# Patient Record
Sex: FEMALE | Race: WHITE | NOT HISPANIC OR LATINO | Employment: FULL TIME | URBAN - METROPOLITAN AREA
[De-identification: names, ages, dates, MRNs, and addresses within clinical notes are randomized per-mention and may not be internally consistent; named-entity substitution may affect disease eponyms.]

---

## 2017-01-16 ENCOUNTER — OFFICE VISIT (OUTPATIENT)
Dept: FAMILY MEDICINE | Facility: CLINIC | Age: 37
End: 2017-01-16
Payer: COMMERCIAL

## 2017-01-16 VITALS
HEIGHT: 69 IN | SYSTOLIC BLOOD PRESSURE: 123 MMHG | HEART RATE: 77 BPM | OXYGEN SATURATION: 100 % | TEMPERATURE: 97.6 F | DIASTOLIC BLOOD PRESSURE: 81 MMHG | BODY MASS INDEX: 38.66 KG/M2 | WEIGHT: 261 LBS

## 2017-01-16 DIAGNOSIS — Z23 NEED FOR PROPHYLACTIC VACCINATION AND INOCULATION AGAINST INFLUENZA: ICD-10-CM

## 2017-01-16 DIAGNOSIS — Z13.1 SCREENING FOR DIABETES MELLITUS: ICD-10-CM

## 2017-01-16 DIAGNOSIS — Z13.6 CARDIOVASCULAR SCREENING; LDL GOAL LESS THAN 160: ICD-10-CM

## 2017-01-16 DIAGNOSIS — F32.0 MAJOR DEPRESSIVE DISORDER, SINGLE EPISODE, MILD (H): ICD-10-CM

## 2017-01-16 DIAGNOSIS — Z00.00 ENCOUNTER FOR ROUTINE ADULT HEALTH EXAMINATION WITHOUT ABNORMAL FINDINGS: Primary | ICD-10-CM

## 2017-01-16 PROCEDURE — 99385 PREV VISIT NEW AGE 18-39: CPT | Mod: 25 | Performed by: PHYSICIAN ASSISTANT

## 2017-01-16 PROCEDURE — 90686 IIV4 VACC NO PRSV 0.5 ML IM: CPT | Performed by: PHYSICIAN ASSISTANT

## 2017-01-16 PROCEDURE — 90471 IMMUNIZATION ADMIN: CPT | Performed by: PHYSICIAN ASSISTANT

## 2017-01-16 RX ORDER — ACETAMINOPHEN 325 MG/1
325-650 TABLET ORAL
COMMUNITY
Start: 2013-12-18

## 2017-01-16 RX ORDER — CITALOPRAM HYDROBROMIDE 40 MG/1
40 TABLET ORAL DAILY
Qty: 90 TABLET | Refills: 1 | Status: SHIPPED | OUTPATIENT
Start: 2017-01-16 | End: 2017-07-26

## 2017-01-16 RX ORDER — IBUPROFEN 200 MG
600 TABLET ORAL
COMMUNITY
Start: 2013-12-18 | End: 2018-07-30

## 2017-01-16 RX ORDER — CITALOPRAM HYDROBROMIDE 40 MG/1
40 TABLET ORAL
COMMUNITY
Start: 2016-05-17 | End: 2017-01-16

## 2017-01-16 NOTE — MR AVS SNAPSHOT
After Visit Summary   1/16/2017    Shala Poole    MRN: [id number]           Patient Information     Date Of Birth          1980        Visit Information        Provider Department      1/16/2017 4:40 PM Livia Fall PA-C [facility]        Today's Diagnoses     Encounter for routine adult health examination without abnormal findings    -  1     Major depressive disorder, single episode, mild (H)         Need for prophylactic vaccination and inoculation against influenza         CARDIOVASCULAR SCREENING; LDL GOAL LESS THAN 160         Screening for diabetes mellitus           Care Instructions      Preventive Health Recommendations  Female Ages 26 - 39  Yearly exam:   See your health care provider every year in order to    Review health changes.     Discuss preventive care.      Review your medicines if you your doctor has prescribed any.    Until age 30: Get a Pap test every three years (more often if you have had an abnormal result).    After age 30: Talk to your doctor about whether you should have a Pap test every 3 years or have a Pap test with HPV screening every 5 years.   You do not need a Pap test if your uterus was removed (hysterectomy) and you have not had cancer.  You should be tested each year for STDs (sexually transmitted diseases), if you're at risk.   Talk to your provider about how often to have your cholesterol checked.  If you are at risk for diabetes, you should have a diabetes test (fasting glucose).  Shots: Get a flu shot each year. Get a tetanus shot every 10 years.   Nutrition:     Eat at least 5 servings of fruits and vegetables each day.    Eat whole-grain bread, whole-wheat pasta and brown rice instead of white grains and rice.    Talk to your provider about Calcium and Vitamin D.     Lifestyle    Exercise at least 150 minutes a week (30 minutes a day, 5 days of the week). This will help you control your weight and prevent disease.    Limit  "alcohol to one drink per day.    No smoking.     Wear sunscreen to prevent skin cancer.    See your dentist every six months for an exam and cleaning.            Follow-ups after your visit        Future tests that were ordered for you today     Open Future Orders        Priority Expected Expires Ordered    Lipid panel reflex to direct LDL Routine  2017    Glucose whole blood Routine  2017            Who to contact     If you have questions or need follow up information about today's clinic visit or your schedule please contact [facility] directly at [phone].  Normal or non-critical lab and imaging results will be communicated to you by [employer], letter or phone within 4 business days after the clinic has received the results. If you do not hear from us within 7 days, please contact the clinic through [employer] or phone. If you have a critical or abnormal lab result, we will notify you by phone as soon as possible.  Submit refill requests through [employer] or call your pharmacy and they will forward the refill request to us. Please allow 3 business days for your refill to be completed.          Additional Information About Your Visit        MyChart Information     [employer] lets you send messages to your doctor, view your test results, renew your prescriptions, schedule appointments and more. To sign up, go to [email or web address] . Click on \"Log in\" on the left side of the screen, which will take you to the Welcome page. Then click on \"Sign up Now\" on the right side of the page.     You will be asked to enter the access code listed below, as well as some personal information. Please follow the directions to create your username and password.     Your access code is: [email or web address]  Expires: 2017  4:54 PM     Your access code will  in 90 days. If you need help or a new code, please call your [facility] or [phone].        Care EveryWhere ID     This is " "your Care EveryWhere ID. This could be used by other organizations to access your [place] medical records  [id number]        Your Vitals Were     Pulse Temperature Height BMI (Body Mass Index) Pulse Oximetry       77 97.6  F (36.4  C) (Oral) 5' 8.5\" (1.74 m) 39.10 kg/m2 100%        Blood Pressure from Last 3 Encounters:   01/16/17 123/81    Weight from Last 3 Encounters:   01/16/17 261 lb (118.389 kg)              We Performed the Following     FLU VAC, SPLIT VIRUS IM > 3 YO (QUADRIVALENT) [72176]     Vaccine Administration, Initial [63395]          Today's Medication Changes          These changes are accurate as of: 1/16/17  4:54 PM.  If you have any questions, ask your nurse or doctor.               These medicines have changed or have updated prescriptions.        Dose/Directions    citalopram 40 MG tablet   Commonly known as:  celeXA   This may have changed:  when to take this   Used for:  Major depressive disorder, single episode, mild (H)   Changed by:  Livia Fall PA-C        Dose:  40 mg   Take 1 tablet (40 mg) by mouth daily   Quantity:  90 tablet   Refills:  1            Where to get your medicines      [id number] [address] [address] [facility]  [address] [facility] [address]    Hours:  24-hours Phone:  [phone]    - citalopram 40 MG tablet             Primary Care Provider    None Specified       No primary provider on file.        Thank you!     Thank you for choosing [facility]  for your [facility]. Our goal is always to provide you with excellent care. Hearing back from our patients is one way we can continue to improve our services. Please take a few minutes to complete the written survey that you may receive in the mail after your visit with us. Thank you!             Your Updated Medication List - Protect others around you: Learn how to safely use, store and throw " away your medicines at www.disposemymeds.org.          This list is accurate as of: 1/16/17  4:54 PM.  Always use your most recent med list.                   Brand Name Dispense Instructions for use    acetaminophen 325 MG tablet    TYLENOL     Take 325-650 mg by mouth       citalopram 40 MG tablet    celeXA    90 tablet    Take 1 tablet (40 mg) by mouth daily       ibuprofen 200 MG tablet    ADVIL/MOTRIN     Take 600 mg by mouth       omeprazole 20 MG CR capsule    priLOSEC     Take 20 mg by mouth

## 2017-01-16 NOTE — PROGRESS NOTES
SUBJECTIVE:     CC: Shala Poole is an 36 year old woman who presents for preventive health visit.     Healthy Habits:    Do you get at least three servings of calcium containing foods daily (dairy, green leafy vegetables, etc.)? yes    Amount of exercise or daily activities, outside of work: 3 hours per week.     Problems taking medications regularly No    Medication side effects: No    Have you had an eye exam in the past two years? yes    Do you see a dentist twice per year? yes    Do you have sleep apnea, excessive snoring or daytime drowsiness?no      She was diagnosed with Depression in 2006. Trigger was having children. She has been on the celexa for years. Denies any side effects from the medication. No past hospitalization for depression. Denies any thoughts of self harm or harming others.     She has chronic acid reflux, which is controlled well with the prilosec. She has not had an EGD for evaluation.       Today's PHQ-2 Score:   PHQ-2 ( 1999 Pfizer) 1/16/2017   Q1: Little interest or pleasure in doing things 0   Q2: Feeling down, depressed or hopeless 0   PHQ-2 Score 0       Abuse: Current or Past(Physical, Sexual or Emotional)- No  Do you feel safe in your environment - Yes    Social History   Substance Use Topics     Smoking status: Never Smoker      Smokeless tobacco: Not on file     Alcohol Use: No     The patient does not drink >3 drinks per day nor >7 drinks per week.    No results for input(s): CHOL, HDL, LDL, TRIG, CHOLHDLRATIO, NHDL in the last 45909 hours.    Reviewed orders with patient.  Reviewed health maintenance and updated orders accordingly - Yes    Mammo Decision Support:  Mammogram not appropriate for this patient based on age.    Pertinent mammograms are reviewed under the imaging tab.  History of abnormal Pap smear: Status post benign hysterectomy. Health Maintenance and Surgical History updated.  All Histories reviewed and updated in Epic.      ROS:  C: NEGATIVE for fever,  "chills, change in weight  I: NEGATIVE for worrisome rashes, moles or lesions  E: NEGATIVE for vision changes or irritation  ENT: NEGATIVE for ear, mouth and throat problems  R: NEGATIVE for significant cough or SOB  B: NEGATIVE for masses, tenderness or discharge  CV: NEGATIVE for chest pain, palpitations or peripheral edema  GI: NEGATIVE for nausea, abdominal pain, heartburn, or change in bowel habits   female: no unusual urinary symptoms, no unusual vaginal symptoms and no longer has menses since hysterectomy   M: NEGATIVE for significant arthralgias or myalgia  N: NEGATIVE for weakness, dizziness or paresthesias  P: NEGATIVE for changes in mood or affect    Problem list, Medication list, Allergies, and Medical/Social/Surgical histories reviewed in [facility] and updated as appropriate.  BP Readings from Last 3 Encounters:   01/16/17 123/81    Wt Readings from Last 3 Encounters:   01/16/17 261 lb (118.389 kg)                  There is no problem list on file for this patient.    Past Surgical History   Procedure Laterality Date     Hysterectomy, pap no longer indicated         Social History   Substance Use Topics     Smoking status: Never Smoker      Smokeless tobacco: Not on file     Alcohol Use: No     Family History   Problem Relation Age of Onset     Other - See Comments Mother      Fibromyalga.      DIABETES Father      Chronic Obstructive Pulmonary Disease Father      Asthma Father      Heart Failure Father      DIABETES Paternal Grandmother          Current Outpatient Prescriptions   Medication Sig Dispense Refill     omeprazole (PRILOSEC) 20 MG CR capsule Take 20 mg by mouth       ibuprofen (ADVIL/MOTRIN) 200 MG tablet Take 600 mg by mouth       citalopram (CELEXA) 40 MG tablet Take 40 mg by mouth       acetaminophen (TYLENOL) 325 MG tablet Take 325-650 mg by mouth       No Known Allergies  OBJECTIVE:     /81 mmHg  Pulse 77  Temp(Src) 97.6  F (36.4  C) (Oral)  Ht 5' 8.5\" (1.74 m)  Wt 261 lb (118.389 " "kg)  BMI 39.10 kg/m2  SpO2 100%  EXAM:  GENERAL: healthy, alert and no distress  EYES: Eyes grossly normal to inspection, PERRL and conjunctivae and sclerae normal  HENT: ear canals and TM's normal, nose and mouth without ulcers or lesions  NECK: no adenopathy, no asymmetry, masses, or scars and thyroid normal to palpation  RESP: lungs clear to auscultation - no rales, rhonchi or wheezes  BREAST: deferred  CV: regular rate and rhythm, normal S1 S2, no S3 or S4, no murmur, click or rub, no peripheral edema and peripheral pulses strong  ABDOMEN: soft, nontender, no hepatosplenomegaly, no masses and bowel sounds normal   (female): deferred  MS: no gross musculoskeletal defects noted, no edema  SKIN: no suspicious lesions or rashes  NEURO: Normal strength and tone, mentation intact and speech normal  PSYCH: mentation appears normal, affect normal/bright    ASSESSMENT/PLAN:         ICD-10-CM    1. Encounter for routine adult health examination without abnormal findings Z00.00    2. Major depressive disorder, single episode, mild (H) F32.0 citalopram (CELEXA) 40 MG tablet   3. Need for prophylactic vaccination and inoculation against influenza Z23 FLU VAC, SPLIT VIRUS IM > 3 YO (QUADRIVALENT) [14999]     Vaccine Administration, Initial [70925]   4. CARDIOVASCULAR SCREENING; LDL GOAL LESS THAN 160 Z13.6 Lipid panel reflex to direct LDL   5. Screening for diabetes mellitus Z13.1 Glucose whole blood       COUNSELING:   Reviewed preventive health counseling, as reflected in patient instructions       Regular exercise       Healthy diet/nutrition       Vaccinated for: Influenza         reports that she has never smoked. She does not have any smokeless tobacco history on file.    Estimated body mass index is 39.1 kg/(m^2) as calculated from the following:    Height as of this encounter: 5' 8.5\" (1.74 m).    Weight as of this encounter: 261 lb (118.389 kg).   Weight management plan: Discussed healthy diet and exercise " guidelines and patient will follow up in 12 months in clinic to re-evaluate.    Counseling Resources:  ATP IV Guidelines  Pooled Cohorts Equation Calculator  Breast Cancer Risk Calculator  FRAX Risk Assessment  ICSI Preventive Guidelines  Dietary Guidelines for Americans, 2010  USDA's MyPlate  ASA Prophylaxis  Lung CA Screening    Livia Fall PA-C  [facility]  Injectable Influenza Immunization Documentation    1.  Is the person to be vaccinated sick today?  No    2. Does the person to be vaccinated have an allergy to eggs or to a component of the vaccine?  No    3. Has the person to be vaccinated today ever had a serious reaction to influenza vaccine in the past?  No    4. Has the person to be vaccinated ever had Guillain-Pandora syndrome?  No     Form completed by Ivy Marshall MA

## 2017-01-16 NOTE — Clinical Note
Please abstract the following data from this visit with this patient into the appropriate field in Epic:  Lipids done on this date: 11/28/14 (approximately), by this group: [facility], results were Pulled from Care Everywhere.

## 2017-01-16 NOTE — Clinical Note
My Depression Action Plan  Name: Shala Poole   Date of Birth 1980  Date: 1/16/2017    My doctor: No primary care provider on file.   [address] [facility] [address] [phone]          GREEN    ZONE   Good Control    What it looks like:     Things are going generally well. You have normal up s and down s. You may even feel depressed from time to time, but bad moods usually last less than a day.   What you need to do:  1. Continue to care for yourself (see self care plan)  2. Check your depression survival kit and update it as needed  3. Follow your physician s recommendations including any medication.  4. Do not stop taking medication unless you consult with your physician first.           YELLOW         ZONE Getting Worse    What it looks like:     Depression is starting to interfere with your life.     It may be hard to get out of bed; you may be starting to isolate yourself from others.    Symptoms of depression are starting to last most all day and this has happened for several days.     You may have suicidal thoughts but they are not constant.   What you need to do:     1. Call your care team, your response to treatment will improve if you keep your care team informed of your progress. Yellow periods are signs an adjustment may need to be made.     2. Continue your self-care, even if you have to fake it!    3. Talk to someone in your support network    4. Open up your depression survival kit           RED    ZONE Medical Alert - Get Help    What it looks like:     Depression is seriously interfering with your life.     You may experience these or other symptoms: You can t get out of bed most days, can t work or engage in other necessary activities, you have trouble taking care of basic hygiene, or basic responsibilities, thoughts of suicide or death that will not go away, self-injurious behavior.     What you need to do:  1. Call your  care team and request a same-day appointment. If they are not available (weekends or after hours) call your local crisis line, emergency room or 911.      Electronically signed by: Ivy Marshall, January 16, 2017    Depression Self Care Plan / Survival Kit    Self-Care for Depression  Here s the deal. Your body and mind are really not as separate as most people think.  What you do and think affects how you feel and how you feel influences what you do and think. This means if you do things that people who feel good do, it will help you feel better.  Sometimes this is all it takes.  There is also a place for medication and therapy depending on how severe your depression is, so be sure to consult with your medical provider and/ or Behavioral Health Consultant if your symptoms are worsening or not improving.     In order to better manage my stress, I will:    Exercise  Get some form of exercise, every day. This will help reduce pain and release endorphins, the  feel good  chemicals in your brain. This is almost as good as taking antidepressants!  This is not the same as joining a gym and then never going! (they count on that by the way ) It can be as simple as just going for a walk or doing some gardening, anything that will get you moving.      Hygiene   Maintain good hygiene (Get out of bed in the morning, Make your bed, Brush your teeth, Take a shower, and Get dressed like you were going to work, even if you are unemployed).  If your clothes don't fit try to get ones that do.    Diet  I will strive to eat foods that are good for me, drink plenty of water, and avoid excessive sugar, caffeine, alcohol, and other mood-altering substances.  Some foods that are helpful in depression are: complex carbohydrates, B vitamins, flaxseed, fish or fish oil, fresh fruits and vegetables.    Psychotherapy  I agree to participate in Individual Therapy (if recommended).    Medication  If prescribed medications, I agree to take  them.  Missing doses can result in serious side effects.  I understand that drinking alcohol, or other illicit drug use, may cause potential side effects.  I will not stop my medication abruptly without first discussing it with my provider.    Staying Connected With Others  I will stay in touch with my friends, family members, and my primary care provider/team.    Use your imagination  Be creative.  We all have a creative side; it doesn t matter if it s oil painting, sand castles, or mud pies! This will also kick up the endorphins.    Witness Beauty  (AKA stop and smell the roses) Take a look outside, even in mid-winter. Notice colors, textures. Watch the squirrels and birds.     Service to others  Be of service to others.  There is always someone else in need.  By helping others we can  get out of ourselves  and remember the really important things.  This also provides opportunities for practicing all the other parts of the program.    Humor  Laugh and be silly!  Adjust your TV habits for less news and crime-drama and more comedy.    Control your stress  Try breathing deep, massage therapy, biofeedback, and meditation. Find time to relax each day.     My support system    Clinic Contact:  Phone number:    Contact 1:  Phone number:    Contact 2:  Phone number:    Jain/:  Phone number:    Therapist:  Phone number:    Local crisis center:    Phone number:    Other community support:  Phone number:

## 2017-01-16 NOTE — NURSING NOTE
"Chief Complaint   Patient presents with     Physical       Mask not indicated for this appointment.     Initial /81 mmHg  Pulse 77  Temp(Src) 97.6  F (36.4  C) (Oral)  Ht 5' 8.5\" (1.74 m)  Wt 261 lb (118.389 kg)  BMI 39.10 kg/m2  SpO2 100% Estimated body mass index is 39.1 kg/(m^2) as calculated from the following:    Height as of this encounter: 5' 8.5\" (1.74 m).    Weight as of this encounter: 261 lb (118.389 kg)..  BP completed using cuff size: jonny Marshall MA    "

## 2017-01-17 ASSESSMENT — PATIENT HEALTH QUESTIONNAIRE - PHQ9: SUM OF ALL RESPONSES TO PHQ QUESTIONS 1-9: 3

## 2017-04-24 DIAGNOSIS — Z13.1 SCREENING FOR DIABETES MELLITUS: ICD-10-CM

## 2017-04-24 DIAGNOSIS — Z13.6 CARDIOVASCULAR SCREENING; LDL GOAL LESS THAN 160: ICD-10-CM

## 2017-04-24 LAB
CHOLEST SERPL-MCNC: 152 MG/DL
GLUCOSE BLD-MCNC: 103 MG/DL (ref 70–99)
HDLC SERPL-MCNC: 41 MG/DL
LDLC SERPL CALC-MCNC: 65 MG/DL
NONHDLC SERPL-MCNC: 111 MG/DL
TRIGL SERPL-MCNC: 230 MG/DL

## 2017-04-24 PROCEDURE — 80061 LIPID PANEL: CPT | Performed by: PHYSICIAN ASSISTANT

## 2017-04-24 PROCEDURE — 82947 ASSAY GLUCOSE BLOOD QUANT: CPT | Performed by: PHYSICIAN ASSISTANT

## 2017-04-24 PROCEDURE — 36415 COLL VENOUS BLD VENIPUNCTURE: CPT | Performed by: PHYSICIAN ASSISTANT

## 2017-07-25 NOTE — PROGRESS NOTES
SUBJECTIVE:                                                    Shala Poole is a 37 year old female who presents to clinic today for the following health issues:        Depression Followup    Status since last visit: Stable     See PHQ-9 for current symptoms.  Other associated symptoms: None    Complicating factors:   Significant life event: Father passed away   Current substance abuse:  None  Anxiety or Panic symptoms:  No    PHQ-9  English  PHQ-9   Any Language      Amount of exercise or physical activity: None    Problems taking medications regularly: No    Medication side effects: none  Diet: regular (no restrictions)      She denies any side effects from her medication. States her depression is well controlled. She denies any thoughts of self harm or harming others. She denies any drug or alcohol use.     She is asking for a referral to audiologist to have her hearing aides checked. Hearing aides keep shutting off. She has tried to replace batteries with no improvement.     Problem list and histories reviewed & adjusted, as indicated.  Additional history: as documented    Patient Active Problem List   Diagnosis     Major depressive disorder, single episode, mild (H)     CARDIOVASCULAR SCREENING; LDL GOAL LESS THAN 160     Past Surgical History:   Procedure Laterality Date     HYSTERECTOMY, PAP NO LONGER INDICATED  12/2014    due to excessive bleeding - ovaries remain     ORTHOPEDIC SURGERY      cyst removal from left wrist        Social History   Substance Use Topics     Smoking status: Never Smoker     Smokeless tobacco: Not on file     Alcohol use No     Family History   Problem Relation Age of Onset     Other - See Comments Mother      Fibromyalga.      DIABETES Father      Chronic Obstructive Pulmonary Disease Father      Asthma Father      Heart Failure Father      DIABETES Paternal Grandmother          Current Outpatient Prescriptions   Medication Sig Dispense Refill     omeprazole (PRILOSEC) 20 MG  "CR capsule Take 20 mg by mouth       ibuprofen (ADVIL/MOTRIN) 200 MG tablet Take 600 mg by mouth       acetaminophen (TYLENOL) 325 MG tablet Take 325-650 mg by mouth       citalopram (CELEXA) 40 MG tablet Take 1 tablet (40 mg) by mouth daily 90 tablet 1     No Known Allergies  BP Readings from Last 3 Encounters:   07/26/17 112/70   01/16/17 123/81    Wt Readings from Last 3 Encounters:   07/26/17 269 lb (122 kg)   01/16/17 261 lb (118.4 kg)                        Reviewed and updated as needed this visit by clinical staff     Reviewed and updated as needed this visit by Provider         ROS:  Constitutional, and psychiatry systems are negative, except as otherwise noted.      OBJECTIVE:   /70  Temp 98  F (36.7  C) (Oral)  Ht 5' 8.5\" (1.74 m)  Wt 269 lb (122 kg)  SpO2 99%  BMI 40.31 kg/m2  Body mass index is 40.31 kg/(m^2).  GENERAL: healthy, alert and no distress  PSYCH: mentation appears normal, affect normal/bright        ASSESSMENT/PLAN:       ICD-10-CM    1. Major depressive disorder, single episode, mild (H) F32.0 citalopram (CELEXA) 40 MG tablet   2. Hearing aid consultation Z71.89 AUDIOLOGY ADULT REFERRAL         Patient Instructions   Recheck in 6 months at annual physical.    Return sooner if any concerns.       Livia Fall PA-C  [facility]  "

## 2017-07-26 ENCOUNTER — OFFICE VISIT (OUTPATIENT)
Dept: FAMILY MEDICINE | Facility: CLINIC | Age: 37
End: 2017-07-26
Payer: COMMERCIAL

## 2017-07-26 VITALS
SYSTOLIC BLOOD PRESSURE: 112 MMHG | HEIGHT: 69 IN | WEIGHT: 269 LBS | DIASTOLIC BLOOD PRESSURE: 70 MMHG | OXYGEN SATURATION: 99 % | TEMPERATURE: 98 F | BODY MASS INDEX: 39.84 KG/M2

## 2017-07-26 DIAGNOSIS — Z71.89 HEARING AID CONSULTATION: ICD-10-CM

## 2017-07-26 DIAGNOSIS — F32.0 MAJOR DEPRESSIVE DISORDER, SINGLE EPISODE, MILD (H): Primary | ICD-10-CM

## 2017-07-26 PROCEDURE — 99213 OFFICE O/P EST LOW 20 MIN: CPT | Performed by: PHYSICIAN ASSISTANT

## 2017-07-26 RX ORDER — CITALOPRAM HYDROBROMIDE 40 MG/1
40 TABLET ORAL DAILY
Qty: 90 TABLET | Refills: 1 | Status: SHIPPED | OUTPATIENT
Start: 2017-07-26 | End: 2017-09-21

## 2017-07-26 NOTE — MR AVS SNAPSHOT
After Visit Summary   7/26/2017    Shala Poole    MRN: [id number]           Patient Information     Date Of Birth          1980        Visit Information        Provider Department      7/26/2017 6:00 PM Livia Fall PA-C [facility]        Today's Diagnoses     Hearing aid consultation    -  1    Major depressive disorder, single episode, mild (H)          Care Instructions    Recheck in 6 months at annual physical.    Return sooner if any concerns.           Follow-ups after your visit        Additional Services     AUDIOLOGY ADULT REFERRAL       Your provider has referred you to: FMG: [facility] [phone]   [email or web address]  FMG: [place] [facility]i Howard [facility] [phone]   [email or web address]  FMG: [place] Maria Elena [facility] [phone]   [email or web address]  UM: [facility] [phone]   [email or web address]    Treatment:  Malfunctioning hearing aides   Specialty Testing:  None    Please be aware that coverage of these services is subject to the terms and limitations of your health insurance plan.  Call member services at your health plan with any benefit or coverage questions.      Please bring the following to your appointment:  >>   Any x-rays, CTs or MRIs which have been performed.  Contact the facility where they were done to arrange for  prior to your scheduled appointment.   >>   List of current medications  >>   This referral request   >>   Any documents/labs given to you for this referral                  Who to contact     If you have questions or need follow up information about today's clinic visit or your schedule please contact [facility] directly at [phone].  Normal or non-critical lab and imaging results will be  "communicated to you by Moviecom.tvhart, letter or phone within 4 business days after the clinic has received the results. If you do not hear from us within 7 days, please contact the clinic through PipelineDB or phone. If you have a critical or abnormal lab result, we will notify you by phone as soon as possible.  Submit refill requests through PipelineDB or call your pharmacy and they will forward the refill request to us. Please allow 3 business days for your refill to be completed.          Additional Information About Your Visit        PipelineDB Information     PipelineDB gives you secure access to your electronic health record. If you see a primary care provider, you can also send messages to your care team and make appointments. If you have questions, please call your primary care clinic.  If you do not have a primary care provider, please call [phone] and they will assist you.        Care EveryWhere ID     This is your Care EveryWhere ID. This could be used by other organizations to access your [place] medical records  [id number]        Your Vitals Were     Temperature Height Pulse Oximetry BMI (Body Mass Index)          98  F (36.7  C) (Oral) 5' 8.5\" (1.74 m) 99% 40.31 kg/m2         Blood Pressure from Last 3 Encounters:   07/26/17 112/70   01/16/17 123/81    Weight from Last 3 Encounters:   07/26/17 269 lb (122 kg)   01/16/17 261 lb (118.4 kg)              We Performed the Following     AUDIOLOGY ADULT REFERRAL          Where to get your medicines      These medications were sent to [facility] [address] [facility]  [address] [facility] [address]    Hours:  24-hours Phone:  [phone]     citalopram 40 MG tablet          Primary Care Provider Office Phone # Fax #    Livia Fall PA-C [phone]       [facility] [place] [address] [facility] [address]        Equal Access to Services     [facility] " GAAR : Hadii aad rj rachel Mir, wafrancoda luqadaha, qaybta kakourtney caceres, waxneha maribell haytequila coloradofredtony alegre. So [facility] [phone].    ATENCIÓN: Si habla español, tiene a chadwick disposición servicios gratuitos de asistencia lingüística. Llame al [phone].    We comply with applicable federal civil rights laws and [place] laws. We do not discriminate on the basis of race, color, national origin, age, disability sex, sexual orientation or gender identity.            Thank you!     Thank you for choosing [facility]  [facility]. Our goal is always to provide you with excellent care. Hearing back from our patients is one way we can continue to improve our services. Please take a few minutes to complete the written survey that you may receive in the mail after your visit with us. Thank you!             Your Updated Medication List - Protect others around you: Learn how to safely use, store and throw away your medicines at www.disposemymeds.org.          This list is accurate as of: 7/26/17  6:11 PM.  Always use your most recent med list.                   Brand Name Dispense Instructions for use Diagnosis    acetaminophen 325 MG tablet    TYLENOL     Take 325-650 mg by mouth        citalopram 40 MG tablet    celeXA    90 tablet    Take 1 tablet (40 mg) by mouth daily    Major depressive disorder, single episode, mild (H)       ibuprofen 200 MG tablet    ADVIL/MOTRIN     Take 600 mg by mouth        omeprazole 20 MG CR capsule    priLOSEC     Take 20 mg by mouth

## 2017-07-26 NOTE — NURSING NOTE
"Chief Complaint   Patient presents with     Depression     do phq        Initial /70  Temp 98  F (36.7  C) (Oral)  Ht 5' 8.5\" (1.74 m)  Wt 269 lb (122 kg)  SpO2 99%  BMI 40.31 kg/m2 Estimated body mass index is 40.31 kg/(m^2) as calculated from the following:    Height as of this encounter: 5' 8.5\" (1.74 m).    Weight as of this encounter: 269 lb (122 kg).  Medication Reconciliation: complete  Brandie Reyes M.A.    "

## 2017-07-27 ASSESSMENT — PATIENT HEALTH QUESTIONNAIRE - PHQ9: SUM OF ALL RESPONSES TO PHQ QUESTIONS 1-9: 1

## 2017-09-21 DIAGNOSIS — F32.0 MAJOR DEPRESSIVE DISORDER, SINGLE EPISODE, MILD (H): ICD-10-CM

## 2017-09-21 RX ORDER — CITALOPRAM HYDROBROMIDE 40 MG/1
40 TABLET ORAL DAILY
Qty: 90 TABLET | Refills: 0 | Status: SHIPPED | OUTPATIENT
Start: 2017-09-21 | End: 2017-12-27

## 2017-09-28 ENCOUNTER — OFFICE VISIT (OUTPATIENT)
Dept: AUDIOLOGY | Facility: CLINIC | Age: 37
End: 2017-09-28
Payer: COMMERCIAL

## 2017-09-28 DIAGNOSIS — H90.3 SENSORINEURAL HEARING LOSS, BILATERAL: Primary | ICD-10-CM

## 2017-09-28 PROCEDURE — V5299 HEARING SERVICE: HCPCS | Performed by: AUDIOLOGIST

## 2017-09-28 PROCEDURE — 99207 ZZC NO CHARGE LOS: CPT | Performed by: AUDIOLOGIST

## 2017-09-28 NOTE — PROGRESS NOTES
HEARING AID CHECK    Patient Name:  Shala Poole    Patient Age:   37 year old    :  1980    Background:   Sahla is a transfer from [facility], as she has switched insurances and [place] is now in network. Patient was fit with [facility] [employer] 5 RITEs at [facility] in 2016. She reports that her right ear hearing aid is intermittent, the  is too short, and the  is falling out of the earmold. 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     Procedures:   Today we send the right ear hearing aid to [employer] for a repair under warranty, a lengthening of the , and for better  retention in the earmold.     Plan:   Once the hearing aid is back from repair patient will be contacted for  at the 2nd floor . At that time she will leave her left ear hearing aid to be sent to the  to lengthen the  and to have it checked over as well. I have recommended she make an appointment for a hearing test as we do not have one on file for her.      NO CHARGE VISIT    [place] CCC-A  Licensed Audiologist  2017

## 2017-09-28 NOTE — MR AVS SNAPSHOT
After Visit Summary   9/28/2017    Shala Poole    MRN: [id number]           Patient Information     Date Of Birth          1980        Visit Information        Provider Department      9/28/2017 9:00 AM Rogers [place] AuD [facility]        Today's Diagnoses     Sensorineural hearing loss, bilateral    -  1       Follow-ups after your visit        Your next 10 appointments already scheduled     Nov 27, 2017  8:00 AM CST   Hearing Aid Consult with Henry Grimes   [facility] ([facility]    [address] [place] [address] [phone]              Who to contact     If you have questions or need follow up information about today's clinic visit or your schedule please contact [facility] directly at [phone].  Normal or non-critical lab and imaging results will be communicated to you by MyChart, letter or phone within 4 business days after the clinic has received the results. If you do not hear from us within 7 days, please contact the clinic through MyChart or phone. If you have a critical or abnormal lab result, we will notify you by phone as soon as possible.  Submit refill requests through Marketcetera or call your pharmacy and they will forward the refill request to us. Please allow 3 business days for your refill to be completed.          Additional Information About Your Visit        MyChart Information     Marketcetera gives you secure access to your electronic health record. If you see a primary care provider, you can also send messages to your care team and make appointments. If you have questions, please call your primary care clinic.  If you do not have a primary care provider, please call [phone] and they will assist you.        Care EveryWhere ID     This is your Care EveryWhere ID. This could be used by other organizations to access your [place] medical records  [id number]         Blood  Pressure from Last 3 Encounters:   07/26/17 112/70   01/16/17 123/81    Weight from Last 3 Encounters:   07/26/17 269 lb (122 kg)   01/16/17 261 lb (118.4 kg)              We Performed the Following     HEARING AID CHECK/NO CHARGE        Primary Care Provider Office Phone # Fax #    Livia Fall PA-C [phone] [address] [facility] [address]        Equal Access to Services     ADAM GOLDMAN : Hadii aad ku hadasho Soomaali, waaxda luqadaha, qaybta kaalmada adeegyada, waxay idiin hayaan adeeg kharash la'tequila . So [facility] [phone].    ATENCIÓN: Si habla español, tiene a chadwick disposición servicios gratuitos de asistencia lingüística. [facility] [phone].    We comply with applicable federal civil rights laws and [place] laws. We do not discriminate on the basis of race, color, national origin, age, disability sex, sexual orientation or gender identity.            Thank you!     Thank you for choosing [facility]  for your care. Our goal is always to provide you with excellent care. Hearing back from our patients is one way we can continue to improve our services. Please take a few minutes to complete the written survey that you may receive in the mail after your visit with us. Thank you!             Your Updated Medication List - Protect others around you: Learn how to safely use, store and throw away your medicines at www.disposemymeds.org.          This list is accurate as of: 9/28/17 10:12 AM.  Always use your most recent med list.                   Brand Name Dispense Instructions for use Diagnosis    acetaminophen 325 MG tablet    TYLENOL     Take 325-650 mg by mouth        citalopram 40 MG tablet    celeXA    90 tablet    Take 1 tablet (40 mg) by mouth daily    Major depressive disorder, single episode, mild (H)       ibuprofen 200 MG tablet    ADVIL/MOTRIN     Take 600 mg by mouth        omeprazole 20 MG CR capsule    priLOSEC     Take 20 mg by mouth

## 2017-10-17 ENCOUNTER — TELEPHONE (OUTPATIENT)
Dept: AUDIOLOGY | Facility: CLINIC | Age: 37
End: 2017-10-17

## 2017-10-17 NOTE — TELEPHONE ENCOUNTER
I informed the patient that her hearing aid was back from repair and ready to be picked up at the 2nd floor .     -[place] CCC-A

## 2017-12-27 DIAGNOSIS — F32.0 MAJOR DEPRESSIVE DISORDER, SINGLE EPISODE, MILD (H): ICD-10-CM

## 2017-12-29 RX ORDER — CITALOPRAM HYDROBROMIDE 40 MG/1
TABLET ORAL
Qty: 90 TABLET | Refills: 0 | Status: SHIPPED | OUTPATIENT
Start: 2017-12-29 | End: 2018-02-05

## 2017-12-29 NOTE — TELEPHONE ENCOUNTER
Rx refilled per Piedad RN refill protocol.    Pending appointment with PCP in Jan 2018.    Jessica Zaidi RN

## 2018-02-05 ENCOUNTER — OFFICE VISIT (OUTPATIENT)
Dept: FAMILY MEDICINE | Facility: CLINIC | Age: 38
End: 2018-02-05
Payer: COMMERCIAL

## 2018-02-05 VITALS
HEART RATE: 90 BPM | SYSTOLIC BLOOD PRESSURE: 122 MMHG | OXYGEN SATURATION: 98 % | TEMPERATURE: 99.1 F | BODY MASS INDEX: 38.8 KG/M2 | HEIGHT: 69 IN | DIASTOLIC BLOOD PRESSURE: 83 MMHG | WEIGHT: 262 LBS

## 2018-02-05 DIAGNOSIS — F32.0 MAJOR DEPRESSIVE DISORDER, SINGLE EPISODE, MILD (H): Primary | ICD-10-CM

## 2018-02-05 DIAGNOSIS — F41.1 GAD (GENERALIZED ANXIETY DISORDER): ICD-10-CM

## 2018-02-05 PROCEDURE — 99213 OFFICE O/P EST LOW 20 MIN: CPT | Performed by: PHYSICIAN ASSISTANT

## 2018-02-05 RX ORDER — VENLAFAXINE HYDROCHLORIDE 37.5 MG/1
CAPSULE, EXTENDED RELEASE ORAL
Qty: 60 CAPSULE | Refills: 1 | Status: SHIPPED | OUTPATIENT
Start: 2018-02-05 | End: 2018-03-19 | Stop reason: DRUGHIGH

## 2018-02-05 ASSESSMENT — ANXIETY QUESTIONNAIRES
GAD7 TOTAL SCORE: 7
IF YOU CHECKED OFF ANY PROBLEMS ON THIS QUESTIONNAIRE, HOW DIFFICULT HAVE THESE PROBLEMS MADE IT FOR YOU TO DO YOUR WORK, TAKE CARE OF THINGS AT HOME, OR GET ALONG WITH OTHER PEOPLE: SOMEWHAT DIFFICULT
3. WORRYING TOO MUCH ABOUT DIFFERENT THINGS: SEVERAL DAYS
7. FEELING AFRAID AS IF SOMETHING AWFUL MIGHT HAPPEN: NOT AT ALL
2. NOT BEING ABLE TO STOP OR CONTROL WORRYING: MORE THAN HALF THE DAYS
5. BEING SO RESTLESS THAT IT IS HARD TO SIT STILL: NOT AT ALL
1. FEELING NERVOUS, ANXIOUS, OR ON EDGE: SEVERAL DAYS
6. BECOMING EASILY ANNOYED OR IRRITABLE: SEVERAL DAYS

## 2018-02-05 ASSESSMENT — PATIENT HEALTH QUESTIONNAIRE - PHQ9: 5. POOR APPETITE OR OVEREATING: MORE THAN HALF THE DAYS

## 2018-02-05 NOTE — PROGRESS NOTES
SUBJECTIVE:   Shala Poole is a 38 year old female who presents to clinic today for the following health issues:    She has been a patient of Livia Little and has been taking citalopram 40 mg daily for her anxiety / depression. She does not feel that it has been very helpful for the anxiety. She has been on other SSRI medications in the past.       History of Present Illness     Depression & Anxiety Follow-up:     Depression/Anxiety:  Depression & Anxiety    Status since last visit::  Stable    Other associated symptoms of depression and anxiety::  None    Significant life event::  No    Current substance use::  None       Today's PHQ-9         PHQ-9 Total Score:         PHQ-9 Q9 Suicidal ideation:       Thoughts of suicide or self harm:      Self-harm Plan:        Self-harm Action:          Safety concerns for self or others:              Problem list and histories reviewed & adjusted, as indicated.  Additional history: as documented      Patient Active Problem List   Diagnosis     Major depressive disorder, single episode, mild (H)     CARDIOVASCULAR SCREENING; LDL GOAL LESS THAN 160     Past Surgical History:   Procedure Laterality Date     HYSTERECTOMY, PAP NO LONGER INDICATED  12/2014    due to excessive bleeding - ovaries remain     ORTHOPEDIC SURGERY      cyst removal from left wrist        Social History   Substance Use Topics     Smoking status: Never Smoker     Smokeless tobacco: Never Used     Alcohol use Yes      Comment: rare     Family History   Problem Relation Age of Onset     Other - See Comments Mother      Fibromyalga.      Thyroid Disease Mother      DIABETES Father      Chronic Obstructive Pulmonary Disease Father      Asthma Father      Heart Failure Father      DIABETES Paternal Grandmother      Colon Cancer Other          No Known Allergies    ROS:  Constitutional, HEENT, cardiovascular, pulmonary, gi and gu systems are negative, except as otherwise noted.    OBJECTIVE:     /83  " Pulse 90  Temp 99.1  F (37.3  C) (Oral)  Ht 5' 8.5\" (1.74 m)  Wt 262 lb (118.8 kg)  SpO2 98%  BMI 39.26 kg/m2  Body mass index is 39.26 kg/(m^2).  GENERAL: healthy, alert and no distress  PSYCH: mentation appears normal, affect normal/bright, judgement and insight intact and appearance well groomed    Diagnostic Test Results:  none     ASSESSMENT/PLAN:       1. Major depressive disorder, single episode, mild (H)  2. DAVID (generalized anxiety disorder)  Wean off of the citalopram over the next few weeks   Start the effexor and titrate dose.   Side effects and how to take the medication discussed.  She will follow up in 2 -3 months  - venlafaxine (EFFEXOR-XR) 37.5 MG 24 hr capsule; Take 1 capsule daily for 7 days, then take 2 capsules daily.  Dispense: 60 capsule; Refill: 1     20 minutes spent with Shala today. Over 50% of time taken for discussion of above, counseling and coordination of care.   Kristen M. Kehr, PA-C  [facility]  "

## 2018-02-05 NOTE — NURSING NOTE
"Chief Complaint   Patient presents with     Depression     Anxiety       Initial /83  Pulse 90  Temp 99.1  F (37.3  C) (Oral)  Ht 5' 8.5\" (1.74 m)  Wt 262 lb (118.8 kg)  SpO2 98%  BMI 39.26 kg/m2 Estimated body mass index is 39.26 kg/(m^2) as calculated from the following:    Height as of this encounter: 5' 8.5\" (1.74 m).    Weight as of this encounter: 262 lb (118.8 kg).  Medication Reconciliation: complete    KENNETH Chapman MA    "

## 2018-02-05 NOTE — MR AVS SNAPSHOT
After Visit Summary   2/5/2018    Shala Poole    MRN: [id number]           Patient Information     Date Of Birth          1980        Visit Information        Provider Department      2/5/2018 5:20 PM Kehr, Kristen M, PA-C [facility]        Today's Diagnoses     Major depressive disorder, single episode, mild (H)    -  1    DAVID (generalized anxiety disorder)          Care Instructions    Appointment 6 weeks           Follow-ups after your visit        Your next 10 appointments already scheduled     Apr 27, 2018  8:00 AM CDT   [facility] Hearing Test with Henry Grimes   [facility]    [address] [place] [address] [phone]              Who to contact     If you have questions or need follow up information about today's clinic visit or your schedule please contact [facility] directly at [phone].  Normal or non-critical lab and imaging results will be communicated to you by Lineahart, letter or phone within 4 business days after the clinic has received the results. If you do not hear from us within 7 days, please contact the clinic through Getaroundt or phone. If you have a critical or abnormal lab result, we will notify you by phone as soon as possible.  Submit refill requests through Wilocity or call your pharmacy and they will forward the refill request to us. Please allow 3 business days for your refill to be completed.          Additional Information About Your Visit        Lineahart Information     [employer] gives you secure access to your electronic health record. If you see a primary care provider, you can also send messages to your care team and make appointments. If you have questions, please call your primary care clinic.  If you do not have a primary care provider, please call [phone] and they will assist you.        Care EveryWhere ID     This is your Care EveryWhere ID.  "This could be used by other organizations to access your [place] medical records  [id number]        Your Vitals Were     Pulse Temperature Height Pulse Oximetry BMI (Body Mass Index)       90 99.1  F (37.3  C) (Oral) 5' 8.5\" (1.74 m) 98% 39.26 kg/m2        Blood Pressure from Last 3 Encounters:   02/05/18 122/83   07/26/17 112/70   01/16/17 123/81    Weight from Last 3 Encounters:   02/05/18 262 lb (118.8 kg)   07/26/17 269 lb (122 kg)   01/16/17 261 lb (118.4 kg)              Today, you had the following     No orders found for display         Today's Medication Changes          These changes are accurate as of 2/5/18  5:53 PM.  If you have any questions, ask your nurse or doctor.               Start taking these medicines.        Dose/Directions    venlafaxine 37.5 MG 24 hr capsule   Commonly known as:  EFFEXOR-XR   Used for:  Major depressive disorder, single episode, mild (H), DAVID (generalized anxiety disorder)   Started by:  Kehr, Kristen M, PA-C        Take 1 capsule daily for 7 days, then take 2 capsules daily.   Quantity:  60 capsule   Refills:  1         Stop taking these medicines if you haven't already. Please contact your care team if you have questions.     citalopram 40 MG tablet   Commonly known as:  celeXA   Stopped by:  Kehr, Kristen M, PA-C                Where to get your medicines      [facility] [address] [address] [facility]  [address] [facility] [address]    Hours:  24-hours Phone:  [phone]     venlafaxine 37.5 MG 24 hr capsule                Primary Care Provider Office Phone # Fax #    Livia Fall PA-C [phone] [address] [facility] [address]        Equal Access to Services     ADAM GOLDMAN AH: Josesito Mir, maria eugenia benton, jb caceres, waxay maribell alegre. So [facility] " [phone].    ATENCIÓN: Si ritika mishra, tiene a chadwick disposición servicios gratuitos de asistencia lingüística. Nnamdi javed [phone].    We comply with applicable federal civil rights laws and [place] laws. We do not discriminate on the basis of race, color, national origin, age, disability, sex, sexual orientation, or gender identity.            Thank you!     [facility]  [facility] your [facility]. Our goal is always to provide you with excellent care. Hearing back from our patients is one way we can continue to improve our services. Please take a few minutes to complete the written survey that you may receive in the mail after your visit with us. Thank you!             Your Updated Medication List - Protect others around you: Learn how to safely use, store and throw away your medicines at www.disposemymeds.org.          This list is accurate as of 2/5/18  5:53 PM.  Always use your most recent med list.                   Brand Name Dispense Instructions for use Diagnosis    acetaminophen 325 MG tablet    TYLENOL     Take 325-650 mg by mouth        ibuprofen 200 MG tablet    ADVIL/MOTRIN     Take 600 mg by mouth        venlafaxine 37.5 MG 24 hr capsule    EFFEXOR-XR    60 capsule    Take 1 capsule daily for 7 days, then take 2 capsules daily.    Major depressive disorder, single episode, mild (H), DAVID (generalized anxiety disorder)

## 2018-02-06 ASSESSMENT — ANXIETY QUESTIONNAIRES: GAD7 TOTAL SCORE: 7

## 2018-02-06 ASSESSMENT — PATIENT HEALTH QUESTIONNAIRE - PHQ9: SUM OF ALL RESPONSES TO PHQ QUESTIONS 1-9: 3

## 2018-03-19 ENCOUNTER — OFFICE VISIT (OUTPATIENT)
Dept: FAMILY MEDICINE | Facility: CLINIC | Age: 38
End: 2018-03-19
Payer: COMMERCIAL

## 2018-03-19 VITALS
OXYGEN SATURATION: 99 % | HEART RATE: 107 BPM | TEMPERATURE: 99.3 F | SYSTOLIC BLOOD PRESSURE: 128 MMHG | BODY MASS INDEX: 39.41 KG/M2 | WEIGHT: 263 LBS | RESPIRATION RATE: 16 BRPM | DIASTOLIC BLOOD PRESSURE: 85 MMHG

## 2018-03-19 DIAGNOSIS — F32.0 MAJOR DEPRESSIVE DISORDER, SINGLE EPISODE, MILD (H): Primary | ICD-10-CM

## 2018-03-19 PROCEDURE — 99213 OFFICE O/P EST LOW 20 MIN: CPT | Performed by: PHYSICIAN ASSISTANT

## 2018-03-19 RX ORDER — VENLAFAXINE HYDROCHLORIDE 75 MG/1
75 CAPSULE, EXTENDED RELEASE ORAL DAILY
Qty: 90 CAPSULE | Refills: 1 | Status: SHIPPED | OUTPATIENT
Start: 2018-03-19 | End: 2018-04-04

## 2018-03-19 ASSESSMENT — ANXIETY QUESTIONNAIRES
5. BEING SO RESTLESS THAT IT IS HARD TO SIT STILL: NOT AT ALL
1. FEELING NERVOUS, ANXIOUS, OR ON EDGE: NOT AT ALL
3. WORRYING TOO MUCH ABOUT DIFFERENT THINGS: NOT AT ALL
GAD7 TOTAL SCORE: 0
7. FEELING AFRAID AS IF SOMETHING AWFUL MIGHT HAPPEN: NOT AT ALL
6. BECOMING EASILY ANNOYED OR IRRITABLE: NOT AT ALL
2. NOT BEING ABLE TO STOP OR CONTROL WORRYING: NOT AT ALL

## 2018-03-19 ASSESSMENT — PATIENT HEALTH QUESTIONNAIRE - PHQ9: 5. POOR APPETITE OR OVEREATING: NOT AT ALL

## 2018-03-19 ASSESSMENT — PAIN SCALES - GENERAL: PAINLEVEL: NO PAIN (0)

## 2018-03-19 NOTE — MR AVS SNAPSHOT
After Visit Summary   3/19/2018    Shala Poole    MRN: [id number]           Patient Information     Date Of Birth          1980        Visit Information        Provider Department      3/19/2018 6:00 PM Kehr, Kristen M, PA-C [facility]        Today's Diagnoses     Major depressive disorder, single episode, mild (H)    -  1       Follow-ups after your visit        Your next 10 appointments already scheduled     Apr 27, 2018  8:00 AM CDT   [facility] Hearing Test with Henry Grimes   [facility]    [address] [place] [address] [phone]              Who to contact     If you have questions or need follow up information about today's clinic visit or your schedule please contact [facility] directly at [phone].  Normal or non-critical lab and imaging results will be communicated to you by [employer], letter or phone within 4 business days after the clinic has received the results. If you do not hear from us within 7 days, please contact the clinic through [employer] or phone. If you have a critical or abnormal lab result, we will notify you by phone as soon as possible.  Submit refill requests through [employer] or call your pharmacy and they will forward the refill request to us. Please allow 3 business days for your refill to be completed.          Additional Information About Your Visit        MyChart Information     [employer] gives you secure access to your electronic health record. If you see a primary care provider, you can also send messages to your care team and make appointments. If you have questions, please call your primary care clinic.  If you do not have a primary care provider, please call [phone] and they will assist you.        Care EveryWhere ID     This is your Care EveryWhere ID. This could be used by other organizations to access your [facility]  records  [id number]        Your Vitals Were     Pulse Temperature Respirations Pulse Oximetry BMI (Body Mass Index)       107 99.3  F (37.4  C) (Oral) 16 99% 39.41 kg/m2        Blood Pressure from Last 3 Encounters:   03/19/18 128/85   02/05/18 122/83   07/26/17 112/70    Weight from Last 3 Encounters:   03/19/18 263 lb (119.3 kg)   02/05/18 262 lb (118.8 kg)   07/26/17 269 lb (122 kg)              Today, you had the following     No orders found for display         Today's Medication Changes          These changes are accurate as of 3/19/18 11:59 PM.  If you have any questions, ask your nurse or doctor.               These medicines have changed or have updated prescriptions.        Dose/Directions    venlafaxine 75 MG 24 hr capsule   Commonly known as:  EFFEXOR-XR   This may have changed:    - medication strength  - how much to take  - how to take this  - when to take this  - additional instructions   Used for:  Major depressive disorder, single episode, mild (H)   Changed by:  Kehr, Kristen M, PA-C        Dose:  75 mg   Take 1 capsule (75 mg) by mouth daily   Quantity:  90 capsule   Refills:  1            Where to get your medicines      [facility] - [address] [facility]  [address] [facility] [address]    Hours:  24-hours Phone:  [phone]     venlafaxine 75 MG 24 hr capsule                Primary Care Provider Office Phone # Fax #    Livia Fall PA-C [phone] [address] [facility] [address]        Equal Access to Services     OLEG GOLDMAN AH: Hadii juliocesar Mir, waaxda luqadaha, qaybta kaalmada adeegyada, bakari alegre. So [facility] [phone].    ATENCIÓN: Si habla español, tiene a chadwick disposición servicios gratuitos de asistencia lingüística. Llame al [phone].    We comply with applicable federal civil rights  laws and [place] laws. We do not discriminate on the basis of race, color, national origin, age, disability, sex, sexual orientation, or gender identity.            Thank you!     [facility] you [facility]  [facility]. Our goal is always to provide you with excellent care. Hearing back from our patients is one way we can continue to improve our services. Please take a few minutes to complete the written survey that you may receive in the mail after your visit with us. Thank you!             Your Updated Medication List - Protect others around you: Learn how to safely use, store and throw away your medicines at www.disposemymeds.org.          This list is accurate as of 3/19/18 11:59 PM.  Always use your most recent med list.                   Brand Name Dispense Instructions for use Diagnosis    acetaminophen 325 MG tablet    TYLENOL     Take 325-650 mg by mouth        ibuprofen 200 MG tablet    ADVIL/MOTRIN     Take 600 mg by mouth        venlafaxine 75 MG 24 hr capsule    EFFEXOR-XR    90 capsule    Take 1 capsule (75 mg) by mouth daily    Major depressive disorder, single episode, mild (H)

## 2018-03-19 NOTE — PROGRESS NOTES
SUBJECTIVE:   Shala Poole is a 38 year old female who presents to clinic today for the following health issues:      History of Present Illness     Depression & Anxiety Follow-up:     Depression/Anxiety:  Depression & Anxiety    Status since last visit::  Improved    Other associated symptoms of depression and anxiety::  None    Significant life event::  No    Current substance use::  None       Today's PHQ-9         PHQ-9 Total Score:         PHQ-9 Q9 Suicidal ideation:       Thoughts of suicide or self harm:      Self-harm Plan:        Self-harm Action:          Safety concerns for self or others:            Problem list and histories reviewed & adjusted, as indicated.  Additional history: as documented      Patient Active Problem List   Diagnosis     Major depressive disorder, single episode, mild (H)     CARDIOVASCULAR SCREENING; LDL GOAL LESS THAN 160     Past Surgical History:   Procedure Laterality Date     HYSTERECTOMY, PAP NO LONGER INDICATED  12/2014    due to excessive bleeding - ovaries remain     ORTHOPEDIC SURGERY      cyst removal from left wrist        Social History   Substance Use Topics     Smoking status: Never Smoker     Smokeless tobacco: Never Used     Alcohol use Yes      Comment: Some weekends a couple drinks socially     Family History   Problem Relation Age of Onset     Other - See Comments Mother      Fibromyalga.      Thyroid Disease Mother      Depression Mother      DIABETES Father      Chronic Obstructive Pulmonary Disease Father      Asthma Father      Heart Failure Father      Obesity Father      DIABETES Paternal Grandmother      Colon Cancer Other          Current Outpatient Prescriptions   Medication Sig Dispense Refill     venlafaxine (EFFEXOR-XR) 75 MG 24 hr capsule Take 1 capsule (75 mg) by mouth daily 90 capsule 1     ibuprofen (ADVIL/MOTRIN) 200 MG tablet Take 600 mg by mouth       acetaminophen (TYLENOL) 325 MG tablet Take 325-650 mg by mouth       No Known  Allergies    ROS:  Constitutional, HEENT, cardiovascular, pulmonary, gi and gu systems are negative, except as otherwise noted.    OBJECTIVE:     /85  Pulse 107  Temp 99.3  F (37.4  C) (Oral)  Resp 16  Wt 263 lb (119.3 kg)  SpO2 99%  BMI 39.41 kg/m2  Body mass index is 39.41 kg/(m^2).  GENERAL: healthy, alert and no distress  PSYCH: mentation appears normal, affect normal/bright, judgement and insight intact and appearance well groomed    Diagnostic Test Results:  none     ASSESSMENT/PLAN:     1. Major depressive disorder, single episode, mild (H)  Stable, refills given of the 75 mg dose.   Plan follow up in 6 months, sooner if concerns.   She may contact via [employer] in the meantime also.   - venlafaxine (EFFEXOR-XR) 75 MG 24 hr capsule; Take 1 capsule (75 mg) by mouth daily  Dispense: 90 capsule; Refill: 1        Kristen M. Kehr, PA-C  [facility]

## 2018-03-19 NOTE — NURSING NOTE
"Chief Complaint   Patient presents with     Depression     Anxiety       Initial /85  Pulse 107  Temp 99.3  F (37.4  C) (Oral)  Resp 16  Wt 263 lb (119.3 kg)  SpO2 99%  BMI 39.41 kg/m2 Estimated body mass index is 39.41 kg/(m^2) as calculated from the following:    Height as of 2/5/18: 5' 8.5\" (1.74 m).    Weight as of this encounter: 263 lb (119.3 kg).  Medication Reconciliation: complete    KENNETH Chapman MA    "

## 2018-03-19 NOTE — LETTER
My Depression Action Plan  Name: Shala Poole   Date of Birth 1980  Date: 3/19/2018    My doctor: Livia Fall   My clinic: [facility]  [id number] [place] [address] [phone]          GREEN    ZONE   Good Control    What it looks like:     Things are going generally well. You have normal up s and down s. You may even feel depressed from time to time, but bad moods usually last less than a day.   What you need to do:  1. Continue to care for yourself (see self care plan)  2. Check your depression survival kit and update it as needed  3. Follow your physician s recommendations including any medication.  4. Do not stop taking medication unless you consult with your physician first.           YELLOW         ZONE Getting Worse    What it looks like:     Depression is starting to interfere with your life.     It may be hard to get out of bed; you may be starting to isolate yourself from others.    Symptoms of depression are starting to last most all day and this has happened for several days.     You may have suicidal thoughts but they are not constant.   What you need to do:     1. Call your care team, your response to treatment will improve if you keep your care team informed of your progress. Yellow periods are signs an adjustment may need to be made.     2. Continue your self-care, even if you have to fake it!    3. Talk to someone in your support network    4. Open up your depression survival kit           RED    ZONE Medical Alert - Get Help    What it looks like:     Depression is seriously interfering with your life.     You may experience these or other symptoms: You can t get out of bed most days, can t work or engage in other necessary activities, you have trouble taking care of basic hygiene, or basic responsibilities, thoughts of suicide or death that will not go away, self-injurious behavior.     What you need to do:  1. Call your care team and  request a same-day appointment. If they are not available (weekends or after hours) call your local crisis line, emergency room or 911.            Depression Self Care Plan / Survival Kit    Self-Care for Depression  Here s the deal. Your body and mind are really not as separate as most people think.  What you do and think affects how you feel and how you feel influences what you do and think. This means if you do things that people who feel good do, it will help you feel better.  Sometimes this is all it takes.  There is also a place for medication and therapy depending on how severe your depression is, so be sure to consult with your medical provider and/ or Behavioral Health Consultant if your symptoms are worsening or not improving.     In order to better manage my stress, I will:    Exercise  Get some form of exercise, every day. This will help reduce pain and release endorphins, the  feel good  chemicals in your brain. This is almost as good as taking antidepressants!  This is not the same as joining a gym and then never going! (they count on that by the way ) It can be as simple as just going for a walk or doing some gardening, anything that will get you moving.      Hygiene   Maintain good hygiene (Get out of bed in the morning, Make your bed, Brush your teeth, Take a shower, and Get dressed like you were going to work, even if you are unemployed).  If your clothes don't fit try to get ones that do.    Diet  I will strive to eat foods that are good for me, drink plenty of water, and avoid excessive sugar, caffeine, alcohol, and other mood-altering substances.  Some foods that are helpful in depression are: complex carbohydrates, B vitamins, flaxseed, fish or fish oil, fresh fruits and vegetables.    Psychotherapy  I agree to participate in Individual Therapy (if recommended).    Medication  If prescribed medications, I agree to take them.  Missing doses can result in serious side effects.  I understand that  drinking alcohol, or other illicit drug use, may cause potential side effects.  I will not stop my medication abruptly without first discussing it with my provider.    Staying Connected With Others  I will stay in touch with my friends, family members, and my primary care provider/team.    Use your imagination  Be creative.  We all have a creative side; it doesn t matter if it s oil painting, sand castles, or mud pies! This will also kick up the endorphins.    Witness Beauty  (AKA stop and smell the roses) Take a look outside, even in mid-winter. Notice colors, textures. Watch the squirrels and birds.     Service to others  Be of service to others.  There is always someone else in need.  By helping others we can  get out of ourselves  and remember the really important things.  This also provides opportunities for practicing all the other parts of the program.    Humor  Laugh and be silly!  Adjust your TV habits for less news and crime-drama and more comedy.    Control your stress  Try breathing deep, massage therapy, biofeedback, and meditation. Find time to relax each day.     My support system    Clinic Contact:  Phone number:    Contact 1:  Phone number:    Contact 2:  Phone number:    Jewish/:  Phone number:    Therapist:  Phone number:    Local crisis center:    Phone number:    Other community support:  Phone number:

## 2018-03-20 ASSESSMENT — PATIENT HEALTH QUESTIONNAIRE - PHQ9: SUM OF ALL RESPONSES TO PHQ QUESTIONS 1-9: 0

## 2018-03-20 ASSESSMENT — ANXIETY QUESTIONNAIRES: GAD7 TOTAL SCORE: 0

## 2018-04-27 ENCOUNTER — OFFICE VISIT (OUTPATIENT)
Dept: AUDIOLOGY | Facility: CLINIC | Age: 38
End: 2018-04-27
Payer: COMMERCIAL

## 2018-04-27 DIAGNOSIS — H90.3 SENSORINEURAL HEARING LOSS, BILATERAL: Primary | ICD-10-CM

## 2018-04-27 DIAGNOSIS — H93.11 RIGHT-SIDED TINNITUS: ICD-10-CM

## 2018-04-27 PROCEDURE — 92557 COMPREHENSIVE HEARING TEST: CPT | Performed by: AUDIOLOGIST

## 2018-04-27 PROCEDURE — 92593 HC HEARING AID CHECK, BINAURAL: CPT | Performed by: AUDIOLOGIST

## 2018-04-27 PROCEDURE — 99207 ZZC NO CHARGE LOS: CPT | Performed by: AUDIOLOGIST

## 2018-04-27 PROCEDURE — 92567 TYMPANOMETRY: CPT | Performed by: AUDIOLOGIST

## 2018-04-27 NOTE — PROGRESS NOTES
AUDIOLOGY REPORT    SUBJECTIVE:  Shala Poole is a 38 year old female who was seen in the Audiology Clinic [facility] on 4/27/18 for audiologic evaluation, referred by Livia Fall PA-C.  The patient reports a longstanding bilateral hearing loss for which she has worn hearing aids bilaterally. Patient is currently wearing ReSound Linx 2 5 RITEs (). Patient reports tinnitus in her right ear. Patient reports a family history of hearing loss. The patient denies  bilateral otalgia, bilateral drainage, bilateral aural fullness, history of noise exposure, and dizziness. The patient notes difficulty with communication in a variety of listening situations.     OBJECTIVE:    Otoscopic exam indicates ears are clear of cerumen bilaterally     Pure Tone Thresholds assessed using standard techniques  audiometry with good  reliability from 250-8000 Hz bilaterally using insert earphones     RIGHT:  moderate-severe sensorineural hearing loss    LEFT:    moderate-severe sensorineural hearing loss    Tympanogram:    RIGHT: normal eardrum mobility    LEFT:   normal eardrum mobility    Speech Reception Threshold:    RIGHT: 60 dB HL    LEFT:   55 dB HL    Word Recognition Score:     RIGHT: 64% at 90 dB HL using NU-6 recorded word list.    LEFT:   88% at 90 dB HL using NU-6 recorded word list.      ASSESSMENT:   Bilateral sensorineural hearing loss with right ear tinnitus.     Today s results were discussed with the patient in detail. Today we reprogrammed her current hearing aids with this most recent audiogram. The last programming of the hearing aids was in January of 2016. Biologic listening check finds the hearing aids sounding crisp and clear. Patient reports good sound quality once the hearing aids were returned to the patient.     PLAN:  Patient was counseled regarding hearing loss and impact on communication. It is recommended that the patient follow up as needed for hearing aid concerns.  Please call this  clinic with questions regarding these results or recommendations.      [place] CCC-A  Licensed Audiologist #[id number]  4/27/2018    CC: Livia Fall PA-C

## 2018-04-27 NOTE — MR AVS SNAPSHOT
After Visit Summary   4/27/2018    Shala Poole    MRN: [id number]           Patient Information     Date Of Birth          1980        Visit Information        Provider Department      4/27/2018 8:00 AM [place] Au[facility]        Today's Diagnoses     Sensorineural hearing loss, bilateral    -  1    Right-sided tinnitus           Follow-ups after your visit        Who to contact     If you have questions or need follow up information about today's clinic visit or your schedule please contact [facility] directly at [phone].  Normal or non-critical lab and imaging results will be communicated to you by [employer], letter or phone within 4 business days after the clinic has received the results. If you do not hear from us within 7 days, please contact the clinic through [employer] or phone. If you have a critical or abnormal lab result, we will notify you by phone as soon as possible.  Submit refill requests through [employer] or call your pharmacy and they will forward the refill request to us. Please allow 3 business days for your refill to be completed.          Additional Information About Your Visit        MyChart Information     [employer] gives you secure access to your electronic health record. If you see a primary care provider, you can also send messages to your care team and make appointments. If you have questions, please call your primary care clinic.  If you do not have a primary care provider, please call [phone] and they will assist you.        Care EveryWhere ID     This is your Care EveryWhere ID. This could be used by other organizations to access your [place] medical records  [id number]         Blood Pressure from Last 3 Encounters:   03/19/18 128/85   02/05/18 122/83   07/26/17 112/70    Weight from Last 3 Encounters:   03/19/18 263 lb (119.3 kg)   02/05/18 262 lb (118.8 kg)   07/26/17 269 lb (122 kg)              We Performed the  Following     AUDIOGRAM/TYMPANOGRAM - INTERFACE     COMPREHENSIVE HEARING TEST     HEARING AID CHECK, BINAURAL     TYMPANOMETRY        Primary Care Provider Office Phone # Fax #    Kristen M Kehr, PA-C [phone] [address] [address] [facility] [address]        Equal Access to Services     [facility] SUSI : Hadii aad ku hadasho Soomaali, waaxda luqadaha, qaybta kaalmada adeegyada, waxay idiin hayaan adeeg khelizabeth lacindyn . So [facility] [phone].    ATENCIÓN: Si habla español, tiene a chadwick disposición servicios gratuitos de asistencia lingüística. [facility] [phone].    We comply with applicable federal civil rights laws and [place] laws. We do not discriminate on the basis of race, color, national origin, age, disability, sex, sexual orientation, or gender identity.            Thank you!     Thank you for choosing [facility]  for your care. Our goal is always to provide you with excellent care. Hearing back from our patients is one way we can continue to improve our services. Please take a few minutes to complete the written survey that you may receive in the mail after your visit with us. Thank you!             Your Updated Medication List - Protect others around you: Learn how to safely use, store and throw away your medicines at www.disposemymeds.org.          This list is accurate as of 4/27/18  8:32 AM.  Always use your most recent med list.                   Brand Name Dispense Instructions for use Diagnosis    acetaminophen 325 MG tablet    TYLENOL     Take 325-650 mg by mouth        ibuprofen 200 MG tablet    ADVIL/MOTRIN     Take 600 mg by mouth        venlafaxine 75 MG 24 hr capsule    EFFEXOR-XR    90 capsule    Take 1 capsule (75 mg) by mouth daily    Major depressive disorder, single episode, mild (H)

## 2018-06-12 ENCOUNTER — E-VISIT (OUTPATIENT)
Dept: FAMILY MEDICINE | Facility: CLINIC | Age: 38
End: 2018-06-12
Payer: COMMERCIAL

## 2018-06-12 ENCOUNTER — MYC MEDICAL ADVICE (OUTPATIENT)
Dept: FAMILY MEDICINE | Facility: CLINIC | Age: 38
End: 2018-06-12

## 2018-06-12 DIAGNOSIS — F32.0 MAJOR DEPRESSIVE DISORDER, SINGLE EPISODE, MILD (H): Primary | ICD-10-CM

## 2018-06-12 PROCEDURE — 99444 ZZC PHYSICIAN ONLINE EVALUATION & MANAGEMENT SERVICE: CPT | Performed by: PHYSICIAN ASSISTANT

## 2018-06-12 ASSESSMENT — PATIENT HEALTH QUESTIONNAIRE - PHQ9
SUM OF ALL RESPONSES TO PHQ QUESTIONS 1-9: 8
10. IF YOU CHECKED OFF ANY PROBLEMS, HOW DIFFICULT HAVE THESE PROBLEMS MADE IT FOR YOU TO DO YOUR WORK, TAKE CARE OF THINGS AT HOME, OR GET ALONG WITH OTHER PEOPLE: SOMEWHAT DIFFICULT
SUM OF ALL RESPONSES TO PHQ QUESTIONS 1-9: 8

## 2018-06-12 ASSESSMENT — ANXIETY QUESTIONNAIRES
6. BECOMING EASILY ANNOYED OR IRRITABLE: SEVERAL DAYS
7. FEELING AFRAID AS IF SOMETHING AWFUL MIGHT HAPPEN: NOT AT ALL
2. NOT BEING ABLE TO STOP OR CONTROL WORRYING: MORE THAN HALF THE DAYS
GAD7 TOTAL SCORE: 7
5. BEING SO RESTLESS THAT IT IS HARD TO SIT STILL: SEVERAL DAYS
3. WORRYING TOO MUCH ABOUT DIFFERENT THINGS: SEVERAL DAYS
GAD7 TOTAL SCORE: 7
GAD7 TOTAL SCORE: 7
1. FEELING NERVOUS, ANXIOUS, OR ON EDGE: SEVERAL DAYS
4. TROUBLE RELAXING: SEVERAL DAYS
7. FEELING AFRAID AS IF SOMETHING AWFUL MIGHT HAPPEN: NOT AT ALL

## 2018-06-12 NOTE — TELEPHONE ENCOUNTER
Patient advise Evisit.  Questionnaires and instructions sent to patient to complete.    See for details.  Monie Nielsen RN

## 2018-06-13 RX ORDER — VENLAFAXINE HYDROCHLORIDE 37.5 MG/1
CAPSULE, EXTENDED RELEASE ORAL
Qty: 10 CAPSULE | Refills: 0 | Status: SHIPPED | OUTPATIENT
Start: 2018-06-13 | End: 2018-06-27 | Stop reason: ALTCHOICE

## 2018-06-13 ASSESSMENT — ANXIETY QUESTIONNAIRES: GAD7 TOTAL SCORE: 7

## 2018-06-13 ASSESSMENT — PATIENT HEALTH QUESTIONNAIRE - PHQ9: SUM OF ALL RESPONSES TO PHQ QUESTIONS 1-9: 8

## 2018-06-27 ENCOUNTER — MYC MEDICAL ADVICE (OUTPATIENT)
Dept: FAMILY MEDICINE | Facility: CLINIC | Age: 38
End: 2018-06-27

## 2018-06-27 ASSESSMENT — ANXIETY QUESTIONNAIRES
3. WORRYING TOO MUCH ABOUT DIFFERENT THINGS: MORE THAN HALF THE DAYS
6. BECOMING EASILY ANNOYED OR IRRITABLE: SEVERAL DAYS
5. BEING SO RESTLESS THAT IT IS HARD TO SIT STILL: SEVERAL DAYS
7. FEELING AFRAID AS IF SOMETHING AWFUL MIGHT HAPPEN: NOT AT ALL
2. NOT BEING ABLE TO STOP OR CONTROL WORRYING: SEVERAL DAYS
GAD7 TOTAL SCORE: 7
4. TROUBLE RELAXING: SEVERAL DAYS
7. FEELING AFRAID AS IF SOMETHING AWFUL MIGHT HAPPEN: NOT AT ALL
1. FEELING NERVOUS, ANXIOUS, OR ON EDGE: SEVERAL DAYS
GAD7 TOTAL SCORE: 7
GAD7 TOTAL SCORE: 7

## 2018-06-27 ASSESSMENT — PATIENT HEALTH QUESTIONNAIRE - PHQ9
10. IF YOU CHECKED OFF ANY PROBLEMS, HOW DIFFICULT HAVE THESE PROBLEMS MADE IT FOR YOU TO DO YOUR WORK, TAKE CARE OF THINGS AT HOME, OR GET ALONG WITH OTHER PEOPLE: SOMEWHAT DIFFICULT
SUM OF ALL RESPONSES TO PHQ QUESTIONS 1-9: 4
SUM OF ALL RESPONSES TO PHQ QUESTIONS 1-9: 4

## 2018-06-27 NOTE — TELEPHONE ENCOUNTER
Per protocol, will route encounter to be cosigned by provider for Verbal Orders.  Monie Nielsen RN

## 2018-06-28 ASSESSMENT — PATIENT HEALTH QUESTIONNAIRE - PHQ9: SUM OF ALL RESPONSES TO PHQ QUESTIONS 1-9: 4

## 2018-06-28 ASSESSMENT — ANXIETY QUESTIONNAIRES: GAD7 TOTAL SCORE: 7

## 2018-07-30 ENCOUNTER — OFFICE VISIT (OUTPATIENT)
Dept: INTERNAL MEDICINE | Facility: CLINIC | Age: 38
End: 2018-07-30
Payer: COMMERCIAL

## 2018-07-30 VITALS
TEMPERATURE: 98.4 F | BODY MASS INDEX: 40.14 KG/M2 | OXYGEN SATURATION: 100 % | HEIGHT: 69 IN | SYSTOLIC BLOOD PRESSURE: 117 MMHG | DIASTOLIC BLOOD PRESSURE: 81 MMHG | WEIGHT: 271 LBS | HEART RATE: 93 BPM

## 2018-07-30 DIAGNOSIS — M79.662 PAIN OF LEFT CALF: Primary | ICD-10-CM

## 2018-07-30 DIAGNOSIS — E66.01 MORBID OBESITY (H): ICD-10-CM

## 2018-07-30 PROCEDURE — 99214 OFFICE O/P EST MOD 30 MIN: CPT | Performed by: INTERNAL MEDICINE

## 2018-07-30 RX ORDER — KETOROLAC TROMETHAMINE 10 MG/1
10 TABLET, FILM COATED ORAL EVERY 6 HOURS PRN
Qty: 20 TABLET | Refills: 0 | Status: SHIPPED | OUTPATIENT
Start: 2018-07-30 | End: 2019-03-25

## 2018-07-30 ASSESSMENT — PAIN SCALES - GENERAL: PAINLEVEL: MILD PAIN (2)

## 2018-07-30 NOTE — PATIENT INSTRUCTIONS
For your left leg pain:  Please take the Toradol per package instructions. Do not take Aleve (naproxen), aspirin, ibuprofen (Motrin, Advil) or Excedrin on the same day that you take the Toradol.  Take it with food or milk and please drink enough fluids, 6-8 cups of non-caffeinated beverages a day.  Use cooling or heating packs-whatever makes your pain better.  Avoid your walking exercises for 2 weeks.  Please contact our clinic if your not better after 2 weeks of the above treatment (we will likely order an ultrasound of your arteries).     If weakness of the affected area develops, please let us know.  If numbness of affected area develops or gets worse, please let us know.

## 2018-07-30 NOTE — PROGRESS NOTES
SUBJECTIVE:   Shala Poole is a 38 year old female who presents to clinic today for the following health issues:      Left calf pain     Duration: 2 months, getting worse over the last 2 weeks    Description (location/character/radiation): left calf, dull pain    Intensity:  Moderate 9/10 at the worst, 2/10 currently     Accompanying signs and symptoms: none    History (similar episodes/previous evaluation): None    Precipitating or alleviating factors: worse with walking     Therapies tried and outcome: tried to stretch out the left lower extremity without success; Ibuprofen: 400mg q6h, without a noted benefit.      The calf pain usually does not occur at rest, only after walking and not right away with walking-only after about 2.5 miles of walking.      No color changes of the left foot have been noted. No numbness.     No recent CP or shortness of breath or coughing.     Patient's father may have had blood clots; he passed away; patient is not sure if he was on blood thinners so the patient is not sure if that was d/t VTE or heart disease.       Reviewed and updated as needed this visit by clinical staff  Tobacco  Allergies  Meds  Problems  Med Hx  Surg Hx  Fam Hx  Soc Hx        Reviewed and updated as needed this visit by Provider  Meds  Problems           Patient Active Problem List   Diagnosis     Major depressive disorder, single episode, mild (H)     CARDIOVASCULAR SCREENING; LDL GOAL LESS THAN 160     Morbid obesity (H)       Past Medical History:   Diagnosis Date     Depressive disorder 2003       Past Surgical History:   Procedure Laterality Date     HYSTERECTOMY, PAP NO LONGER INDICATED  12/2014    due to excessive bleeding - ovaries remain     ORTHOPEDIC SURGERY      cyst removal from left wrist        Family History   Problem Relation Age of Onset     Other - See Comments Mother      Fibromyalga.      Thyroid Disease Mother      Depression Mother      Diabetes Father      Chronic  "Obstructive Pulmonary Disease Father      Asthma Father      Heart Failure Father      Obesity Father      Diabetes Paternal Grandmother      Colon Cancer Other        Social History   Substance Use Topics     Smoking status: Never Smoker     Smokeless tobacco: Never Used     Alcohol use Yes      Comment: Some weekends a couple drinks socially       Current Outpatient Prescriptions   Medication     ketorolac (TORADOL) 10 MG tablet     acetaminophen (TYLENOL) 325 MG tablet     No current facility-administered medications for this visit.          ROS:  Constitutional, HEENT, cardiovascular, pulmonary, GI, , musculoskeletal, neuro, skin, endocrine and psych systems are negative, except as otherwise noted.     OBJECTIVE:                                                    /81  Pulse 93  Temp 98.4  F (36.9  C) (Oral)  Ht 5' 8.5\" (1.74 m)  Wt 271 lb (122.9 kg)  SpO2 100%  Breastfeeding? No  BMI 40.61 kg/m2     GENERAL APPEARANCE: healthy, alert and in no distress  EYES: Eyes grossly normal to inspection, and conjunctivae and sclerae normal  HENT: head normocephalic and atraumatic and mouth without ulcers or lesions, oropharynx clear and oral mucous membranes moist  NECK: no noticeable adenopathy, no asymmetry, masses, or scars   RESP: lungs clear to auscultation - no rales, rhonchi or wheezes  CV: regular rate and rhythm, normal S1 S2, no S3 or S4, no murmur, click or rub, no peripheral edema and peripheral pulses strong  ABDOMEN: soft, nontender, no hepatosplenomegaly, no masses and bowel sounds normal  MS:   the calf diameter of the *left* (affected) left lower extremity is 47cm and of the right lower extremity was actually bigger, at 48cm. The circulation (capillary refill, and DP and TA of the left lower extremity were within normal limits).   ow, no musculoskeletal defects are noted and gait is age appropriate without ataxia  SKIN: no suspicious lesions or rashes  NEURO: mentation intact and speech " normal  PSYCH: mentation appears normal and affect normal/bright.    Results for orders placed or performed in visit on 04/24/17   Lipid panel reflex to direct LDL   Result Value Ref Range    Cholesterol 152 <200 mg/dL    Triglycerides 230 (H) <150 mg/dL    HDL Cholesterol 41 (L) >49 mg/dL    LDL Cholesterol Calculated 65 <100 mg/dL    Non HDL Cholesterol 111 <130 mg/dL   Glucose whole blood   Result Value Ref Range    Glucose Whole Blood 103 (H) 70 - 99 mg/dL       No results found for this or any previous visit (from the past 744 hour(s)).      ASSESSMENT/PLAN:                                                        ICD-10-CM    1. Pain of left calf M79.662 ketorolac (TORADOL) 10 MG tablet   2. Morbid obesity (H) E66.01      1: ASSESSMENT: muscle strain vs claudication (the latter less likely as no rfs); DVT very unlikely as very few rfs. PLAN: below.  2: Body mass index is 40.61 kg/(m^2). contributing to #1.    Patient Instructions   For your left leg pain:  Please take the Toradol per package instructions. Do not take Aleve (naproxen), aspirin, ibuprofen (Motrin, Advil) or Excedrin on the same day that you take the Toradol.  Take it with food or milk and please drink enough fluids, 6-8 cups of non-caffeinated beverages a day.  Use cooling or heating packs-whatever makes your pain better.  Avoid your walking exercises for 2 weeks.  Please contact our clinic if your not better after 2 weeks of the above treatment (we will likely order an ultrasound of your arteries).     If weakness of the affected area develops, please let us know.  If numbness of affected area develops or gets worse, please let us know.        Анна Shannon MD    [facility]  [id number] [place] [id number] [phone]

## 2018-07-30 NOTE — MR AVS SNAPSHOT
After Visit Summary   7/30/2018    Shala Poole    MRN: [id number]           Patient Information     Date Of Birth          1980        Visit Information        Provider Department      7/30/2018 5:30 PM Анна Shannon MD [facility]        Today's Diagnoses     Pain of left calf    -  1      Care Instructions    For your left leg pain:  Please take the Toradol per package instructions. Do not take Aleve (naproxen), aspirin, ibuprofen (Motrin, Advil) or Excedrin on the same day that you take the Toradol.  Take it with food or milk and please drink enough fluids, 6-8 cups of non-caffeinated beverages a day.  Use cooling or heating packs-whatever makes your pain better.  Avoid your walking exercises for 2 weeks.  Please contact our clinic if your not better after 2 weeks of the above treatment (we will likely order an ultrasound of your arteries).     If weakness of the affected area develops, please let us know.  If numbness of affected area develops or gets worse, please let us know.            Follow-ups after your visit        Who to contact     If you have questions or need follow up information about today's clinic visit or your schedule please contact [facility] directly at [phone].  Normal or non-critical lab and imaging results will be communicated to you by MyChart, letter or phone within 4 business days after the clinic has received the results. If you do not hear from us within 7 days, please contact the clinic through [employer] or phone. If you have a critical or abnormal lab result, we will notify you by phone as soon as possible.  Submit refill requests through [employer] or call your pharmacy and they will forward the refill request to us. Please allow 3 business days for your refill to be completed.          Additional Information About Your Visit        [employer] Information     [employer] gives you secure access to your electronic health  "record. If you see a primary care provider, you can also send messages to your care team and make appointments. If you have questions, please call your primary care clinic.  If you do not have a primary care provider, please call [phone] and they will assist you.        Care EveryWhere ID     This is your Care EveryWhere ID. This could be used by other organizations to access your [facility] medical records  [id number]        Your Vitals Were     Pulse Temperature Height Pulse Oximetry Breastfeeding? BMI (Body Mass Index)    93 98.4  F (36.9  C) (Oral) 5' 8.5\" (1.74 m) 100% No 40.61 kg/m2       Blood Pressure from Last 3 Encounters:   07/30/18 117/81   03/19/18 128/85   02/05/18 122/83    Weight from Last 3 Encounters:   07/30/18 271 lb (122.9 kg)   03/19/18 263 lb (119.3 kg)   02/05/18 262 lb (118.8 kg)              Today, you had the following     No orders found for display         Today's Medication Changes          These changes are accurate as of 7/30/18  6:13 PM.  If you have any questions, ask your nurse or doctor.               Start taking these medicines.        Dose/Directions    ketorolac 10 MG tablet   Commonly known as:  TORADOL   Used for:  Pain of left calf   Started by:  Анна Shannon MD        Dose:  10 mg   Take 1 tablet (10 mg) by mouth every 6 hours as needed   Quantity:  20 tablet   Refills:  0            Where to get your medicines      [facility] [address] [facility]  [address] [facility] [address]    Hours:  24-hours Phone:  [phone]     ketorolac 10 MG tablet                Primary Care Provider Office Phone # Fax #    Kristen M Kehr, PA-C [phone] [address] [facility] [address]        Equal Access to Services     ADAM GOLDMAN AH: Hadii juliocesar ramos Soisai, waaxda luqadaha, qaybta kaalmada marielleda, bakari reyna " aravind coloradofredtony maMabletequila ah. So [facility] [phone].    ATENCIÓN: Si habla tad, tiene a chadwick disposición servicios gratuitos de asistencia lingüística. Nnamdi al [phone].    We comply with applicable federal civil rights laws and [place] laws. We do not discriminate on the basis of race, color, national origin, age, disability, sex, sexual orientation, or gender identity.            Thank you!     Thank you for choosing [facility]  for your care. Our goal is always to provide you with excellent care. Hearing back from our patients is one way we can continue to improve our services. Please take a few minutes to complete the written survey that you may receive in the mail after your visit with us. Thank you!             Your Updated Medication List - Protect others around you: Learn how to safely use, store and throw away your medicines at www.disposemymeds.org.          This list is accurate as of 7/30/18  6:13 PM.  Always use your most recent med list.                   Brand Name Dispense Instructions for use Diagnosis    acetaminophen 325 MG tablet    TYLENOL     Take 325-650 mg by mouth        ketorolac 10 MG tablet    TORADOL    20 tablet    Take 1 tablet (10 mg) by mouth every 6 hours as needed    Pain of left calf

## 2018-07-31 PROBLEM — E66.01 MORBID OBESITY (H): Status: ACTIVE | Noted: 2018-07-31

## 2019-02-13 ENCOUNTER — TELEPHONE (OUTPATIENT)
Dept: AUDIOLOGY | Facility: CLINIC | Age: 39
End: 2019-02-13

## 2019-02-13 ENCOUNTER — ALLIED HEALTH/NURSE VISIT (OUTPATIENT)
Dept: AUDIOLOGY | Facility: CLINIC | Age: 39
End: 2019-02-13

## 2019-02-13 DIAGNOSIS — H90.3 SENSORINEURAL HEARING LOSS, BILATERAL: Primary | ICD-10-CM

## 2019-02-13 PROCEDURE — 99207 ZZC NO CHARGE LOS: CPT | Performed by: AUDIOLOGIST

## 2019-02-13 PROCEDURE — V5299 HEARING SERVICE: HCPCS | Performed by: AUDIOLOGIST

## 2019-02-13 NOTE — PROGRESS NOTES
HEARING AID RECHECK    Patient Name:  Shala Poole    Patient Age:   39 year old    :  1980    Background:   Patient dropped off her left hearing aid, Resound  DRW, SN [id number] for repair.      Procedures:    wire was broken.  I replaced it with a new Left #2 HP  and did a listening check to verify sound quality and gain were good.  Patient agreed to $125 charge over telephone.    Plan:   I called patient and told her the hearing aid is ready for pickup at the 2nd floor reception desk in [place].    Charges:    .004 Out of warranty  repair:  Bill Patient Directly    Gunnar Guillory MA, CCC-A  [place] Licensed Audiologist #[id number]  2019

## 2019-03-22 ASSESSMENT — ENCOUNTER SYMPTOMS
BREAST MASS: 0
SORE THROAT: 0
PALPITATIONS: 0
FREQUENCY: 0
DIARRHEA: 0
COUGH: 1
NAUSEA: 0
DYSURIA: 0
FEVER: 0
PARESTHESIAS: 0
DIZZINESS: 1
SHORTNESS OF BREATH: 0
EYE PAIN: 0
ARTHRALGIAS: 1
CONSTIPATION: 0
WEAKNESS: 0
HEMATURIA: 0
ABDOMINAL PAIN: 0
HEMATOCHEZIA: 0
HEADACHES: 1
CHILLS: 0
HEARTBURN: 1
MYALGIAS: 0
JOINT SWELLING: 0
NERVOUS/ANXIOUS: 1

## 2019-03-25 ENCOUNTER — OFFICE VISIT (OUTPATIENT)
Dept: FAMILY MEDICINE | Facility: CLINIC | Age: 39
End: 2019-03-25
Payer: COMMERCIAL

## 2019-03-25 VITALS
HEIGHT: 68 IN | SYSTOLIC BLOOD PRESSURE: 134 MMHG | BODY MASS INDEX: 41.22 KG/M2 | TEMPERATURE: 98.2 F | HEART RATE: 100 BPM | OXYGEN SATURATION: 98 % | WEIGHT: 272 LBS | DIASTOLIC BLOOD PRESSURE: 85 MMHG

## 2019-03-25 DIAGNOSIS — R73.01 ELEVATED FASTING GLUCOSE: ICD-10-CM

## 2019-03-25 DIAGNOSIS — E78.2 ELEVATED CHOLESTEROL WITH HIGH TRIGLYCERIDES: ICD-10-CM

## 2019-03-25 DIAGNOSIS — E66.01 MORBID OBESITY (H): ICD-10-CM

## 2019-03-25 DIAGNOSIS — Z00.00 ROUTINE GENERAL MEDICAL EXAMINATION AT A HEALTH CARE FACILITY: Primary | ICD-10-CM

## 2019-03-25 DIAGNOSIS — F32.0 MAJOR DEPRESSIVE DISORDER, SINGLE EPISODE, MILD (H): ICD-10-CM

## 2019-03-25 DIAGNOSIS — Z23 NEED FOR TDAP VACCINATION: ICD-10-CM

## 2019-03-25 PROCEDURE — 90715 TDAP VACCINE 7 YRS/> IM: CPT | Performed by: PHYSICIAN ASSISTANT

## 2019-03-25 PROCEDURE — 99395 PREV VISIT EST AGE 18-39: CPT | Mod: 25 | Performed by: PHYSICIAN ASSISTANT

## 2019-03-25 PROCEDURE — 90471 IMMUNIZATION ADMIN: CPT | Performed by: PHYSICIAN ASSISTANT

## 2019-03-25 ASSESSMENT — ANXIETY QUESTIONNAIRES
6. BECOMING EASILY ANNOYED OR IRRITABLE: SEVERAL DAYS
2. NOT BEING ABLE TO STOP OR CONTROL WORRYING: SEVERAL DAYS
GAD7 TOTAL SCORE: 5
7. FEELING AFRAID AS IF SOMETHING AWFUL MIGHT HAPPEN: NOT AT ALL
1. FEELING NERVOUS, ANXIOUS, OR ON EDGE: SEVERAL DAYS
3. WORRYING TOO MUCH ABOUT DIFFERENT THINGS: NOT AT ALL
5. BEING SO RESTLESS THAT IT IS HARD TO SIT STILL: SEVERAL DAYS
IF YOU CHECKED OFF ANY PROBLEMS ON THIS QUESTIONNAIRE, HOW DIFFICULT HAVE THESE PROBLEMS MADE IT FOR YOU TO DO YOUR WORK, TAKE CARE OF THINGS AT HOME, OR GET ALONG WITH OTHER PEOPLE: NOT DIFFICULT AT ALL

## 2019-03-25 ASSESSMENT — PATIENT HEALTH QUESTIONNAIRE - PHQ9
SUM OF ALL RESPONSES TO PHQ QUESTIONS 1-9: 0
5. POOR APPETITE OR OVEREATING: SEVERAL DAYS

## 2019-03-25 ASSESSMENT — ENCOUNTER SYMPTOMS
SORE THROAT: 0
PALPITATIONS: 0
WEAKNESS: 0
HEMATURIA: 0
SHORTNESS OF BREATH: 0
DIARRHEA: 0
FREQUENCY: 0
BREAST MASS: 0
CONSTIPATION: 0
ARTHRALGIAS: 1
HEMATOCHEZIA: 0
FEVER: 0
JOINT SWELLING: 0
HEADACHES: 1
CHILLS: 0
DYSURIA: 0
EYE PAIN: 0
PARESTHESIAS: 0
ABDOMINAL PAIN: 0
MYALGIAS: 0
NAUSEA: 0

## 2019-03-25 ASSESSMENT — MIFFLIN-ST. JEOR: SCORE: 1962.28

## 2019-03-25 ASSESSMENT — PAIN SCALES - GENERAL: PAINLEVEL: NO PAIN (0)

## 2019-03-25 NOTE — PROGRESS NOTES
SUBJECTIVE:   CC: Shala Poole is an 39 year old woman who presents for preventive health visit.     Physical   Annual:     Getting at least 3 servings of Calcium per day:  Yes    Bi-annual eye exam:  Yes    Dental care twice a year:  Yes    Sleep apnea or symptoms of sleep apnea:  None    Diet:  Regular (no restrictions)    Frequency of exercise:  None    Taking medications regularly:  Yes    Medication side effects:  None    Additional concerns today:  No    PHQ-2 Total Score: 0        Today's PHQ-2 Score:   PHQ-2 ( 1999 Pfizer) 3/22/2019   Q1: Little interest or pleasure in doing things 0   Q2: Feeling down, depressed or hopeless 0   PHQ-2 Score 0   Q1: Little interest or pleasure in doing things Not at all   Q2: Feeling down, depressed or hopeless Not at all   PHQ-2 Score 0       Abuse: Current or Past(Physical, Sexual or Emotional)- No  Do you feel safe in your environment? Yes    Social History     Tobacco Use     Smoking status: Never Smoker     Smokeless tobacco: Never Used   Substance Use Topics     Alcohol use: Yes     Comment: Some weekends a couple drinks socially     Alcohol Use 3/22/2019   If you drink alcohol do you typically have greater than 3 drinks per day OR greater than 7 drinks per week? No   No flowsheet data found.    Reviewed orders with patient.  Reviewed health maintenance and updated orders accordingly - Yes  BP Readings from Last 3 Encounters:   03/25/19 134/85   07/30/18 117/81   03/19/18 128/85    Wt Readings from Last 3 Encounters:   03/25/19 123.4 kg (272 lb)   07/30/18 122.9 kg (271 lb)   03/19/18 119.3 kg (263 lb)                  Patient Active Problem List   Diagnosis     Major depressive disorder, single episode, mild (H)     CARDIOVASCULAR SCREENING; LDL GOAL LESS THAN 160     Morbid obesity (H)     Past Surgical History:   Procedure Laterality Date     HYSTERECTOMY, PAP NO LONGER INDICATED  12/2014    due to excessive bleeding - ovaries remain     ORTHOPEDIC SURGERY       cyst removal from left wrist        Social History     Tobacco Use     Smoking status: Never Smoker     Smokeless tobacco: Never Used   Substance Use Topics     Alcohol use: Yes     Comment: Some weekends a couple drinks socially     Family History   Problem Relation Age of Onset     Other - See Comments Mother         Fibromyalga.      Thyroid Disease Mother      Depression Mother      Diabetes Father      Chronic Obstructive Pulmonary Disease Father      Asthma Father      Heart Failure Father      Obesity Father      Diabetes Paternal Grandmother      Colon Cancer Other          Current Outpatient Medications   Medication Sig Dispense Refill     acetaminophen (TYLENOL) 325 MG tablet Take 325-650 mg by mouth       FLUoxetine (PROZAC) 20 MG capsule TK 1 C PO D  1     No Known Allergies    Mammogram not appropriate for this patient based on age.    Pertinent mammograms are reviewed under the imaging tab.  History of abnormal Pap smear: NO - age 30-65 PAP every 5 years with negative HPV co-testing recommended  Last 3 Pap and HPV Results:       Reviewed and updated as needed this visit by clinical staff  Tobacco  Allergies  Meds  Med Hx  Surg Hx  Fam Hx  Soc Hx        Reviewed and updated as needed this visit by Provider        Past Medical History:   Diagnosis Date     Depressive disorder 2003      Past Surgical History:   Procedure Laterality Date     HYSTERECTOMY, PAP NO LONGER INDICATED  12/2014    due to excessive bleeding - ovaries remain     ORTHOPEDIC SURGERY      cyst removal from left wrist        Review of Systems   Constitutional: Negative for chills and fever.   HENT: Positive for congestion (current viral uri) and hearing loss (wears hearing aides). Negative for ear pain and sore throat.    Eyes: Negative for pain and visual disturbance.   Respiratory: Positive for cough (current viral uri). Negative for shortness of breath.    Cardiovascular: Negative for chest pain, palpitations and peripheral  "edema.   Gastrointestinal: Positive for heartburn (uses omeprazole). Negative for abdominal pain, constipation, diarrhea, hematochezia and nausea.   Breasts:  Negative for tenderness, breast mass and discharge.   Genitourinary: Negative for dysuria, frequency, genital sores, hematuria, pelvic pain, urgency, vaginal bleeding and vaginal discharge.   Musculoskeletal: Positive for arthralgias. Negative for joint swelling and myalgias.   Skin: Positive for rash (on left breast, ringworm, resolving with OTC lotramin).   Neurological: Positive for dizziness (viral uri) and headaches. Negative for weakness and paresthesias.   Psychiatric/Behavioral: Negative for mood changes. The patient is nervous/anxious (treated by Psychiatry).         OBJECTIVE:   /85   Pulse 100   Temp 98.2  F (36.8  C) (Oral)   Ht 1.727 m (5' 8\")   Wt 123.4 kg (272 lb)   SpO2 98%   BMI 41.36 kg/m    Physical Exam  GENERAL: healthy, alert and no distress  EYES: Eyes grossly normal to inspection, PERRL and conjunctivae and sclerae normal  HENT: ear canals and TM's normal, nose and mouth without ulcers or lesions  NECK: no adenopathy, no asymmetry, masses, or scars and thyroid normal to palpation  RESP: lungs clear to auscultation - no rales, rhonchi or wheezes  BREAST: normal without masses, tenderness or nipple discharge and no palpable axillary masses or adenopathy  CV: regular rate and rhythm, normal S1 S2, no S3 or S4, no murmur, click or rub, no peripheral edema and peripheral pulses strong  ABDOMEN: soft, nontender, no hepatosplenomegaly, no masses and bowel sounds normal   (female): normal female external genitalia, normal urethral meatus, vaginal mucosa pink, moist, well rugated, and normal cervix/adnexa/uterus without masses or discharge  MS: no gross musculoskeletal defects noted, no edema  SKIN: fiant red circular lesion / flat on the left breast.   NEURO: Normal strength and tone, mentation intact and speech normal  PSYCH: " "mentation appears normal, affect normal/bright    Diagnostic Test Results:  none     ASSESSMENT/PLAN:   1. Routine general medical examination at a health care facility  Health maintenance reviewed and updated.    2. Need for Tdap vaccination  - TDAP, IM (10 - 64 YRS) - Adacel    3. Elevated fasting glucose  Future appointment scheduled  - **Glucose FUTURE anytime; Future    4. Elevated cholesterol with high triglycerides  Future appointment scheduled.   - Lipid panel reflex to direct LDL Fasting; Future    5. Morbid obesity (H)  Encouraged to make lifestyle changes with diet and exercise.     6. Major depressive disorder, single episode, mild (H)  Working with Psychiatry.       COUNSELING:  Reviewed preventive health counseling, as reflected in patient instructions       Regular exercise       Healthy diet/nutrition    BP Readings from Last 1 Encounters:   03/25/19 134/85     Estimated body mass index is 41.36 kg/m  as calculated from the following:    Height as of this encounter: 1.727 m (5' 8\").    Weight as of this encounter: 123.4 kg (272 lb).    BP Screening:   Last 3 BP Readings:    BP Readings from Last 3 Encounters:   03/25/19 134/85   07/30/18 117/81   03/19/18 128/85       The following was recommended to the patient:  Re-screen BP within a year and recommended lifestyle modifications  Weight management plan: Discussed healthy diet and exercise guidelines     reports that  has never smoked. she has never used smokeless tobacco.      Counseling Resources:  ATP IV Guidelines  Pooled Cohorts Equation Calculator  Breast Cancer Risk Calculator  FRAX Risk Assessment  ICSI Preventive Guidelines  Dietary Guidelines for Americans, 2010  USDA's MyPlate  ASA Prophylaxis  Lung CA Screening    Kristen M. Kehr, PA-C  [facility]    Screening Questionnaire for Adult Immunization    Are you sick today?   No   Do you have allergies to medications, food, a vaccine component or latex?   No   Have you ever had a " serious reaction after receiving a vaccination?   No   Do you have a long-term health problem with heart disease, lung disease, asthma, kidney disease, metabolic disease (e.g. diabetes), anemia, or other blood disorder?   No   Do you have cancer, leukemia, HIV/AIDS, or any other immune system problem?   No   In the past 3 months, have you taken medications that affect  your immune system, such as prednisone, other steroids, or anticancer drugs; drugs for the treatment of rheumatoid arthritis, Crohn s disease, or psoriasis; or have you had radiation treatments?   No   Have you had a seizure, or a brain or other nervous system problem?   No   During the past year, have you received a transfusion of blood or blood     products, or been given immune (gamma) globulin or antiviral drug?   No   For women: Are you pregnant or is there a chance you could become        pregnant during the next month?   No   Have you received any vaccinations in the past 4 weeks?   No     Immunization questionnaire answers were all negative.        Per orders of Kehr,Kristen PA-C injection of adacel given by Thomas Chapman. Patient instructed to remain in clinic for 15 minutes afterwards, and to report any adverse reaction to me immediately.       Screening performed by Thomas Chapman on 3/25/2019 at 6:16 PM.

## 2019-03-25 NOTE — NURSING NOTE
"Chief Complaint   Patient presents with     Physical       Initial /85   Pulse 100   Temp 98.2  F (36.8  C) (Oral)   Ht 1.727 m (5' 8\")   Wt 123.4 kg (272 lb)   SpO2 98%   BMI 41.36 kg/m   Estimated body mass index is 41.36 kg/m  as calculated from the following:    Height as of this encounter: 1.727 m (5' 8\").    Weight as of this encounter: 123.4 kg (272 lb).  Medication Reconciliation: complete    KENNETH Chapman MA    "

## 2019-03-26 ASSESSMENT — ENCOUNTER SYMPTOMS
HEARTBURN: 1
COUGH: 1
DIZZINESS: 1
NERVOUS/ANXIOUS: 1

## 2019-03-26 ASSESSMENT — ANXIETY QUESTIONNAIRES: GAD7 TOTAL SCORE: 5

## 2019-04-13 DIAGNOSIS — E78.2 ELEVATED CHOLESTEROL WITH HIGH TRIGLYCERIDES: ICD-10-CM

## 2019-04-13 DIAGNOSIS — R73.01 ELEVATED FASTING GLUCOSE: ICD-10-CM

## 2019-04-13 PROCEDURE — 36415 COLL VENOUS BLD VENIPUNCTURE: CPT | Performed by: PHYSICIAN ASSISTANT

## 2019-04-13 PROCEDURE — 82947 ASSAY GLUCOSE BLOOD QUANT: CPT | Performed by: PHYSICIAN ASSISTANT

## 2019-04-13 PROCEDURE — 80061 LIPID PANEL: CPT | Performed by: PHYSICIAN ASSISTANT

## 2019-04-15 LAB
CHOLEST SERPL-MCNC: 180 MG/DL
GLUCOSE SERPL-MCNC: 94 MG/DL (ref 70–99)
HDLC SERPL-MCNC: 34 MG/DL
LDLC SERPL CALC-MCNC: 95 MG/DL
NONHDLC SERPL-MCNC: 146 MG/DL
TRIGL SERPL-MCNC: 253 MG/DL

## 2019-04-16 ENCOUNTER — MYC MEDICAL ADVICE (OUTPATIENT)
Dept: FAMILY MEDICINE | Facility: CLINIC | Age: 39
End: 2019-04-16

## 2019-04-16 DIAGNOSIS — E78.2 MIXED HYPERLIPIDEMIA: Primary | ICD-10-CM

## 2019-04-16 NOTE — TELEPHONE ENCOUNTER
Please update problem list.  Per protocol, will route encounter to be cosigned by provider for Verbal Orders.  Monie Nielsen RN

## 2019-04-16 NOTE — RESULT ENCOUNTER NOTE
The ASCVD Risk score (Kylemauricio ALDRICH Jr., et al., 2013) failed to calculate for the following reasons:    The 2013 ASCVD risk score is only valid for ages 40 to 79

## 2020-01-13 ENCOUNTER — OFFICE VISIT (OUTPATIENT)
Dept: AUDIOLOGY | Facility: CLINIC | Age: 40
End: 2020-01-13
Payer: COMMERCIAL

## 2020-01-13 DIAGNOSIS — H90.3 SENSORINEURAL HEARING LOSS, BILATERAL: Primary | ICD-10-CM

## 2020-01-13 PROCEDURE — V5275 EAR IMPRESSION: HCPCS | Mod: RT | Performed by: AUDIOLOGIST

## 2020-01-13 PROCEDURE — 99207 ZZC NO CHARGE LOS: CPT | Performed by: AUDIOLOGIST

## 2020-01-13 NOTE — PROGRESS NOTES
AUDIOLOGY REPORT: HEARING AID RECHECK    SUBJECTIVE: Shala Poole is a 39 year old female, :  1980, was seen in the Audiology Clinic at [facility] on 20 for a return check of their hearing aids. Patient was unaccompanied to today's visit.     Background:   Patient is here today with the complaint of the right earmold has gone missing and she would like to obtain a replacement. Judging by the left earmold the serial number would no longer be on file at [facility] so a new impression was needed to send to [facility] to have the earmold made.      Procedures:       SIDE: Right    : ReSound    TYPE: Linx 2     S/N: ???     WARRANTY: None    Today we took a new impression of the right ear without incident. Ears were clear of cerumen bilaterally.     Plan:   Patient will be scheduled for an earmold fitting in 2-3 weeks. Patient will return as needed for hearing aid concerns.     CHARGES:    Earmold impression    [place] CCC-A  Licensed Audiologist #[id number]  2020

## 2020-01-29 ENCOUNTER — OFFICE VISIT (OUTPATIENT)
Dept: AUDIOLOGY | Facility: CLINIC | Age: 40
End: 2020-01-29
Payer: COMMERCIAL

## 2020-01-29 DIAGNOSIS — H90.3 SENSORINEURAL HEARING LOSS, BILATERAL: Primary | ICD-10-CM

## 2020-01-29 PROCEDURE — 99207 ZZC NO CHARGE LOS: CPT | Performed by: AUDIOLOGIST

## 2020-01-29 PROCEDURE — V5264 EAR MOLD/INSERT: HCPCS | Mod: RT | Performed by: AUDIOLOGIST

## 2020-01-29 NOTE — PROGRESS NOTES
AUDIOLOGY REPORT    SUBJECTIVE: Shala Poole is a 40 year old female was seen in the Audiology Clinic at  [facility] on 1/29/20 to be fit with new earmold.     OBJECTIVE:    Otoscopy revealed ears are clear of cerumen bilaterally.     New earmold was fit for the right ear. The earmold fit comfortably and securely. Patient reports good physical fit and comfort with the new earmold. Patient demonstrated ease of insertion and removal of the new earmold. The serial number of the new earmold is [id number].    ASSESSMENT:   Sensorineural hearing loss bilaterally     Reviewed warranty information with patient. The 90 day remake period was explained to patient.    PLAN: Shala will evaluate the new earmold and return if further adjustments are needed. Please contact this clinic with any questions or concerns.      [place] CCC-A  Licensed Audiologist #[id number]  1/29/2020

## 2020-05-22 ENCOUNTER — ALLIED HEALTH/NURSE VISIT (OUTPATIENT)
Dept: AUDIOLOGY | Facility: CLINIC | Age: 40
End: 2020-05-22

## 2020-05-22 DIAGNOSIS — H90.3 SENSORINEURAL HEARING LOSS, BILATERAL: Primary | ICD-10-CM

## 2020-05-22 PROCEDURE — 99207 ZZC NO CHARGE LOS: CPT | Performed by: AUDIOLOGIST

## 2020-05-22 PROCEDURE — V5014 HEARING AID REPAIR/MODIFYING: HCPCS | Performed by: AUDIOLOGIST

## 2020-05-22 NOTE — PROGRESS NOTES
HEARING AID DROP-OFF    Patient Name:  Shala Poole    Patient Age:   40 year old    :  1980    Background:   The patient dropped off her right hearing aid because it had a broken  wire.     SIDE: Right   MAKE: ReSound   MODEL: Linx2   S/N: [id number]   WARRANTY:  2018    Procedures:   Visual inspection revealed a broken  wire. The size 3 HP  was replaced with one from stock and a biological listening check revealed good sound.    Plan:   The patient was called and informed that her hearing aid is ready to .    CHARGES  .005 Out of warranty  replacement ($100)    Neal Weston, CCC-A  Licensed Audiologist  2020

## 2020-08-10 ENCOUNTER — VIRTUAL VISIT (OUTPATIENT)
Dept: FAMILY MEDICINE | Facility: OTHER | Age: 40
End: 2020-08-10

## 2020-08-10 NOTE — PROGRESS NOTES
"Date: 08/10/2020 16:11:38  Clinician: Nicola Hawkins  Clinician NPI: [id number]  Patient: Shala Poole  Patient : 1980  Patient Address: [address] [place] [address] [place] [address]  Patient Phone: [phone]  Visit Protocol: Yeast infection  Patient Summary:  Shala is a 40 year old ( : 1980 ) female who initiated a Visit for a presumed vaginal yeast infection. When asked the question \"Please sign me up to receive news, health information and promotions. \", Shala responded \"No\".    Shala began noticing vaginal pruritus, vaginal irritation, and vaginal burning 1-3 days ago.   She denies having blisters, open sores, vaginal discharge, and abdominal pain. She also denies feeling feverish.   She has not tried to treat her current symptoms with any medication.   Precipitating events  Shala denies having a sexually transmitted disease.   Pertinent medical history  Shala has a history of vaginal yeast infections. She has had zero (0) occurrences in the past year and the current symptoms are similar to previous yeast infections. She is not sure if she has used fluconazole (Diflucan) to treat previous yeast infections.   She prefers a pill. She denies taking antibiotics in the past 2 weeks.   She does NOT smoke or use smokeless tobacco.   She denies pregnancy and denies breastfeeding. She does not menstruate.      MEDICATIONS: fluoxetine oral, ALLERGIES: NKDA  Clinician Response:  Dear Shala,  Based on the information you have provided, you likely have a vaginal yeast infection which is a common infection of the vagina caused by a fungus. Yeast are a type of fungus.  The most common symptom of a yeast infection is itching in and around the vagina. Other signs and symptoms include burning, redness, or a thick, white vaginal discharge that looks like cottage cheese and does not have a bad smell.  Medication information  I am prescribing:     Fluconazole (Diflucan) 150 mg oral tablet. Take 1 tablet " by mouth in a single dose. There are no refills with this prescription.   Self care  Steps you can take to prevent symptoms of future vaginal yeast infection:     Avoid irritants such as scented bath products, tampons, pads, or vaginal sprays and powders    Avoid douching    Wear cotton underwear and if you are comfortable doing so, do not wear underwear to bed    Avoid hot tubs and whirlpool spas     When to seek care  Most women notice improvement in their symptoms within 1-2 days after starting treatment with complete clearing in 5-7 days.  Please make an appointment to be seen in a clinic or urgent care if any of the following occur:     Your symptoms have not improved in 3 days or not resolved in 10 days    You develop new symptoms or your symptoms become worse    If you think you may be at risk for an STD      Diagnosis: Candida vulvovaginitis  Diagnosis ICD: B37.3  Prescription: fluconazole (Diflucan) 150 mg oral tablet 1 tablet, 1 days supply. Take 1 tablet by mouth in a single dose. Refills: 0, Refill as needed: no, Allow substitutions: yes  Pharmacy: [facility] #[id number] - [phone] - [address] [place] [address]

## 2020-12-27 ENCOUNTER — HEALTH MAINTENANCE LETTER (OUTPATIENT)
Age: 40
End: 2020-12-27

## 2021-01-21 NOTE — PATIENT INSTRUCTIONS
Schedule mammogram  Anytime in the next year        Lifestyle recommendations:  Being overweight or obese puts you are risk of major health problems including but not limited to: heart disease/heart attack, stroke, high cholesterol, high blood pressure, and diabetes.  This is why it is important to be at a healthy weight for your height.     Exercise 30 minutes 3-5 times a week, if you can only do 10 minutes 3 times a week that is still shown to have great benefit!  Brisk walking even counts for this.  Consider free youtube videos for exercise that fits your needs and lifestyle.     Monitor your caffeine and soda intake, try to minimize these beverages    Drink plenty of water (about 70-80 ounces a day)    Try to eat a vegetable and fruit  with lunch and dinner.  Have a breakfast that contains protein such as eggs or oatmeal.  Decrease your white bread, pasta, and sweets intake.  Increase lean proteins like chicken or pork. Try to eat out 1-2 times a week or less.  Monitor your portion sizes, try using smaller plates if needed.  Eat slowly, this gives you time to be aware that your body is full.   Let me know at any time if you would like a referral to a nutritionist!            Preventive Health Recommendations  Female Ages 26 - 39  Yearly exam:   See your health care provider every year in order to    Review health changes.     Discuss preventive care.      Review your medicines if you your doctor has prescribed any.    Until age 30: Get a Pap test every three years (more often if you have had an abnormal result).    After age 30: Talk to your doctor about whether you should have a Pap test every 3 years or have a Pap test with HPV screening every 5 years.   You do not need a Pap test if your uterus was removed (hysterectomy) and you have not had cancer.  You should be tested each year for STDs (sexually transmitted diseases), if you're at risk.   Talk to your provider about how often to have your cholesterol  checked.  If you are at risk for diabetes, you should have a diabetes test (fasting glucose).  Shots: Get a flu shot each year. Get a tetanus shot every 10 years.   Nutrition:     Eat at least 5 servings of fruits and vegetables each day.    Eat whole-grain bread, whole-wheat pasta and brown rice instead of white grains and rice.    Get adequate Calcium and Vitamin D.     Lifestyle    Exercise at least 150 minutes a week (30 minutes a day, 5 days of the week). This will help you control your weight and prevent disease.    Limit alcohol to one drink per day.    No smoking.     Wear sunscreen to prevent skin cancer.    See your dentist every six months for an exam and cleaning.

## 2021-01-21 NOTE — PROGRESS NOTES
SUBJECTIVE:   CC: Shala Poole is an 40 year old woman who presents for preventive health visit.     Patient has been advised of split billing requirements and indicates understanding: Yes     New patient to me:    No longer needs paps. S/p hysterectomy.    Mammogram--will schedule.       Healthy Habits:    Pt is not fasting and did not have labs completed    Pulse- elevated, typically around  per patient.     bmi 39. Has lost a bit of weight from 2019. Wants to try keto diet. We did discuss other options today. She will let me know if she needs any referrals.     Not much caffeine.   No chest pain or shortness of breath or dizziness.         Answers for HPI/ROS submitted by the patient on 1/29/2021   Annual Exam:  Frequency of exercise:: None  Getting at least 3 servings of Calcium per day:: Yes  Diet:: Regular (no restrictions), Other  Medication side effects:: None  Bi-annual eye exam:: NO  Dental care twice a year:: NO  Sleep apnea or symptoms of sleep apnea:: Daytime drowsiness  abdominal pain: No  Blood in stool: No  Blood in urine: No  chest pain: No  chills: No  congestion: No  constipation: No  cough: No  diarrhea: No  dizziness: No  ear pain: No  eye pain: No  nervous/anxious: No  fever: No  frequency: No  genital sores: No  headaches: No  hearing loss: Yes  heartburn: No  arthralgias: No  joint swelling: No  peripheral edema: No  mood changes: No  myalgias: No  nausea: No  dysuria: No  palpitations: No  Skin sensation changes: No  sore throat: No  urgency: No  rash: No  shortness of breath: No  visual disturbance: No  weakness: No  pelvic pain: No  vaginal bleeding: No  vaginal discharge: No  tenderness: No  breast mass: No  breast discharge: No  Additional concerns today:: Yes, check thyroid labs per pt.  Mom has thyroid disease.   Patient has some hair loss, cold, tired.  Does snore.  No known apnea.       Depression-has been under control per patient and doing well. Denies suicidal or  homicidal thoughts.  Patient instructed to go to the emergency room or call 911 if these occur.  Goes to psychiatrist for this.             Today's PHQ-2 Score:   PHQ-2 ( 1999 Pfizer) 3/22/2019 1/16/2017   Q1: Little interest or pleasure in doing things 0 0   Q2: Feeling down, depressed or hopeless 0 0   PHQ-2 Score 0 0   Q1: Little interest or pleasure in doing things Not at all -   Q2: Feeling down, depressed or hopeless Not at all -   PHQ-2 Score 0 -       Abuse: Current or Past(Physical, Sexual or Emotional)- No  Do you feel safe in your environment? Yes        Social History     Tobacco Use     Smoking status: Never Smoker     Smokeless tobacco: Never Used   Substance Use Topics     Alcohol use: Yes     Comment: Some weekends a couple drinks socially     If you drink alcohol do you typically have >3 drinks per day or >7 drinks per week? No                     Reviewed orders with patient.  Reviewed health maintenance and updated orders accordingly - Yes      Mammogram Screening - Offered annual screening and updated Health Maintenance for mutual plan based on risk factor consideration      Pertinent mammograms are reviewed under the imaging tab.  History of abnormal Pap smear: NO - age 30- 65 PAP every 3 years recommended     Reviewed and updated as needed this visit by clinical staff                 Reviewed and updated as needed this visit by Provider                Past Medical History:   Diagnosis Date     Depressive disorder 2003      Past Surgical History:   Procedure Laterality Date     HYSTERECTOMY TOTAL ABDOMINAL       HYSTERECTOMY, PAP NO LONGER INDICATED  12/2014    due to excessive bleeding - ovaries remain     ORTHOPEDIC SURGERY      cyst removal from left wrist      OB History   No obstetric history on file.       ROS:  CONSTITUTIONAL: NEGATIVE for fever, chills, change in weight  INTEGUMENTARY/SKIN: NEGATIVE for worrisome rashes, moles or lesions  EYES: NEGATIVE for vision changes or  "irritation  ENT: NEGATIVE for ear, mouth and throat problems  RESP: NEGATIVE for significant cough or SOB  BREAST: NEGATIVE for masses, tenderness or discharge  CV: NEGATIVE for chest pain, palpitations or peripheral edema  GI: NEGATIVE for nausea, abdominal pain, heartburn, or change in bowel habits  : NEGATIVE for unusual urinary or vaginal symptoms. No vaginal bleeding.  MUSCULOSKELETAL: NEGATIVE for significant arthralgias or myalgia  NEURO: NEGATIVE for weakness, dizziness or paresthesias  PSYCHIATRIC: NEGATIVE for changes in mood or affect     OBJECTIVE:   /86   Pulse 99   Temp 98.8  F (37.1  C) (Tympanic)   Resp 16   Ht 1.753 m (5' 9\")   Wt 121.1 kg (267 lb)   SpO2 98%   Breastfeeding No   BMI 39.43 kg/m    EXAM:  GENERAL: alert, no distress and obese  EYES: Eyes grossly normal to inspection, PERRL and conjunctivae and sclerae normal  HENT: ear canals and TM's normal, nose and mouth without ulcers or lesions  NECK: no adenopathy, no asymmetry, masses, or scars and thyroid normal to palpation  RESP: lungs clear to auscultation - no rales, rhonchi or wheezes  BREAST: normal without masses, tenderness or nipple discharge and no palpable axillary masses or adenopathy  CV: regular rate and rhythm, normal S1 S2, no S3 or S4, no murmur, click or rub, no peripheral edema and peripheral pulses strong  ABDOMEN: soft, nontender, no hepatosplenomegaly, no masses and bowel sounds normal  MS: no gross musculoskeletal defects noted, no edema  SKIN: no suspicious lesions or rashes  NEURO: Normal strength and tone, mentation intact and speech normal  PSYCH: mentation appears normal, affect normal/bright    Diagnostic Test Results:  Labs reviewed in Epic    ASSESSMENT/PLAN:   1. Routine general medical examination at a health care facility    - TSH with free T4 reflex    2. Encounter for screening mammogram for malignant neoplasm of breast    - *MA Screening Digital Bilateral; Future    3. Screening for " "diabetes mellitus    - Hemoglobin A1c  - Basic metabolic panel    4. Screening, lipid    - Lipid panel reflex to direct LDL Non-fasting    5. Fatigue, unspecified type  If no hypothyroidism would get sleep study ordered and set up  I will f/uw with labs      6. Snoring        Patient has been advised of split billing requirements and indicates understanding: Yes  COUNSELING:   Reviewed preventive health counseling, as reflected in patient instructions       Regular exercise       Healthy diet/nutrition       Vision screening       Hearing screening    Estimated body mass index is 41.36 kg/m  as calculated from the following:    Height as of 3/25/19: 1.727 m (5' 8\").    Weight as of 3/25/19: 123.4 kg (272 lb).    Weight management plan: Discussed healthy diet and exercise guidelines    She reports that she has never smoked. She has never used smokeless tobacco.  Patient Instructions   Schedule mammogram  Anytime in the next year        Lifestyle recommendations:  Being overweight or obese puts you are risk of major health problems including but not limited to: heart disease/heart attack, stroke, high cholesterol, high blood pressure, and diabetes.  This is why it is important to be at a healthy weight for your height.     Exercise 30 minutes 3-5 times a week, if you can only do 10 minutes 3 times a week that is still shown to have great benefit!  Brisk walking even counts for this.  Consider free youtube videos for exercise that fits your needs and lifestyle.     Monitor your caffeine and soda intake, try to minimize these beverages    Drink plenty of water (about 70-80 ounces a day)    Try to eat a vegetable and fruit  with lunch and dinner.  Have a breakfast that contains protein such as eggs or oatmeal.  Decrease your white bread, pasta, and sweets intake.  Increase lean proteins like chicken or pork. Try to eat out 1-2 times a week or less.  Monitor your portion sizes, try using smaller plates if needed.  Eat " slowly, this gives you time to be aware that your body is full.   Let me know at any time if you would like a referral to a nutritionist!            Preventive Health Recommendations  Female Ages 26 - 39  Yearly exam:   See your health care provider every year in order to    Review health changes.     Discuss preventive care.      Review your medicines if you your doctor has prescribed any.    Until age 30: Get a Pap test every three years (more often if you have had an abnormal result).    After age 30: Talk to your doctor about whether you should have a Pap test every 3 years or have a Pap test with HPV screening every 5 years.   You do not need a Pap test if your uterus was removed (hysterectomy) and you have not had cancer.  You should be tested each year for STDs (sexually transmitted diseases), if you're at risk.   Talk to your provider about how often to have your cholesterol checked.  If you are at risk for diabetes, you should have a diabetes test (fasting glucose).  Shots: Get a flu shot each year. Get a tetanus shot every 10 years.   Nutrition:     Eat at least 5 servings of fruits and vegetables each day.    Eat whole-grain bread, whole-wheat pasta and brown rice instead of white grains and rice.    Get adequate Calcium and Vitamin D.     Lifestyle    Exercise at least 150 minutes a week (30 minutes a day, 5 days of the week). This will help you control your weight and prevent disease.    Limit alcohol to one drink per day.    No smoking.     Wear sunscreen to prevent skin cancer.    See your dentist every six months for an exam and cleaning.          Counseling Resources:  ATP IV Guidelines  Pooled Cohorts Equation Calculator  Breast Cancer Risk Calculator  BRCA-Related Cancer Risk Assessment: FHS-7 Tool  FRAX Risk Assessment  ICSI Preventive Guidelines  Dietary Guidelines for Americans, 2010  USDA's MyPlate  ASA Prophylaxis  Lung CA Screening    HERB Alvarado [facility]  [place]

## 2021-01-29 ASSESSMENT — ENCOUNTER SYMPTOMS
SORE THROAT: 0
ABDOMINAL PAIN: 0
MYALGIAS: 0
WEAKNESS: 0
CHILLS: 0
HEMATURIA: 0
NERVOUS/ANXIOUS: 0
EYE PAIN: 0
PARESTHESIAS: 0
FREQUENCY: 0
BREAST MASS: 0
NAUSEA: 0
ARTHRALGIAS: 0
FEVER: 0
SHORTNESS OF BREATH: 0
DIARRHEA: 0
PALPITATIONS: 0
COUGH: 0
HEMATOCHEZIA: 0
CONSTIPATION: 0
DYSURIA: 0
HEADACHES: 0
JOINT SWELLING: 0
DIZZINESS: 0
HEARTBURN: 0

## 2021-02-02 ENCOUNTER — OFFICE VISIT (OUTPATIENT)
Dept: FAMILY MEDICINE | Facility: CLINIC | Age: 41
End: 2021-02-02
Payer: COMMERCIAL

## 2021-02-02 VITALS
TEMPERATURE: 98.8 F | WEIGHT: 267 LBS | RESPIRATION RATE: 16 BRPM | OXYGEN SATURATION: 98 % | HEART RATE: 99 BPM | HEIGHT: 69 IN | SYSTOLIC BLOOD PRESSURE: 132 MMHG | DIASTOLIC BLOOD PRESSURE: 86 MMHG | BODY MASS INDEX: 39.55 KG/M2

## 2021-02-02 DIAGNOSIS — Z12.31 ENCOUNTER FOR SCREENING MAMMOGRAM FOR MALIGNANT NEOPLASM OF BREAST: ICD-10-CM

## 2021-02-02 DIAGNOSIS — R06.83 SNORING: ICD-10-CM

## 2021-02-02 DIAGNOSIS — Z97.4 HEARING AID WORN: ICD-10-CM

## 2021-02-02 DIAGNOSIS — R53.83 FATIGUE, UNSPECIFIED TYPE: ICD-10-CM

## 2021-02-02 DIAGNOSIS — Z13.1 SCREENING FOR DIABETES MELLITUS: ICD-10-CM

## 2021-02-02 DIAGNOSIS — Z00.00 ROUTINE GENERAL MEDICAL EXAMINATION AT A HEALTH CARE FACILITY: Primary | ICD-10-CM

## 2021-02-02 DIAGNOSIS — Z13.220 SCREENING, LIPID: ICD-10-CM

## 2021-02-02 PROBLEM — E66.01 MORBID OBESITY (H): Status: RESOLVED | Noted: 2018-07-31 | Resolved: 2021-02-02

## 2021-02-02 LAB — HBA1C MFR BLD: 4.8 % (ref 0–5.6)

## 2021-02-02 PROCEDURE — 99213 OFFICE O/P EST LOW 20 MIN: CPT | Mod: 25 | Performed by: PHYSICIAN ASSISTANT

## 2021-02-02 PROCEDURE — 80061 LIPID PANEL: CPT | Performed by: PHYSICIAN ASSISTANT

## 2021-02-02 PROCEDURE — 84443 ASSAY THYROID STIM HORMONE: CPT | Performed by: PHYSICIAN ASSISTANT

## 2021-02-02 PROCEDURE — 83036 HEMOGLOBIN GLYCOSYLATED A1C: CPT | Performed by: PHYSICIAN ASSISTANT

## 2021-02-02 PROCEDURE — 80048 BASIC METABOLIC PNL TOTAL CA: CPT | Performed by: PHYSICIAN ASSISTANT

## 2021-02-02 PROCEDURE — 36415 COLL VENOUS BLD VENIPUNCTURE: CPT | Performed by: PHYSICIAN ASSISTANT

## 2021-02-02 PROCEDURE — 99396 PREV VISIT EST AGE 40-64: CPT | Performed by: PHYSICIAN ASSISTANT

## 2021-02-02 ASSESSMENT — ANXIETY QUESTIONNAIRES
6. BECOMING EASILY ANNOYED OR IRRITABLE: SEVERAL DAYS
GAD7 TOTAL SCORE: 5
7. FEELING AFRAID AS IF SOMETHING AWFUL MIGHT HAPPEN: NOT AT ALL
1. FEELING NERVOUS, ANXIOUS, OR ON EDGE: SEVERAL DAYS
5. BEING SO RESTLESS THAT IT IS HARD TO SIT STILL: NOT AT ALL
IF YOU CHECKED OFF ANY PROBLEMS ON THIS QUESTIONNAIRE, HOW DIFFICULT HAVE THESE PROBLEMS MADE IT FOR YOU TO DO YOUR WORK, TAKE CARE OF THINGS AT HOME, OR GET ALONG WITH OTHER PEOPLE: NOT DIFFICULT AT ALL
3. WORRYING TOO MUCH ABOUT DIFFERENT THINGS: SEVERAL DAYS
2. NOT BEING ABLE TO STOP OR CONTROL WORRYING: SEVERAL DAYS

## 2021-02-02 ASSESSMENT — PATIENT HEALTH QUESTIONNAIRE - PHQ9
SUM OF ALL RESPONSES TO PHQ QUESTIONS 1-9: 2
5. POOR APPETITE OR OVEREATING: SEVERAL DAYS

## 2021-02-02 ASSESSMENT — MIFFLIN-ST. JEOR: SCORE: 1945.48

## 2021-02-03 LAB
ANION GAP SERPL CALCULATED.3IONS-SCNC: 4 MMOL/L (ref 3–14)
BUN SERPL-MCNC: 22 MG/DL (ref 7–30)
CALCIUM SERPL-MCNC: 10.1 MG/DL (ref 8.5–10.1)
CHLORIDE SERPL-SCNC: 104 MMOL/L (ref 94–109)
CHOLEST SERPL-MCNC: 205 MG/DL
CO2 SERPL-SCNC: 30 MMOL/L (ref 20–32)
CREAT SERPL-MCNC: 0.96 MG/DL (ref 0.52–1.04)
GFR SERPL CREATININE-BSD FRML MDRD: 74 ML/MIN/{1.73_M2}
GLUCOSE SERPL-MCNC: 85 MG/DL (ref 70–99)
HDLC SERPL-MCNC: 37 MG/DL
LDLC SERPL CALC-MCNC: 109 MG/DL
NONHDLC SERPL-MCNC: 168 MG/DL
POTASSIUM SERPL-SCNC: 3.7 MMOL/L (ref 3.4–5.3)
SODIUM SERPL-SCNC: 138 MMOL/L (ref 133–144)
TRIGL SERPL-MCNC: 296 MG/DL
TSH SERPL DL<=0.005 MIU/L-ACNC: 2.6 MU/L (ref 0.4–4)

## 2021-02-03 ASSESSMENT — ANXIETY QUESTIONNAIRES: GAD7 TOTAL SCORE: 5

## 2021-02-04 DIAGNOSIS — R06.83 SNORING: Primary | ICD-10-CM

## 2021-02-04 NOTE — RESULT ENCOUNTER NOTE
Oumar Last,       Your recent test results are attached, if you have any questions or concerns please feel free to contact me via e-mail or call [phone].a1c shows no diabetes. Sodium and potassium normal. Blood sugar (glucose) normal.  Creatinine and GFR normal, which means kidney function is normal.   Cholesterol Is high-working on weight loss will help this.   Thyroid to goal. Lets have you schedule sleep study.       Here is that info:   Order: Sleep Evaluation & Management Referral - [facility] [phone] (Age 15 And Up)           It was a pleasure to see you at your recent office visit.      Sincerely,  Sasha Peoples PA-C

## 2021-02-08 ENCOUNTER — ALLIED HEALTH/NURSE VISIT (OUTPATIENT)
Dept: AUDIOLOGY | Facility: CLINIC | Age: 41
End: 2021-02-08

## 2021-02-08 DIAGNOSIS — H90.3 SENSORINEURAL HEARING LOSS, BILATERAL: Primary | ICD-10-CM

## 2021-02-08 PROCEDURE — 99207 PR NO CHARGE LOS: CPT | Performed by: AUDIOLOGIST

## 2021-02-08 PROCEDURE — V5014 HEARING AID REPAIR/MODIFYING: HCPCS | Performed by: AUDIOLOGIST

## 2021-02-08 NOTE — PROGRESS NOTES
HEARING AID RECHECK    Patient Name:  Shala Poole    Patient Age:   41 year old    :  1980    Background:   Patient dropped off her Resound hearing aid for  replacement as the wire was broken    SIDE: Left     : Resound     TYPE:  DRALBER     S/N: [id number]    WARRANTY: N/A    Procedures:   Listening and visual check revealed broken  wire.  I replaced with a #2Left HP  and a repeat listening check was completed to verify good gain and sound quality.  Patient authorized the $100 charge via [employer].    Plan:   Return for service as needed    NO CHARGE VISIT:  [id number] Out  of warranty  charge, $100.00    Gunnar Guillory MA, CCC-A  [place] Licensed Audiologist #[id number]  2021

## 2021-03-02 PROBLEM — E66.812 CLASS 2 OBESITY DUE TO EXCESS CALORIES WITHOUT SERIOUS COMORBIDITY WITH BODY MASS INDEX (BMI) OF 39.0 TO 39.9 IN ADULT: Chronic | Status: ACTIVE | Noted: 2018-07-31

## 2021-03-02 PROBLEM — F32.0 MAJOR DEPRESSIVE DISORDER, SINGLE EPISODE, MILD (H): Chronic | Status: ACTIVE | Noted: 2017-01-16

## 2021-03-02 PROBLEM — E66.09 CLASS 2 OBESITY DUE TO EXCESS CALORIES WITHOUT SERIOUS COMORBIDITY WITH BODY MASS INDEX (BMI) OF 39.0 TO 39.9 IN ADULT: Status: ACTIVE | Noted: 2018-07-31

## 2021-03-02 PROBLEM — E66.09 CLASS 2 OBESITY DUE TO EXCESS CALORIES WITHOUT SERIOUS COMORBIDITY WITH BODY MASS INDEX (BMI) OF 39.0 TO 39.9 IN ADULT: Chronic | Status: ACTIVE | Noted: 2018-07-31

## 2021-03-02 PROBLEM — Z13.6 CARDIOVASCULAR SCREENING; LDL GOAL LESS THAN 160: Chronic | Status: ACTIVE | Noted: 2017-01-16

## 2021-03-02 PROBLEM — E66.812 CLASS 2 OBESITY DUE TO EXCESS CALORIES WITHOUT SERIOUS COMORBIDITY WITH BODY MASS INDEX (BMI) OF 39.0 TO 39.9 IN ADULT: Status: ACTIVE | Noted: 2018-07-31

## 2021-03-03 ENCOUNTER — VIRTUAL VISIT (OUTPATIENT)
Dept: SLEEP MEDICINE | Facility: CLINIC | Age: 41
End: 2021-03-03
Attending: PHYSICIAN ASSISTANT
Payer: COMMERCIAL

## 2021-03-03 VITALS — BODY MASS INDEX: 39.55 KG/M2 | HEIGHT: 69 IN | WEIGHT: 267 LBS

## 2021-03-03 DIAGNOSIS — R53.81 MALAISE AND FATIGUE: ICD-10-CM

## 2021-03-03 DIAGNOSIS — F51.04 CHRONIC INSOMNIA: ICD-10-CM

## 2021-03-03 DIAGNOSIS — E66.09 CLASS 2 OBESITY DUE TO EXCESS CALORIES WITHOUT SERIOUS COMORBIDITY WITH BODY MASS INDEX (BMI) OF 39.0 TO 39.9 IN ADULT: Chronic | ICD-10-CM

## 2021-03-03 DIAGNOSIS — R06.83 SNORING: Primary | ICD-10-CM

## 2021-03-03 DIAGNOSIS — R53.83 MALAISE AND FATIGUE: ICD-10-CM

## 2021-03-03 DIAGNOSIS — E66.812 CLASS 2 OBESITY DUE TO EXCESS CALORIES WITHOUT SERIOUS COMORBIDITY WITH BODY MASS INDEX (BMI) OF 39.0 TO 39.9 IN ADULT: Chronic | ICD-10-CM

## 2021-03-03 PROCEDURE — 99203 OFFICE O/P NEW LOW 30 MIN: CPT | Mod: 95 | Performed by: INTERNAL MEDICINE

## 2021-03-03 SDOH — ECONOMIC STABILITY: FOOD INSECURITY: WITHIN THE PAST 12 MONTHS, YOU WORRIED THAT YOUR FOOD WOULD RUN OUT BEFORE YOU GOT MONEY TO BUY MORE.: NOT ASKED

## 2021-03-03 SDOH — ECONOMIC STABILITY: TRANSPORTATION INSECURITY
IN THE PAST 12 MONTHS, HAS THE LACK OF TRANSPORTATION KEPT YOU FROM MEDICAL APPOINTMENTS OR FROM GETTING MEDICATIONS?: NOT ASKED

## 2021-03-03 SDOH — ECONOMIC STABILITY: INCOME INSECURITY: HOW HARD IS IT FOR YOU TO PAY FOR THE VERY BASICS LIKE FOOD, HOUSING, MEDICAL CARE, AND HEATING?: NOT ASKED

## 2021-03-03 SDOH — ECONOMIC STABILITY: FOOD INSECURITY: WITHIN THE PAST 12 MONTHS, THE FOOD YOU BOUGHT JUST DIDN'T LAST AND YOU DIDN'T HAVE MONEY TO GET MORE.: NOT ASKED

## 2021-03-03 SDOH — ECONOMIC STABILITY: TRANSPORTATION INSECURITY
IN THE PAST 12 MONTHS, HAS LACK OF TRANSPORTATION KEPT YOU FROM MEETINGS, WORK, OR FROM GETTING THINGS NEEDED FOR DAILY LIVING?: NOT ASKED

## 2021-03-03 ASSESSMENT — MIFFLIN-ST. JEOR: SCORE: 1945.48

## 2021-03-03 NOTE — PATIENT INSTRUCTIONS
Your BMI is Body mass index is 39.43 kg/m .  Weight management is a personal decision.  If you are interested in exploring weight loss strategies, the following discussion covers the approaches that may be successful. Body mass index (BMI) is one way to tell whether you are at a healthy weight, overweight, or obese. It measures your weight in relation to your height.  A BMI of 18.5 to 24.9 is in the healthy range. A person with a BMI of 25 to 29.9 is considered overweight, and someone with a BMI of 30 or greater is considered obese. More than two-thirds of American adults are considered overweight or obese.  Being overweight or obese increases the risk for further weight gain. Excess weight may lead to heart disease and diabetes.  Creating and following plans for healthy eating and physical activity may help you improve your health.  Weight control is part of healthy lifestyle and includes exercise, emotional health, and healthy eating habits. Careful eating habits lifelong are the mainstay of weight control. Though there are significant health benefits from weight loss, long-term weight loss with diet alone may be very difficult to achieve- studies show long-term success with dietary management in less than 10% of people. Attaining a healthy weight may be especially difficult to achieve in those with severe obesity. In some cases, medications, devices and surgical management might be considered.  What can you do?  If you are overweight or obese and are interested in methods for weight loss, you should discuss this with your provider.     Consider reducing daily calorie intake by 500 calories.     Keep a food journal.     Avoiding skipping meals, consider cutting portions instead.    Diet combined with exercise helps maintain muscle while optimizing fat loss. Strength training is particularly important for building and maintaining muscle mass. Exercise helps reduce stress, increase energy, and improves fitness.  Increasing exercise without diet control, however, may not burn enough calories to loose weight.       Start walking three days a week 10-20 minutes at a time    Work towards walking thirty minutes five days a week     Eventually, increase the speed of your walking for 1-2 minutes at time    In addition, we recommend that you review healthy lifestyles and methods for weight loss available through the National Institutes of Health patient information sites:  http://win.niddk.nih.gov/publications/index.htm    And look into health and wellness programs that may be available through your health insurance provider, employer, local community center, or esther club.    Weight management plan: Patient was referred to their PCP to discuss a diet and exercise plan.  Read the book Say Good Night To Insomnia

## 2021-03-03 NOTE — PROGRESS NOTES
Shala is a 41 year old who is being evaluated via a billable video visit.      How would you like to obtain your AVS? MyChart  If the video visit is dropped, the invitation should be resent by: Text to cell phone: [phone]  Will anyone else be joining your video visit? No   Radha Perea CMA          Video Start Time: 1:53 PM     Video-Visit Details    Type of service:  Video Visit    Video End Time:2:23 PM    Originating Location (pt. Location): car    Distant Location (provider location):  [facility]     Platform used for Video Visit: Property Partner       Sleep Consultation:    Date on this visit: 3/3/2021    Shala Poole is sent by Sasha Peoples for a sleep consultation regarding snoring.    Primary Physician: Kehr, Kristen M     Chief Complaint   Patient presents with     Sleep Problem     Referred for fatigue, unrefreshed in morning        Shala goes to bed at 10:00 PM during the week. She gets up at 6:30 AM with an alarm.   Shala has difficulty falling asleep because of 'worry' 2 nights a week taking up to 60 minutes. This has been a problem for as long as she can remember. She wakes up >2 times a night without difficulty falling back to sleep.      On weekends, Shala goes to sleep at 10:00 PM.  She wakes up at 8:00 AM without an alarm.     Patient does not use electronics in bed and watch TV in bed.     Shala does not do shift work.  She works day shifts.      Shala does snore snoring is not that loud. Patient does have a regular bed partner. There is not report of kicking, punching, gasping, choking and snorting.  She does not have witnessed apneas. They never sleep separately.  Patient sleeps on her side < stomach. She has occasional morning headaches (2/week), denies no restless legs.     Shala denies any sleep walking, sleep talking and dream enactment.    Shala denies reflux at night.      Patient's Oklee Sleepiness score 7/24 inconsistent  with severe daytime sleepiness.  She has fatigue    Shala naps rarely. She takes no inadvertant naps.  She denies dozing while driving. She uses 1 sodas/day.       Recent Labs   Lab Test 02/02/21  1724 04/13/19  0917     --    POTASSIUM 3.7  --    CHLORIDE 104  --    CO2 30  --    ANIONGAP 4  --    GLC 85 94   BUN 22  --    CR 0.96  --    DEBI 10.1  --      Lab Results   Component Value Date    TSH 2.60 02/02/2021     CBC RESULTS: No results for input(s): WBC, RBC, HGB, HCT, MCV, MCH, MCHC, RDW, PLT in the last 61553 hours.     Allergies:    No Known Allergies    Medications:    Current Outpatient Medications   Medication Sig Dispense Refill     acetaminophen (TYLENOL) 325 MG tablet Take 325-650 mg by mouth       FLUoxetine (PROZAC) 20 MG capsule TK 1 C PO D  1       Problem List:  Patient Active Problem List    Diagnosis Date Noted     Major depressive disorder, single episode, mild (H) 01/16/2017     Priority: Medium     Fatigue, unspecified type 02/02/2021     Priority: Low     Hearing aid worn 02/02/2021     Priority: Low     Class 2 obesity due to excess calories without serious comorbidity with body mass index (BMI) of 39.0 to 39.9 in adult 07/31/2018     Priority: Low     CARDIOVASCULAR SCREENING; LDL GOAL LESS THAN 160 01/16/2017     Priority: Low     Sensorineural hearing loss (SNHL) of both ears 10/16/2012     Priority: Low     since a child. Mother has it too.          Past Medical/Surgical History:  Past Medical History:   Diagnosis Date     Depressive disorder      Past Surgical History:   Procedure Laterality Date     DENTAL SURGERY  1999    wisdom teeth     HYSTERECTOMY, PAP NO LONGER INDICATED  12/18/2013    due to excessive bleeding - ovaries remain     ORTHOPEDIC SURGERY  2012    cyst removal from left wrist      SALPINGECTOMY  12/18/2013    ROBOTIC ASSISTED BILATERAL SALPINGECTOMY     TUBAL LIGATION  2007       Social History:  Social History     Socioeconomic History     Marital status:       Spouse name: Not on file     Number of children: Not on file     Years of education: Not on file     Highest education level: Not on file   Occupational History     Occupation:  for [employer]   Social Needs     Financial resource strain: Not on file     Food insecurity     Worry: Not on file     Inability: Not on file     Transportation needs     Medical: Not on file     Non-medical: Not on file   Tobacco Use     Smoking status: Never Smoker     Smokeless tobacco: Never Used   Substance and Sexual Activity     Alcohol use: Yes     Comment: Some weekends a couple drinks socially     Drug use: No     Sexual activity: Yes     Partners: Male     Birth control/protection: Female Surgical   Lifestyle     Physical activity     Days per week: Not on file     Minutes per session: Not on file     Stress: Not on file   Relationships     Social connections     Talks on phone: Not on file     Gets together: Not on file     Attends Caodaism service: Not on file     Active member of club or organization: Not on file     Attends meetings of clubs or organizations: Not on file     Relationship status: Not on file     Intimate partner violence     Fear of current or ex partner: Not on file     Emotionally abused: Not on file     Physically abused: Not on file     Forced sexual activity: Not on file   Other Topics Concern     Parent/sibling w/ CABG, MI or angioplasty before 65F 55M? No   Social History Narrative     Not on file       Family History:  Family History   Problem Relation Age of Onset     Other - See Comments Mother         Fibromyalga.      Thyroid Disease Mother      Depression Mother      Diabetes Father      Chronic Obstructive Pulmonary Disease Father      Asthma Father      Heart Failure Father      Obesity Father      Diabetes Paternal Grandmother      Colon Cancer Other        Review of Systems:  A complete review of systems reviewed by me is negative with the exeption of what has  "been mentioned in the history of present illness.  CONSTITUTIONAL:  POSITIVE for recent night sweats  EYES:  NEGATIVE for double vision  ENT:  NEGATIVE for  sore throat  CARDIAC:  NEGATIVE for  fast heart beats and fluttering in chest  NEUROLOGIC:  POSITIVE for  headaches  DERMATOLOGIC:  NEGATIVE for  rashes  PULMONARY:  NEGATIVE for  SOB with activity  GASTROINTESTINAL:  NEGATIVE for  loose or watery stools and constipation  GENITOURINARY:  NEGATIVE for  urinating more frequently than usual  MUSCULOSKELETAL:  NEGATIVE for  bone or joint pain  ENDOCRINE:  NEGATIVE for  increased thirst  LYMPHATIC:  NEGATIVE for  swollen lymph nodes    Physical Examination:  Vitals: Ht 1.753 m (5' 9\")   Wt 121.1 kg (267 lb)   BMI 39.43 kg/m    BMI= Body mass index is 39.43 kg/m .    BENJAMIN 8    Catlin Total Score 3/1/2021   Total score - Catlin 7          SpO2 Readings from Last 4 Encounters:   02/02/21 98%   03/25/19 98%   07/30/18 100%   03/19/18 99%       GENERAL APPEARANCE: alert and no distress  EYES: Eyes grossly normal to inspection  HENT: mouth without ulcers or lesions  NECK: generous size  LUNGS: no shortness of breath , cough  NEURO: mentation intact, speech normal and cranial nerves 2-12 appear intact  PSYCH: affect normal/bright  Mallampati Class: 3      Impression/Plan:    Fatigue (ESS 7), snoring, occasional morning headaches (2/week), recent night sweats, obesity, crowded oropharynx. STOPBANG 1-3.   - Recommend Polysomnogram (using 4% desaturation/Medicare/ AASM 1B scoring rules) for possible  obstructive sleep apnea.  Patient is a poor candidate for Home Sleep Testing due to symptoms of ARIANNE but low pre-test probability of ARIANNE.     Sleep onset difficulties   Suspect psychophysiologic insomnia. We discussed sleep restriction and stimulus control.   - Read the book Say Good Night To Insomnia      She will follow up with me in approximately two weeks after her sleep study has been competed to review the results and " discuss plan of care.       Polysomnography reviewed.  Obstructive sleep apnea reviewed.  Complications of untreated sleep apnea were reviewed.    I spent 30 minutes with patient including counseling, and 10 minutes with chart review, and documentation     CC: Sasha Peoples

## 2021-03-29 ENCOUNTER — ANCILLARY PROCEDURE (OUTPATIENT)
Dept: GENERAL RADIOLOGY | Facility: CLINIC | Age: 41
End: 2021-03-29
Attending: PHYSICIAN ASSISTANT
Payer: COMMERCIAL

## 2021-03-29 ENCOUNTER — OFFICE VISIT (OUTPATIENT)
Dept: FAMILY MEDICINE | Facility: CLINIC | Age: 41
End: 2021-03-29
Payer: COMMERCIAL

## 2021-03-29 VITALS
TEMPERATURE: 98.1 F | BODY MASS INDEX: 40.47 KG/M2 | DIASTOLIC BLOOD PRESSURE: 77 MMHG | HEIGHT: 68 IN | HEART RATE: 94 BPM | SYSTOLIC BLOOD PRESSURE: 115 MMHG | RESPIRATION RATE: 16 BRPM | OXYGEN SATURATION: 98 % | WEIGHT: 267 LBS

## 2021-03-29 DIAGNOSIS — M79.671 RIGHT FOOT PAIN: Primary | ICD-10-CM

## 2021-03-29 DIAGNOSIS — M25.571 PAIN IN JOINT INVOLVING ANKLE AND FOOT, RIGHT: ICD-10-CM

## 2021-03-29 DIAGNOSIS — E66.01 MORBID OBESITY (H): ICD-10-CM

## 2021-03-29 PROCEDURE — 99214 OFFICE O/P EST MOD 30 MIN: CPT | Performed by: PHYSICIAN ASSISTANT

## 2021-03-29 PROCEDURE — 73630 X-RAY EXAM OF FOOT: CPT | Mod: RT | Performed by: RADIOLOGY

## 2021-03-29 PROCEDURE — 73610 X-RAY EXAM OF ANKLE: CPT | Mod: RT | Performed by: RADIOLOGY

## 2021-03-29 ASSESSMENT — MIFFLIN-ST. JEOR: SCORE: 1929.6
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
## 2021-04-10 DIAGNOSIS — Z12.31 ENCOUNTER FOR SCREENING MAMMOGRAM FOR MALIGNANT NEOPLASM OF BREAST: ICD-10-CM

## 2021-04-10 PROCEDURE — 77067 SCR MAMMO BI INCL CAD: CPT | Mod: TC | Performed by: RADIOLOGY

## 2021-04-10 PROCEDURE — 77063 BREAST TOMOSYNTHESIS BI: CPT | Mod: TC | Performed by: RADIOLOGY

## 2021-04-13 ENCOUNTER — TELEPHONE (OUTPATIENT)
Dept: SLEEP MEDICINE | Facility: CLINIC | Age: 41
End: 2021-04-13

## 2021-04-13 NOTE — TELEPHONE ENCOUNTER
Message left for patient to call and schedule sleep study and follow up.     Brandie Paul CMA on 4/13/2021 at 3:42 PM

## 2021-09-02 ENCOUNTER — TELEPHONE (OUTPATIENT)
Dept: CARE COORDINATION | Facility: CLINIC | Age: 41
End: 2021-09-02

## 2021-09-02 ENCOUNTER — LAB (OUTPATIENT)
Dept: URGENT CARE | Facility: URGENT CARE | Age: 41
End: 2021-09-02
Attending: EMERGENCY MEDICINE
Payer: COMMERCIAL

## 2021-09-02 ENCOUNTER — E-VISIT (OUTPATIENT)
Dept: URGENT CARE | Facility: CLINIC | Age: 41
End: 2021-09-02
Payer: COMMERCIAL

## 2021-09-02 DIAGNOSIS — Z20.822 SUSPECTED COVID-19 VIRUS INFECTION: Primary | ICD-10-CM

## 2021-09-02 DIAGNOSIS — Z20.822 SUSPECTED COVID-19 VIRUS INFECTION: ICD-10-CM

## 2021-09-02 PROCEDURE — 99421 OL DIG E/M SVC 5-10 MIN: CPT | Performed by: EMERGENCY MEDICINE

## 2021-09-02 PROCEDURE — U0003 INFECTIOUS AGENT DETECTION BY NUCLEIC ACID (DNA OR RNA); SEVERE ACUTE RESPIRATORY SYNDROME CORONAVIRUS 2 (SARS-COV-2) (CORONAVIRUS DISEASE [COVID-19]), AMPLIFIED PROBE TECHNIQUE, MAKING USE OF HIGH THROUGHPUT TECHNOLOGIES AS DESCRIBED BY CMS-2020-01-R: HCPCS

## 2021-09-02 PROCEDURE — U0005 INFEC AGEN DETEC AMPLI PROBE: HCPCS

## 2021-09-02 NOTE — PATIENT INSTRUCTIONS
Dear Shala Poole,    Your symptoms show that you may have coronavirus (COVID-19). This illness can cause fever, cough and trouble breathing. Many people get a mild case and get better on their own. Some people can get very sick.    Will I be tested for COVID-19?  We would like to test you for Covid-19 virus. I have placed orders for this test.     To schedule: go to your Insights home page and scroll down to the section that says  You have an appointment that needs to be scheduled  and click the large green button that says  Schedule Now  and follow the steps to find the next available openings.    If you are unable to complete these Insights scheduling steps, please call [phone] to schedule your testing.     Return to work/school/ guidance:  Please let your workplace manager and staffing office know when your quarantine ends     We can t give you an exact date as it depends on the above. You can calculate this on your own or work with your manager/staffing office to calculate this. (For example if you were exposed on 10/4, you would have to quarantine for 14 full days. That would be through 10/18. You could return on 10/19.)      If you receive a positive COVID-19 test result, follow the guidance of the those who are giving you the results. Usually the return to work is 10 (or in some cases 20 days from symptom onset.) If you work at [facility], you must also be cleared by Employee Occupational Health and Safety to return to work.        If you receive a negative COVID-19 test result and did not have a high risk exposure to someone with a known positive COVID-19 test, you can return to work once you're free of fever for 24 hours without fever-reducing medication and your symptoms are improving or resolved.      If you receive a negative COVID-19 test and If you had a high risk exposure to someone who has tested positive for COVID-19 then you can return to work 14 days after your last  contact with the positive individual    Note: If you have ongoing exposure to the covid positive person, this quarantine period may be more than 14 days. (For example, if you are continued to be exposed to your child who tested positive and cannot isolate from them, then the quarantine of 7-14 days can't start until your child is no longer contagious. This is typically 10 days from onset of the child's symptoms. So the total duration may be 17-24 days in this case.)    Sign up for [employer].   We know it's scary to hear that you might have COVID-19. We want to track your symptoms to make sure you're okay over the next 2 weeks. Please look for an email from [employer]--this is a free, online program that we'll use to keep in touch. To sign up, follow the link in the email you will receive. Learn more at [email or web address]    How can I take care of myself?    Get lots of rest. Drink extra fluids (unless a doctor has told you not to)    Take Tylenol (acetaminophen) or ibuprofen for fever or pain. If you have liver or kidney problems, ask your family doctor if it's okay to take Tylenol o ibuprofen    If you have other health problems (like cancer, heart failure, an organ transplant or severe kidney disease): Call your specialty clinic if you don't feel better in the next 2 days.    Know when to call 911. Emergency warning signs include:  o Trouble breathing or shortness of breath  o Pain or pressure in the chest that doesn't go away  o Feeling confused like you haven't felt before, or not being able to wake up  o Bluish-colored lips or face    Where can I get more information?  [facility] - About COVID-19:   [email or web address]    CDC - What to Do If You're Sick:   www.cdc.gov/coronavirus/2019-ncov/about/steps-when-sick.html    September 2, 2021  RE:  Shala Poole                                                                                                                   [id number] [facility]  [place] [address]      To whom it may concern:    I evaluated Shala Poole on September 2, 2021. Shala Poole should be excused from work/school.     They should let their workplace manager and staffing office know when their quarantine ends.    We can not give an exact date as it depends on the information below. They can calculate this on their own or work with their manager/staffing office to calculate this. (For example if they were exposed on 10/04, they would have to quarantine for 14 full days. That would be through 10/18. They could return on 10/19.)    Quarantine Guidelines:      If patient receives a positive COVID-19 test result, they should follow the guidance of those who are giving the results. Usually the return to work is 10 (or in some cases 20 days from symptom onset.) If they work at [employer], they must be cleared by Employee Occupational Health and Safety to return to work.        If patient receives a negative COVID-19 test result and did not have a high risk exposure to someone with a known positive COVID-19 test, they can return to work once they're free of fever for 24 hours without fever-reducing medication and their symptoms are improving or resolved.      If patient receives a negative COVID-19 test and if they had a high risk exposure to someone who has tested positive for COVID-19 then they can return to work 14 days after their last contact with the positive individual    Note: If there is ongoing exposure to the covid positive person, this quarantine period may be longer than 14 days. (For example, if they are continually exposed to their child, who tested positive and cannot isolate from them, then the quarantine of 7-14 days can't start until their child is no longer contagious. This is typically 10 days from onset to the child's symptoms. So the total duration may be 17-24 days in this case.)     Sincerely,  Fransico Davenport MD

## 2021-09-02 NOTE — TELEPHONE ENCOUNTER
"Pt on COVID-19 GetWell Loop reporting red flag symptoms of worsening dyspnea; reported sats 99%.  Today is the first daily check-in pt has completed.  Call made to assess.    Pt states she's \"ok, tired,\" but otherwise alright.  States her breathing \"feels ok\" but more noticeable when going up/down stairs,\" however, she's able to complete a set of stairs without having to stop to catch her breath.  Confirms she has an oximeter at home and understands how to use it, aware the highest reading she can get for her sats is 100%; last reading was 99%.  She denies any SOB at rest or other activity aside from the stairs at this time.  She reports she just completed her COVID-19 test and on her way back home.        RN reviewed isolation/quarantine precautions with pt.  Encouraged rest and pushing fluids.  Reviewed deep breathing exercises with pt, instructed to do 5-10 deep breaths every 1-2 hours while awake to help prevent pneumonia.  Continue monitoring O2 sats, RN confirmed pt understands how to use and read the oximeter.  RN will continue monitoring GWL and follow up with pt as needed.      Shiela Rivera RN Clinic Care Coordination & GetWell Loop     "

## 2021-09-03 LAB — SARS-COV-2 RNA RESP QL NAA+PROBE: POSITIVE

## 2021-10-09 ENCOUNTER — HEALTH MAINTENANCE LETTER (OUTPATIENT)
Age: 41
End: 2021-10-09

## 2021-10-13 ENCOUNTER — MYC MEDICAL ADVICE (OUTPATIENT)
Dept: AUDIOLOGY | Facility: CLINIC | Age: 41
End: 2021-10-13
Payer: COMMERCIAL

## 2021-10-13 DIAGNOSIS — H90.3 SENSORINEURAL HEARING LOSS, BILATERAL: Primary | ICD-10-CM

## 2021-10-13 PROCEDURE — V5266 BATTERY FOR HEARING DEVICE: HCPCS | Performed by: AUDIOLOGIST

## 2021-10-13 PROCEDURE — 99207 PR NO CHARGE LOS: CPT | Performed by: AUDIOLOGIST

## 2021-10-18 NOTE — TELEPHONE ENCOUNTER
Per patient request, I left batteries at the 2nd floor reception desk in [place]. I sent a [employer] message alerting her that she may  pick them up.    Charges:    Batteries:  Size 312, $30 bill to  insurance    Gunnar Guillory MA, CCC-A  [place] Licensed Audiologist #[id number]  [facility]        10/18/2021

## 2021-10-19 ENCOUNTER — TELEPHONE (OUTPATIENT)
Dept: AUDIOLOGY | Facility: CLINIC | Age: 41
End: 2021-10-19

## 2021-10-19 ENCOUNTER — ALLIED HEALTH/NURSE VISIT (OUTPATIENT)
Dept: AUDIOLOGY | Facility: CLINIC | Age: 41
End: 2021-10-19
Payer: COMMERCIAL

## 2021-10-19 DIAGNOSIS — H90.3 SENSORINEURAL HEARING LOSS, BILATERAL: Primary | ICD-10-CM

## 2021-10-19 PROCEDURE — V5299 HEARING SERVICE: HCPCS | Performed by: AUDIOLOGIST

## 2021-10-19 PROCEDURE — 99207 PR NO CHARGE LOS: CPT | Performed by: AUDIOLOGIST

## 2021-10-19 NOTE — PROGRESS NOTES
HEARING AID DROP-OFF    Patient Name:  Shala Poole    Patient Age:   41 year old    :  1980    Background:   The patient dropped off her left hearing aid because the earmold is not staying on the .     SIDE: Left   MAKE: ReSound   MODEL:  DR   S/N: [id number]   WARRANTY:     Procedures:   A biological listening check revealed good sound from the hearing aid, which was briefly cleaned and checked. I attempted to fit the earmold on the  but it did not stay on. There did not appear to be anything inside the earmold to hold onto the , and there likely was a piece that broke down or fell out. The  no longer has the scan on file (earmold SN: [id number]), so a new impression will need to be taken so a new earmold can be ordered.    Plan:   The patient was sent a [employer] message informing her that she will need an appointment to have an impression taken for a new left earmold.      Neal Weston, CCC-A  Licensed Audiologist  10/19/2021

## 2021-11-08 ENCOUNTER — OFFICE VISIT (OUTPATIENT)
Dept: AUDIOLOGY | Facility: CLINIC | Age: 41
End: 2021-11-08
Payer: COMMERCIAL

## 2021-11-08 DIAGNOSIS — H90.3 SENSORINEURAL HEARING LOSS, BILATERAL: Primary | ICD-10-CM

## 2021-11-08 PROCEDURE — 99207 PR NO CHARGE LOS: CPT | Performed by: AUDIOLOGIST

## 2021-11-08 PROCEDURE — V5275 EAR IMPRESSION: HCPCS | Mod: LT | Performed by: AUDIOLOGIST

## 2021-11-08 NOTE — PROGRESS NOTES
AUDIOLOGY REPORT: HEARING AID RECHECK    SUBJECTIVE: Shala Poole is a 41 year old female, :  1980, was seen in the Audiology Clinic at [facility] on 21 for a return check of their hearing aids. Patient was unaccompanied to today's visit.       Background:   Patient is here today to have a new impression taken of the left ear to have her earmold remade as it was no longer on file at [facility].     Procedures:       SIDE: Left    : ReSound    TYPE: Linx 2 5 RITE    S/N: [id number]    WARRANTY:      Today we took a new left earmold impression without complication. The left ear hearing aid was returned to patient with a power dome in place of the earmold to help Shala hear while she waits for the new earmold fitting.     Plan:   Patient will return in 2-3 weeks for an earmold fitting.     CHARGES:    Impression    [place] CCC-GHADA  Licensed Audiologist #[id number]  2021

## 2021-12-06 ENCOUNTER — OFFICE VISIT (OUTPATIENT)
Dept: AUDIOLOGY | Facility: CLINIC | Age: 41
End: 2021-12-06
Payer: COMMERCIAL

## 2021-12-06 DIAGNOSIS — H90.3 SENSORINEURAL HEARING LOSS, BILATERAL: Primary | ICD-10-CM

## 2021-12-06 PROCEDURE — 99207 PR NO CHARGE LOS: CPT | Performed by: AUDIOLOGIST

## 2021-12-06 PROCEDURE — V5264 EAR MOLD/INSERT: HCPCS | Mod: LT | Performed by: AUDIOLOGIST

## 2021-12-06 NOTE — PROGRESS NOTES
AUDIOLOGY REPORT    SUBJECTIVE: Shala Poole is a 41 year old female was seen in the Audiology Clinic at  [facility] on 12/06/21 to be fit with new earmold. Patient was unaccompanied to today's visit.     OBJECTIVE:    Otoscopy revealed ears are clear of cerumen bilaterally.     New earmold was fit for the left ear. The earmold fit comfortably and securely. Patient reports good physical fit and comfort with the new earmold. Patient demonstrated ease of insertion and removal of the new earmold. SN: [id number]    ASSESSMENT:   Bilateral sensorineural hearing loss      Reviewed warranty information with patient. The 90 day remake period was explained to patient 5/15/2022.    PLAN: Shala will evaluate the new earmold and return if further adjustments are needed. Please contact this clinic with any questions or concerns.      [place] CCC-A  Licensed Audiologist #[id number]  12/6/2021

## 2022-03-20 ENCOUNTER — HEALTH MAINTENANCE LETTER (OUTPATIENT)
Age: 42
End: 2022-03-20

## 2022-04-04 ASSESSMENT — ENCOUNTER SYMPTOMS
HEARTBURN: 0
HEADACHES: 0
DYSURIA: 0
BREAST MASS: 0
NAUSEA: 0
MYALGIAS: 1
PARESTHESIAS: 0
NERVOUS/ANXIOUS: 0
FEVER: 0
SORE THROAT: 0
COUGH: 0
HEMATURIA: 0
EYE PAIN: 0
FREQUENCY: 0
SHORTNESS OF BREATH: 0
DIARRHEA: 0
DIZZINESS: 0
CHILLS: 0
HEMATOCHEZIA: 0
WEAKNESS: 0
ARTHRALGIAS: 0
PALPITATIONS: 0
JOINT SWELLING: 0
ABDOMINAL PAIN: 0
CONSTIPATION: 0

## 2022-04-04 NOTE — PROGRESS NOTES
SUBJECTIVE:   CC: Shala Poole is an 42 year old woman who presents for preventive health visit.     Patient has been advised of split billing requirements and indicates understanding: Yes     Pt is not fasting and did not have labs completed.    Has mammogram scheduled.     Depression-Denies suicidal or homicidal thoughts.  Patient instructed to go to the emergency room or call 911 if these occur.  Doing well.   Was seeing psych, stable for 3 years on prozac 60 mg, would like to return here. Does not need refills at this time but will in future.         Healthy Habits:     Getting at least 3 servings of Calcium per day:  NO    Bi-annual eye exam:  Yes    Dental care twice a year:  Yes    Sleep apnea or symptoms of sleep apnea:  None    Diet:  Regular (no restrictions)    Frequency of exercise:  None    Taking medications regularly:  Yes    Medication side effects:  Other    PHQ-2 Total Score: 0    Additional concerns today:  No           Today's PHQ-2 Score:   PHQ-2 ( 1999 Pf[employer]) 4/5/2022   Q1: Little interest or pleasure in doing things 0   Q2: Feeling down, depressed or hopeless 0   PHQ-2 Score 0   PHQ-2 Total Score (12-17 Years)- Positive if 3 or more points; Administer PHQ-A if positive -   Q1: Little interest or pleasure in doing things -   Q2: Feeling down, depressed or hopeless -   PHQ-2 Score -       Abuse: Current or Past (Physical, Sexual or Emotional) - No  Do you feel safe in your environment? Yes        Social History     Tobacco Use     Smoking status: Never Smoker     Smokeless tobacco: Never Used   Substance Use Topics     Alcohol use: Yes     Comment: Some weekends a couple drinks socially     If you drink alcohol do you typically have >3 drinks per day or >7 drinks per week? No    No flowsheet data found.    Reviewed orders with patient.  Reviewed health maintenance and updated orders accordingly - Yes  Lab work is in process  Labs reviewed in EPIC  BP Readings from Last 3 Encounters:    04/05/22 126/83   03/29/21 115/77   02/02/21 132/86    Wt Readings from Last 3 Encounters:   04/05/22 128.4 kg (283 lb)   03/29/21 121.1 kg (267 lb)   03/03/21 121.1 kg (267 lb)                  Patient Active Problem List   Diagnosis     Major depressive disorder, single episode, mild (H)     CARDIOVASCULAR SCREENING; LDL GOAL LESS THAN 160     Fatigue, unspecified type     Hearing aid worn     Sensorineural hearing loss (SNHL) of both ears     Morbid obesity (H)     Past Surgical History:   Procedure Laterality Date     DENTAL SURGERY  1999    wisdom teeth     HYSTERECTOMY, PAP NO LONGER INDICATED  12/18/2013    due to excessive bleeding - ovaries remain     ORTHOPEDIC SURGERY  2012    cyst removal from left wrist      SALPINGECTOMY  12/18/2013    ROBOTIC ASSISTED BILATERAL SALPINGECTOMY     TUBAL LIGATION  2007       Social History     Tobacco Use     Smoking status: Never Smoker     Smokeless tobacco: Never Used   Substance Use Topics     Alcohol use: Yes     Comment: Some weekends a couple drinks socially     Family History   Problem Relation Age of Onset     Other - See Comments Mother         Fibromyalga.      Thyroid Disease Mother      Depression Mother      Diabetes Father      Chronic Obstructive Pulmonary Disease Father      Asthma Father      Heart Failure Father      Obesity Father      Ovarian Cancer Maternal Grandfather      Diabetes Paternal Grandmother      Colon Cancer Other          Current Outpatient Medications   Medication Sig Dispense Refill     acetaminophen (TYLENOL) 325 MG tablet Take 325-650 mg by mouth       FLUoxetine (PROZAC) 20 MG capsule Take 1 capsule (20 mg) by mouth daily Take with 40 for total of 60       FLUoxetine (PROZAC) 40 MG capsule Take 1 capsule (40 mg) by mouth daily         Breast Cancer Screening:    FHS-7:   Breast CA Risk Assessment (FHS-7) 3/30/2022 4/4/2022   Did any of your first-degree relatives have breast or ovarian cancer? Unknown Unknown   Did any of your  relatives have bilateral breast cancer? No Unknown   Did any man in your family have breast cancer? No No   Did any woman in your family have breast and ovarian cancer? Yes No   Did any woman in your family have breast cancer before age 50 y? - No   Do you have 2 or more relatives with breast and/or ovarian cancer? No No   Do you have 2 or more relatives with breast and/or bowel cancer? No Yes     Ovarian cancer in maternal grandmother.   2 aunts with colon cancer.         Pertinent mammograms are reviewed under the imaging tab.    No longer need paps.       Reviewed and updated as needed this visit by clinical staff   Tobacco  Allergies  Meds   Med Hx  Surg Hx  Fam Hx  Soc Hx        Reviewed and updated as needed this visit by Provider                 Past Medical History:   Diagnosis Date     Depressive disorder       Past Surgical History:   Procedure Laterality Date     DENTAL SURGERY  1999    wisdom teeth     HYSTERECTOMY, PAP NO LONGER INDICATED  12/18/2013    due to excessive bleeding - ovaries remain     ORTHOPEDIC SURGERY  2012    cyst removal from left wrist      SALPINGECTOMY  12/18/2013    ROBOTIC ASSISTED BILATERAL SALPINGECTOMY     TUBAL LIGATION  2007     OB History   No obstetric history on file.       Review of Systems   Constitutional: Negative for chills and fever.   HENT: Negative for congestion, ear pain, hearing loss and sore throat.    Eyes: Negative for pain and visual disturbance.   Respiratory: Negative for cough and shortness of breath.    Cardiovascular: Negative for chest pain, palpitations and peripheral edema.   Gastrointestinal: Negative for abdominal pain, constipation, diarrhea, heartburn, hematochezia and nausea.   Breasts:  Negative for tenderness, breast mass and discharge.   Genitourinary: Negative for dysuria, frequency, genital sores, hematuria, pelvic pain, urgency, vaginal bleeding and vaginal discharge.   Musculoskeletal: Positive for myalgias. Negative for  "arthralgias and joint swelling.   Skin: Negative for rash.   Neurological: Negative for dizziness, weakness, headaches and paresthesias.   Psychiatric/Behavioral: Negative for mood changes. The patient is not nervous/anxious.         OBJECTIVE:   /83   Pulse 98   Temp 98  F (36.7  C) (Tympanic)   Resp 14   Ht 1.753 m (5' 9\")   Wt 128.4 kg (283 lb)   SpO2 100%   Breastfeeding No   BMI 41.79 kg/m    Physical Exam  GENERAL: alert, no distress and obese  EYES: Eyes grossly normal to inspection, PERRL and conjunctivae and sclerae normal  HENT: ear canals and TM's normal, nose and mouth without ulcers or lesions  NECK: no adenopathy, no asymmetry, masses, or scars and thyroid normal to palpation  RESP: lungs clear to auscultation - no rales, rhonchi or wheezes  BREAST: normal without masses, tenderness or nipple discharge and no palpable axillary masses or adenopathy  CV: regular rate and rhythm, normal S1 S2, no S3 or S4, no murmur, click or rub, no peripheral edema and peripheral pulses strong  ABDOMEN: soft, nontender, no hepatosplenomegaly, no masses and bowel sounds normal  MS: no gross musculoskeletal defects noted, no edema  SKIN: no suspicious lesions or rashes  NEURO: Normal strength and tone, mentation intact and speech normal  PSYCH: mentation appears normal, affect normal/bright        ASSESSMENT/PLAN:   (Z00.00) Routine general medical examination at a health care facility  (primary encounter diagnosis)  Comment:   Plan:     (E66.01) Morbid obesity (H)  Comment:   Plan: discussed  Is going to try to work on lifestyle, has not been per patient    (F32.0) Major depressive disorder, single episode, mild (H)  Comment:   Plan: stable  Recheck 1 year continue prozac 60 mg    (Z13.220) Screening, lipid  Comment:   Plan: Lipid panel reflex to direct LDL Fasting            (Z13.1) Screening for diabetes mellitus  Comment:   Plan: Hemoglobin A1c, Comprehensive metabolic panel         (BMP + Alb, Alk Phos, " "ALT, AST, Total. Bili,         TP)              Patient has been advised of split billing requirements and indicates understanding: Yes    COUNSELING:  Reviewed preventive health counseling, as reflected in patient instructions       Regular exercise       Healthy diet/nutrition    Estimated body mass index is 41.79 kg/m  as calculated from the following:    Height as of this encounter: 1.753 m (5' 9\").    Weight as of this encounter: 128.4 kg (283 lb).    Weight management plan: Discussed healthy diet and exercise guidelines    She reports that she has never smoked. She has never used smokeless tobacco.    Patient Instructions   Please return fasting for cholesterol and diabetes screening, you can make a lab only appointment for this.  No food or drink other than water for 10 hours.      Lifestyle recommendations:  Being overweight or obese puts you are risk of major health problems including but not limited to: heart disease/heart attack, stroke, high cholesterol, high blood pressure, and diabetes.  This is why it is important to be at a healthy weight for your height.     Exercise 30 minutes 3-5 times a week, if you can only do 10 minutes 3 times a week that is still shown to have great benefit!  Brisk walking even counts for this.  Consider free youtube videos for exercise that fits your needs and lifestyle.     Monitor your caffeine and soda intake, try to minimize these beverages    Drink plenty of water (about 70-80 ounces a day)    Try to eat a vegetable and fruit  with lunch and dinner.  Have a breakfast that contains protein such as eggs or oatmeal.  Decrease your white bread, pasta, and sweets intake.  Increase lean proteins like chicken or pork. Try to eat out 1-2 times a week or less.  Monitor your portion sizes, try using smaller plates if needed.  Eat slowly, this gives you time to be aware that your body is full.   Let me know at any time if you would like a referral to a " nutritionist!          Counseling Resources:  ATP IV Guidelines  Pooled Cohorts Equation Calculator  Breast Cancer Risk Calculator  BRCA-Related Cancer Risk Assessment: FHS-7 Tool  FRAX Risk Assessment  ICSI Preventive Guidelines  Dietary Guidelines for Americans, 2010  USDA's MyPlate  ASA Prophylaxis  Lung CA Screening    HERB Alvarado [facility]

## 2022-04-05 ENCOUNTER — OFFICE VISIT (OUTPATIENT)
Dept: FAMILY MEDICINE | Facility: CLINIC | Age: 42
End: 2022-04-05
Payer: COMMERCIAL

## 2022-04-05 VITALS
HEIGHT: 69 IN | SYSTOLIC BLOOD PRESSURE: 126 MMHG | BODY MASS INDEX: 41.92 KG/M2 | WEIGHT: 283 LBS | TEMPERATURE: 98 F | RESPIRATION RATE: 14 BRPM | DIASTOLIC BLOOD PRESSURE: 83 MMHG | OXYGEN SATURATION: 100 % | HEART RATE: 98 BPM

## 2022-04-05 DIAGNOSIS — Z13.1 SCREENING FOR DIABETES MELLITUS: ICD-10-CM

## 2022-04-05 DIAGNOSIS — F32.0 MAJOR DEPRESSIVE DISORDER, SINGLE EPISODE, MILD (H): ICD-10-CM

## 2022-04-05 DIAGNOSIS — Z00.00 ROUTINE GENERAL MEDICAL EXAMINATION AT A HEALTH CARE FACILITY: Primary | ICD-10-CM

## 2022-04-05 DIAGNOSIS — Z13.220 SCREENING, LIPID: ICD-10-CM

## 2022-04-05 DIAGNOSIS — E66.01 MORBID OBESITY (H): ICD-10-CM

## 2022-04-05 PROBLEM — E66.09 CLASS 2 OBESITY DUE TO EXCESS CALORIES WITHOUT SERIOUS COMORBIDITY WITH BODY MASS INDEX (BMI) OF 39.0 TO 39.9 IN ADULT: Chronic | Status: RESOLVED | Noted: 2018-07-31 | Resolved: 2022-04-05

## 2022-04-05 PROBLEM — E66.812 CLASS 2 OBESITY DUE TO EXCESS CALORIES WITHOUT SERIOUS COMORBIDITY WITH BODY MASS INDEX (BMI) OF 39.0 TO 39.9 IN ADULT: Chronic | Status: RESOLVED | Noted: 2018-07-31 | Resolved: 2022-04-05

## 2022-04-05 PROCEDURE — 99396 PREV VISIT EST AGE 40-64: CPT | Performed by: PHYSICIAN ASSISTANT

## 2022-04-05 RX ORDER — FLUOXETINE 40 MG/1
40 CAPSULE ORAL DAILY
COMMUNITY
Start: 2022-04-05 | End: 2023-08-07

## 2022-04-05 ASSESSMENT — ANXIETY QUESTIONNAIRES
GAD7 TOTAL SCORE: 0
5. BEING SO RESTLESS THAT IT IS HARD TO SIT STILL: NOT AT ALL
7. FEELING AFRAID AS IF SOMETHING AWFUL MIGHT HAPPEN: NOT AT ALL
3. WORRYING TOO MUCH ABOUT DIFFERENT THINGS: NOT AT ALL
6. BECOMING EASILY ANNOYED OR IRRITABLE: NOT AT ALL
IF YOU CHECKED OFF ANY PROBLEMS ON THIS QUESTIONNAIRE, HOW DIFFICULT HAVE THESE PROBLEMS MADE IT FOR YOU TO DO YOUR WORK, TAKE CARE OF THINGS AT HOME, OR GET ALONG WITH OTHER PEOPLE: NOT DIFFICULT AT ALL
2. NOT BEING ABLE TO STOP OR CONTROL WORRYING: NOT AT ALL
1. FEELING NERVOUS, ANXIOUS, OR ON EDGE: NOT AT ALL

## 2022-04-05 ASSESSMENT — PATIENT HEALTH QUESTIONNAIRE - PHQ9
5. POOR APPETITE OR OVEREATING: NOT AT ALL
SUM OF ALL RESPONSES TO PHQ QUESTIONS 1-9: 0

## 2022-04-05 NOTE — PATIENT INSTRUCTIONS
Please return fasting for cholesterol and diabetes screening, you can make a lab only appointment for this.  No food or drink other than water for 10 hours.      Lifestyle recommendations:  Being overweight or obese puts you are risk of major health problems including but not limited to: heart disease/heart attack, stroke, high cholesterol, high blood pressure, and diabetes.  This is why it is important to be at a healthy weight for your height.     Exercise 30 minutes 3-5 times a week, if you can only do 10 minutes 3 times a week that is still shown to have great benefit!  Brisk walking even counts for this.  Consider free youtube videos for exercise that fits your needs and lifestyle.     Monitor your caffeine and soda intake, try to minimize these beverages    Drink plenty of water (about 70-80 ounces a day)    Try to eat a vegetable and fruit  with lunch and dinner.  Have a breakfast that contains protein such as eggs or oatmeal.  Decrease your white bread, pasta, and sweets intake.  Increase lean proteins like chicken or pork. Try to eat out 1-2 times a week or less.  Monitor your portion sizes, try using smaller plates if needed.  Eat slowly, this gives you time to be aware that your body is full.   Let me know at any time if you would like a referral to a nutritionist!

## 2022-04-06 ASSESSMENT — ANXIETY QUESTIONNAIRES: GAD7 TOTAL SCORE: 0

## 2022-04-16 ENCOUNTER — LAB (OUTPATIENT)
Dept: LAB | Facility: CLINIC | Age: 42
End: 2022-04-16
Payer: COMMERCIAL

## 2022-04-16 DIAGNOSIS — Z13.1 SCREENING FOR DIABETES MELLITUS: ICD-10-CM

## 2022-04-16 DIAGNOSIS — Z13.220 SCREENING, LIPID: ICD-10-CM

## 2022-04-16 LAB — HBA1C MFR BLD: 5.1 % (ref 0–5.6)

## 2022-04-16 PROCEDURE — 36415 COLL VENOUS BLD VENIPUNCTURE: CPT

## 2022-04-16 PROCEDURE — 80053 COMPREHEN METABOLIC PANEL: CPT

## 2022-04-16 PROCEDURE — 83036 HEMOGLOBIN GLYCOSYLATED A1C: CPT

## 2022-04-16 PROCEDURE — 80061 LIPID PANEL: CPT

## 2022-04-18 LAB
ALBUMIN SERPL-MCNC: 3.4 G/DL (ref 3.4–5)
ALP SERPL-CCNC: 79 U/L (ref 40–150)
ALT SERPL W P-5'-P-CCNC: 23 U/L (ref 0–50)
ANION GAP SERPL CALCULATED.3IONS-SCNC: 7 MMOL/L (ref 3–14)
AST SERPL W P-5'-P-CCNC: 18 U/L (ref 0–45)
BILIRUB SERPL-MCNC: 0.4 MG/DL (ref 0.2–1.3)
BUN SERPL-MCNC: 10 MG/DL (ref 7–30)
CALCIUM SERPL-MCNC: 8.7 MG/DL (ref 8.5–10.1)
CHLORIDE BLD-SCNC: 106 MMOL/L (ref 94–109)
CHOLEST SERPL-MCNC: 189 MG/DL
CO2 SERPL-SCNC: 25 MMOL/L (ref 20–32)
CREAT SERPL-MCNC: 0.89 MG/DL (ref 0.52–1.04)
FASTING STATUS PATIENT QL REPORTED: YES
GFR SERPL CREATININE-BSD FRML MDRD: 83 ML/MIN/1.73M2
GLUCOSE BLD-MCNC: 93 MG/DL (ref 70–99)
HDLC SERPL-MCNC: 39 MG/DL
LDLC SERPL CALC-MCNC: 111 MG/DL
NONHDLC SERPL-MCNC: 150 MG/DL
POTASSIUM BLD-SCNC: 4.4 MMOL/L (ref 3.4–5.3)
PROT SERPL-MCNC: 7 G/DL (ref 6.8–8.8)
SODIUM SERPL-SCNC: 138 MMOL/L (ref 133–144)
TRIGL SERPL-MCNC: 196 MG/DL

## 2022-04-19 NOTE — RESULT ENCOUNTER NOTE
Oumar Last,       Your recent test results are attached, if you have any questions or concerns please feel free to contact me via e-mail or call [phone].  Cholesterol has improved.  Liver test normal. Sodium and potassium normal. Blood sugar (glucose) normal.  Creatinine and GFR normal, which means kidney function is normal.  A1c shows no diabetes.           It was a pleasure to see you at your recent office visit.      Sincerely,  Sasha Peoples PA-C

## 2022-06-15 ENCOUNTER — OFFICE VISIT (OUTPATIENT)
Dept: AUDIOLOGY | Facility: CLINIC | Age: 42
End: 2022-06-15
Payer: COMMERCIAL

## 2022-06-15 DIAGNOSIS — H90.3 SENSORINEURAL HEARING LOSS, BILATERAL: Primary | ICD-10-CM

## 2022-06-15 PROCEDURE — 92557 COMPREHENSIVE HEARING TEST: CPT

## 2022-06-15 PROCEDURE — 92550 TYMPANOMETRY & REFLEX THRESH: CPT

## 2022-06-15 PROCEDURE — V5275 EAR IMPRESSION: HCPCS

## 2022-06-15 PROCEDURE — 92591 PR HEARING AID EXAM BINAURAL: CPT

## 2022-06-15 NOTE — PROGRESS NOTES
AUDIOLOGY REPORT    SUBJECTIVE:  Shala Poole is a 42 year old female who was seen in the Audiology Clinic at the [facility] for audiologic evaluation, referred by self . The patient has been seen previously in this clinic on 4/27/2022 for assessment and results indicated bilateral sensorineural hearing loss. The patient reports no significant changes to her longstanding hearing loss and bilateral tinnitus. The patient denies bilateral otalgia, bilateral drainage and bilateral aural fullness. She does note a family history of hearing loss. The patient notes difficulty with communication in a variety of listening situations. She currently wears binaural ROXI Yung hearing aids which she purchased through [employer].     OBJECTIVE:    Otoscopic exam indicated ears are clear of cerumen bilaterally     Pure Tone Thresholds assessed using conventional audiometry with good, reliability from 250-8000 Hz bilaterally using insert earphones and circumaural headphones     RIGHT:  moderate-severe sloping to severe sensorineural hearing loss    LEFT:   moderate-severe sloping to severe sensorineural hearing loss    Tympanogram:    RIGHT: normal eardrum mobility    LEFT:  normal eardrum mobility    Reflexes (reported by stimulus ear):  RIGHT: Ipsilateral: absent at frequencies tested  RIGHT: Contralateral: absent at frequencies tested  LEFT:   Ipsilateral: absent at frequencies tested  LEFT:   Contralateral: absent at frequencies tested      Speech Reception Threshold:    RIGHT: 70 dB HL    LEFT:   70 dB HL  Speech Reception Thresholds are in good agreement with pure tone thresholds    Word Recognition Score:     RIGHT: 56% at 90 dB HL using NU-6 recorded word list.    LEFT:   20% at 90 dB HL using NU-6 recorded word list.    Binaural: 40% at  80 dB HL using NU-6 recorded word list.     Binaural: 48% at  85 dB HL using NU-6 recorded word list.     ASSESSMENT:   Bilateral sensorineural hearing loss was  found today. Compared to patient's previous audiogram dated 4/27/2018, hearing has remained stable with the exception of a 15 dB decline at 250 Hz in the right ear. Today s results were discussed with the patient in detail.     Hearing aid consult to follow      AUDIOLOGY REPORT    Patient is a hearing aid candidate. Patient would like to move forward with a hearing aid evaluation today. Therefore, the patient was presented with different options for amplification to help aid in communication. Discussed styles, levels of technology and monaural vs. binaural fitting.     The hearing aid(s) mutually chosen were:  Binaural: Phonak Audeo P50/P70 RT-ROXI  COLOR: P6 (silver)  BATTERY SIZE: rechargeable  EARMOLD/TIPS: cshell with canal lock   CANAL/ LENGTH: 2P    Otoscopy revealed ears are clear of cerumen bilaterally. Bilateral earmolds were taken without incident.    ASSESSMENT:   Reviewed purchase information and warranty information with patient. The 45 day trial period was explained to patient. The patient was given a copy of the [place] [employer] consumer brochure on purchasing hearing instruments. Patient risk factors have been provided to the patient in writing prior to the sale of the hearing aid per FDA regulation. The risk factors are also available in the User Instructional Booklet to be presented on the day of the hearing aid fitting. Hearing aids NOT ordered. Hearing aid evaluation completed.    PLAN:   Due to scheduling error, insurance benefits is unknown, therefore, Shala would like to inquire about benefits and then make her decision.  Once a decision has been made, hearing aids will be ordered and a hearing aid fitting will be scheduled. Due to decline in word recognition scores, patient will need medical clearance before hearing aids can be fit. Patient understood next steps and will set up an appointment for both ENT and hearing aid fitting- hopefully on the same day. Purchase  agreement will be completed on at the time of the fitting. Please contact this clinic with any questions or concerns.      Aleksandra Mendez.CCC-A  Licensed Audiologist  [place] #[id number]      06/15/22

## 2022-06-27 ENCOUNTER — TELEPHONE (OUTPATIENT)
Dept: AUDIOLOGY | Facility: CLINIC | Age: 42
End: 2022-06-27

## 2022-06-27 NOTE — TELEPHONE ENCOUNTER
Returned patient's phone call- she would like to move forward with the hearing aids chosen on 6/15/2022.     Hearing aids will be ordered. Patient to return on 7/27/2022 for an ENT appointment and same day fitting.     Neal Mendez, CCC-A  Licensed Audiologist  [place] #[id number]      06/27/22

## 2022-06-27 NOTE — TELEPHONE ENCOUNTER
[facility] Call Center    Phone Message    May a detailed message be left on voicemail: yes     Reason for Call: Other: Pt has questions about Hearing aids and would like a call back to discuss before ordering.      Action Taken: Message routed to:  [facility] ([facility] ENT    Travel Screening: Not Applicable

## 2022-07-27 ENCOUNTER — OFFICE VISIT (OUTPATIENT)
Dept: AUDIOLOGY | Facility: CLINIC | Age: 42
End: 2022-07-27
Payer: COMMERCIAL

## 2022-07-27 ENCOUNTER — OFFICE VISIT (OUTPATIENT)
Dept: OTOLARYNGOLOGY | Facility: CLINIC | Age: 42
End: 2022-07-27
Payer: COMMERCIAL

## 2022-07-27 VITALS
OXYGEN SATURATION: 100 % | HEART RATE: 94 BPM | SYSTOLIC BLOOD PRESSURE: 138 MMHG | RESPIRATION RATE: 16 BRPM | DIASTOLIC BLOOD PRESSURE: 91 MMHG

## 2022-07-27 DIAGNOSIS — H90.3 SENSORINEURAL HEARING LOSS (SNHL) OF BOTH EARS: Primary | ICD-10-CM

## 2022-07-27 DIAGNOSIS — H90.3 BILATERAL SENSORINEURAL HEARING LOSS: Primary | ICD-10-CM

## 2022-07-27 PROCEDURE — V5011 HEARING AID FITTING/CHECKING: HCPCS | Mod: RT

## 2022-07-27 PROCEDURE — V5160 DISPENSING FEE BINAURAL: HCPCS

## 2022-07-27 PROCEDURE — V5261 HEARING AID, DIGIT, BIN, BTE: HCPCS

## 2022-07-27 PROCEDURE — 99203 OFFICE O/P NEW LOW 30 MIN: CPT | Performed by: OTOLARYNGOLOGY

## 2022-07-27 PROCEDURE — V5264 EAR MOLD/INSERT: HCPCS | Mod: RT

## 2022-07-27 PROCEDURE — 99207 PR NO CHARGE LOS: CPT

## 2022-07-27 PROCEDURE — V5020 CONFORMITY EVALUATION: HCPCS | Mod: RT

## 2022-07-27 PROCEDURE — 92593 PR HEARING AID CHECK, BINAURAL: CPT

## 2022-07-27 NOTE — LETTER
7/27/2022         RE: Shala Poole  [address]  [place] [address]        Dear Colleague,    Thank you for referring your patient, Shala Poole, to the [facility] [place]. Please see a copy of my visit note below.    Chief Complaint - Hearing loss    History of Present Illness - Shala Poole is a 42 year old female who presents to me today with hearing loss in both ears.  It has been present and noticeable for approximately 10 years. She has worsening speech understanding. She never underwent genetic testing. The patient has noticed increased difficulty hearing certain sounds and difficulty in understanding others. She wears aids.  There is no history of chronic ear disease or ear surgery.  With regards to recreational, , and work-related noise exposure she had some in the past, but not now. + family history of hearing loss on maternal side at a younger age.     Past Medical History -   Patient Active Problem List   Diagnosis     Major depressive disorder, single episode, mild (H)     CARDIOVASCULAR SCREENING; LDL GOAL LESS THAN 160     Fatigue, unspecified type     Hearing aid worn     Sensorineural hearing loss (SNHL) of both ears     Morbid obesity (H)       Current Medications -   Current Outpatient Medications:      acetaminophen (TYLENOL) 325 MG tablet, Take 325-650 mg by mouth, Disp: , Rfl:      FLUoxetine (PROZAC) 20 MG capsule, Take 1 capsule (20 mg) by mouth daily Take with 40 for total of 60, Disp: , Rfl:      FLUoxetine (PROZAC) 40 MG capsule, Take 1 capsule (40 mg) by mouth daily, Disp: , Rfl:     Allergies - No Known Allergies    Social History -   Social History     Socioeconomic History     Marital status:    Occupational History     Occupation:  for [employer]   Tobacco Use     Smoking status: Never Smoker     Smokeless tobacco: Never Used   Substance and Sexual Activity     Alcohol use: Yes     Comment: Some weekends a  couple drinks socially     Drug use: No     Sexual activity: Yes     Partners: Male     Birth control/protection: Female Surgical   Other Topics Concern     Parent/sibling w/ CABG, MI or angioplasty before 65F 55M? No       Family History -   Family History   Problem Relation Age of Onset     Other - See Comments Mother         Fibromyalga.      Thyroid Disease Mother      Depression Mother      Diabetes Father      Chronic Obstructive Pulmonary Disease Father      Asthma Father      Heart Failure Father      Obesity Father      Ovarian Cancer Maternal Grandfather      Diabetes Paternal Grandmother      Colon Cancer Other        Physical Exam  General - The patient is in no distress.  Alert and oriented to person and place, answers questions and cooperates with examination appropriately.   Voice and Breathing - The patient was breathing comfortably without the use of accessory muscles. There was no wheezing, stridor, or stertor.  The patients voice was clear and strong.  Ears - The auricles are normal. The tympanic membranes are normal in appearance, bony landmarks are intact.  No retraction, perforation, or masses.  No fluid or purulence was seen in the external canal or the middle ear. No evidence of infection of the middle ear or external canal, cerumen was normal in appearance.    Audiologic Studies - An audiogram and tympanogram was performed 6/15/22 as part of the evaluation and personally reviewed. The tympanogram shows a normal Type A curve, with normal canal volume and middle ear pressure.  There is no sign of eustachian tube dysfunction or middle ear effusion.  The audiogram showed a significant down-sloping low (moderate) to high (severe) frequency sensorineural hearing loss. She has poor word recognition scores. Hearing has declined some since 2018 audiogram.    Assessment and Plan - Shala Poole is a 42 year old female who presents to me today with sensorineural hearing loss, long-standing.  This  is likely familial. I recommend new hearing aids. If these fail to help enough, I recommend cochlear implant testing and consideration of cochlear implantation surgery. She should also have her kids routinely tested for hearing loss. She can consider genetic testing. Repeat audiogram in 1-2 years.       Sterling Santillan MD  Otolaryngology  [facility]          Again, thank you for allowing me to participate in the care of your patient.        Sincerely,        Sterling Santillan MD

## 2022-07-27 NOTE — PATIENT INSTRUCTIONS

## 2022-07-27 NOTE — PROGRESS NOTES
AUDIOLOGY REPORT    SUBJECTIVE: Shala Poole, a 42 year old female, was seen in the Audiology Clinic at [facility] today for a Binaural hearing aid fitting. Previous results have revealed a bilateral  sensorineural hearing loss. The patient was given medical clearance to pursue amplification by  Sterling Santillan MD.      OBJECTIVE:  Prior to fitting, a hearing aid check was performed to ensure device functionality. The hearing aid conformity evaluation was completed.The hearing aids were placed and they provided a good fit. Real-ear-probe-microphone measurements were completed on the [employer] system and were a good match to NAL-NL2 target with soft sounds audible, moderate sounds comfortable, and loud sounds below discomfort. UCLs are verified through maximum power output measures and demonstrate appropriate limiting of loud inputs. Ms. Poole was oriented to proper hearing aid use, care, cleaning (no water, dry brush), batteries (size 13, insertion/removal, toxicity, low-battery signal), aid insertion/removal, user booklet, warranty information, storage cases, and other hearing aid details. The patient confirmed understanding of hearing aid use and care, and showed proper insertion of hearing aid and batteries while in the office today. Ms. Poole reported good volume and sound quality today.    EAR(S) FIT: Binaural  HEARING AID MAKE: Right: Phonak; Left: Phonak  HEARING AID MODEL #: Right: Audeo P50; Left: Audeo P50  HEARING AID STYLE: Right: ROXI; Left: ROXI   LENGTH: Right:  2P; Left:  2P  EARMOLDS: Right: Acrylic Cshell SN:[id number]; Left:  Acrylic Cshell SN: [id number]  SERIAL NUMBERS: Right: [id number]; Left: [id number]  WARRANTY END DATE: Right: 10/5/2025; Left:: 10/5/2025      CHARGES:   Earmold(s): , Qty 2, $160.00,   Hearing Aid Check: Binaural, 30010, $81.00  Dispensing Fee: Binaural, , $500.00,   Fit/Orientation: Binaural, , $416.00  Hearing Aid Conformity  Evaluation: 2, , $174.00  Hearing Aid Digital: Binaural, BTE, . $2429  Total: $3760    **24 units of size 13 batteries were dispensed today       ASSESSMENT: Binaural hearing aid fitting completed today. Verification measures were performed. The 45 day trial period was explained to patient, and they expressed understanding. Ms. Poole signed the Hearing Aid Purchase Agreement and was given a copy, as well as details on her hearing aids. Patient was counseled that exact out of pocket amounts cannot be determined for hearing aid claims being sent to insurance. Any insurance coverage information presented to the patient is an estimate only, and is not a guarantee of payment. Patient has been advised to check with their own insurance.    PLAN: Ms. Poole will return for follow-up in 2-3 weeks for a hearing aid review appointment. Please call this clinic with questions regarding today s appointment.    Neal Mendez, CCC-A  Licensed Audiologist  [place] #[id number]      07/27/22

## 2022-07-27 NOTE — PROGRESS NOTES
Chief Complaint - Hearing loss    History of Present Illness - Shala Poole is a 42 year old female who presents to me today with hearing loss in both ears.  It has been present and noticeable for approximately 10 years. She has worsening speech understanding. She never underwent genetic testing. The patient has noticed increased difficulty hearing certain sounds and difficulty in understanding others. She wears aids.  There is no history of chronic ear disease or ear surgery.  With regards to recreational, , and work-related noise exposure she had some in the past, but not now. + family history of hearing loss on maternal side at a younger age.     Past Medical History -   Patient Active Problem List   Diagnosis     Major depressive disorder, single episode, mild (H)     CARDIOVASCULAR SCREENING; LDL GOAL LESS THAN 160     Fatigue, unspecified type     Hearing aid worn     Sensorineural hearing loss (SNHL) of both ears     Morbid obesity (H)       Current Medications -   Current Outpatient Medications:      acetaminophen (TYLENOL) 325 MG tablet, Take 325-650 mg by mouth, Disp: , Rfl:      FLUoxetine (PROZAC) 20 MG capsule, Take 1 capsule (20 mg) by mouth daily Take with 40 for total of 60, Disp: , Rfl:      FLUoxetine (PROZAC) 40 MG capsule, Take 1 capsule (40 mg) by mouth daily, Disp: , Rfl:     Allergies - No Known Allergies    Social History -   Social History     Socioeconomic History     Marital status:    Occupational History     Occupation:  for [employer]   Tobacco Use     Smoking status: Never Smoker     Smokeless tobacco: Never Used   Substance and Sexual Activity     Alcohol use: Yes     Comment: Some weekends a couple drinks socially     Drug use: No     Sexual activity: Yes     Partners: Male     Birth control/protection: Female Surgical   Other Topics Concern     Parent/sibling w/ CABG, MI or angioplasty before 65F 55M? No       Family History -   Family  History   Problem Relation Age of Onset     Other - See Comments Mother         Fibromyalga.      Thyroid Disease Mother      Depression Mother      Diabetes Father      Chronic Obstructive Pulmonary Disease Father      Asthma Father      Heart Failure Father      Obesity Father      Ovarian Cancer Maternal Grandfather      Diabetes Paternal Grandmother      Colon Cancer Other        Physical Exam  General - The patient is in no distress.  Alert and oriented to person and place, answers questions and cooperates with examination appropriately.   Voice and Breathing - The patient was breathing comfortably without the use of accessory muscles. There was no wheezing, stridor, or stertor.  The patients voice was clear and strong.  Ears - The auricles are normal. The tympanic membranes are normal in appearance, bony landmarks are intact.  No retraction, perforation, or masses.  No fluid or purulence was seen in the external canal or the middle ear. No evidence of infection of the middle ear or external canal, cerumen was normal in appearance.    Audiologic Studies - An audiogram and tympanogram was performed 6/15/22 as part of the evaluation and personally reviewed. The tympanogram shows a normal Type A curve, with normal canal volume and middle ear pressure.  There is no sign of eustachian tube dysfunction or middle ear effusion.  The audiogram showed a significant down-sloping low (moderate) to high (severe) frequency sensorineural hearing loss. She has poor word recognition scores. Hearing has declined some since 2018 audiogram.    Assessment and Plan - Shala Poole is a 42 year old female who presents to me today with sensorineural hearing loss, long-standing.  This is likely familial. I recommend new hearing aids. If these fail to help enough, I recommend cochlear implant testing and consideration of cochlear implantation surgery. She should also have her kids routinely tested for hearing loss. She can consider  genetic testing. Repeat audiogram in 1-2 years.       Sterling Santillan MD  Otolaryngology  [facility]

## 2022-08-10 ENCOUNTER — OFFICE VISIT (OUTPATIENT)
Dept: AUDIOLOGY | Facility: CLINIC | Age: 42
End: 2022-08-10
Payer: COMMERCIAL

## 2022-08-10 DIAGNOSIS — H90.3 BILATERAL SENSORINEURAL HEARING LOSS: Primary | ICD-10-CM

## 2022-08-10 PROCEDURE — V5299 HEARING SERVICE: HCPCS

## 2022-08-10 NOTE — PROGRESS NOTES
AUDIOLOGY REPORT    SUBJECTIVE:Shala Poole is a 42 year old female who was seen in the Audiology Clinic at the [facility] on 8/10/2022  for a follow-up check regarding the fitting of new hearing aids. Previous results have revealed sensorineural hearing loss, bilaterally.  The patient has been seen previously in this clinic and was fit with Phonak Audeo P50-13T hearing aids on 7/27/2022.  Shala reports good sound quality with the hearing aids, but would the to increase the volume slightly.     OBJECTIVE:   The International Outcome Inventory-Hearing Aids (IOI-HA) was administered today.The patient s responses to the 7 questions can be compared to normative data relative to how others are performing with their hearing aids, as well as focusing audiologic care and counseling.This patient s Quality of Life score (Question 7) was 5, which is above normative average.     Based on patient report, the following changes were made; hearing aids were connected to software where overal gain was increased by 1 step. Patient reports good sound quality and overall fit.     Reviewed 45 day trial period, care, cleaning (no water, dry brush), batteries (size 13) insertion/removal, toxicity, low-battery signal), aid insertion/removal, volume adjustment (if applicable), user booklet, warranty information, storage cases, and other hearing aid details.     No charge visit today (in warranty hearing aid check).     ASSESSMENT: A follow-up appointment for hearing aid fitting was completed today. IOI-HA administered today. Changes to hearing aid was completed as outlined above.     PLAN:Shala will return for follow-up as needed, or at least every 9-12 months for cleaning and assessment of hearing aid.  Please call this clinic with any questions regarding today s appointment.    Neal Mendez, CCC-A  Licensed Audiologist  [place] #[id number]      08/10/22

## 2022-09-11 ENCOUNTER — HEALTH MAINTENANCE LETTER (OUTPATIENT)
Age: 42
End: 2022-09-11

## 2022-09-20 ENCOUNTER — OFFICE VISIT (OUTPATIENT)
Dept: FAMILY MEDICINE | Facility: CLINIC | Age: 42
End: 2022-09-20
Payer: COMMERCIAL

## 2022-09-20 VITALS
SYSTOLIC BLOOD PRESSURE: 134 MMHG | OXYGEN SATURATION: 98 % | BODY MASS INDEX: 41.64 KG/M2 | DIASTOLIC BLOOD PRESSURE: 96 MMHG | WEIGHT: 282 LBS | HEART RATE: 98 BPM | TEMPERATURE: 97.9 F

## 2022-09-20 DIAGNOSIS — Z23 HIGH PRIORITY FOR 2019-NCOV VACCINE: ICD-10-CM

## 2022-09-20 DIAGNOSIS — R42 DIZZINESS: ICD-10-CM

## 2022-09-20 DIAGNOSIS — E66.01 MORBID OBESITY (H): ICD-10-CM

## 2022-09-20 DIAGNOSIS — I10 ESSENTIAL HYPERTENSION: Primary | ICD-10-CM

## 2022-09-20 LAB
ANION GAP SERPL CALCULATED.3IONS-SCNC: 6 MMOL/L (ref 3–14)
BASOPHILS # BLD AUTO: 0.1 10E3/UL (ref 0–0.2)
BASOPHILS NFR BLD AUTO: 1 %
BUN SERPL-MCNC: 12 MG/DL (ref 7–30)
CALCIUM SERPL-MCNC: 8.7 MG/DL (ref 8.5–10.1)
CHLORIDE BLD-SCNC: 108 MMOL/L (ref 94–109)
CO2 SERPL-SCNC: 27 MMOL/L (ref 20–32)
CREAT SERPL-MCNC: 0.9 MG/DL (ref 0.52–1.04)
EOSINOPHIL # BLD AUTO: 0.2 10E3/UL (ref 0–0.7)
EOSINOPHIL NFR BLD AUTO: 2 %
ERYTHROCYTE [DISTWIDTH] IN BLOOD BY AUTOMATED COUNT: 13.9 % (ref 10–15)
GFR SERPL CREATININE-BSD FRML MDRD: 82 ML/MIN/1.73M2
GLUCOSE BLD-MCNC: 101 MG/DL (ref 70–99)
HCT VFR BLD AUTO: 38.9 % (ref 35–47)
HGB BLD-MCNC: 12.8 G/DL (ref 11.7–15.7)
LYMPHOCYTES # BLD AUTO: 1.6 10E3/UL (ref 0.8–5.3)
LYMPHOCYTES NFR BLD AUTO: 21 %
MCH RBC QN AUTO: 28.6 PG (ref 26.5–33)
MCHC RBC AUTO-ENTMCNC: 32.9 G/DL (ref 31.5–36.5)
MCV RBC AUTO: 87 FL (ref 78–100)
MONOCYTES # BLD AUTO: 0.4 10E3/UL (ref 0–1.3)
MONOCYTES NFR BLD AUTO: 6 %
NEUTROPHILS # BLD AUTO: 5.5 10E3/UL (ref 1.6–8.3)
NEUTROPHILS NFR BLD AUTO: 71 %
PLATELET # BLD AUTO: 321 10E3/UL (ref 150–450)
POTASSIUM BLD-SCNC: 3.5 MMOL/L (ref 3.4–5.3)
RBC # BLD AUTO: 4.48 10E6/UL (ref 3.8–5.2)
SODIUM SERPL-SCNC: 141 MMOL/L (ref 133–144)
TSH SERPL DL<=0.005 MIU/L-ACNC: 1.9 MU/L (ref 0.4–4)
WBC # BLD AUTO: 7.8 10E3/UL (ref 4–11)

## 2022-09-20 PROCEDURE — 90686 IIV4 VACC NO PRSV 0.5 ML IM: CPT | Performed by: PHYSICIAN ASSISTANT

## 2022-09-20 PROCEDURE — 90471 IMMUNIZATION ADMIN: CPT | Performed by: PHYSICIAN ASSISTANT

## 2022-09-20 PROCEDURE — 80048 BASIC METABOLIC PNL TOTAL CA: CPT | Performed by: PHYSICIAN ASSISTANT

## 2022-09-20 PROCEDURE — 36415 COLL VENOUS BLD VENIPUNCTURE: CPT | Performed by: PHYSICIAN ASSISTANT

## 2022-09-20 PROCEDURE — 99214 OFFICE O/P EST MOD 30 MIN: CPT | Mod: 25 | Performed by: PHYSICIAN ASSISTANT

## 2022-09-20 PROCEDURE — 84443 ASSAY THYROID STIM HORMONE: CPT | Performed by: PHYSICIAN ASSISTANT

## 2022-09-20 PROCEDURE — 0124A COVID-19,PF,PFIZER BOOSTER BIVALENT: CPT | Performed by: PHYSICIAN ASSISTANT

## 2022-09-20 PROCEDURE — 85025 COMPLETE CBC W/AUTO DIFF WBC: CPT | Performed by: PHYSICIAN ASSISTANT

## 2022-09-20 PROCEDURE — 91312 COVID-19,PF,PFIZER BOOSTER BIVALENT: CPT | Performed by: PHYSICIAN ASSISTANT

## 2022-09-20 RX ORDER — AMLODIPINE BESYLATE 2.5 MG/1
2.5 TABLET ORAL DAILY
Qty: 30 TABLET | Refills: 1 | Status: SHIPPED | OUTPATIENT
Start: 2022-09-20 | End: 2022-10-18

## 2022-09-20 ASSESSMENT — ANXIETY QUESTIONNAIRES
6. BECOMING EASILY ANNOYED OR IRRITABLE: NOT AT ALL
GAD7 TOTAL SCORE: 3
2. NOT BEING ABLE TO STOP OR CONTROL WORRYING: SEVERAL DAYS
3. WORRYING TOO MUCH ABOUT DIFFERENT THINGS: NOT AT ALL
7. FEELING AFRAID AS IF SOMETHING AWFUL MIGHT HAPPEN: NOT AT ALL
1. FEELING NERVOUS, ANXIOUS, OR ON EDGE: SEVERAL DAYS
IF YOU CHECKED OFF ANY PROBLEMS ON THIS QUESTIONNAIRE, HOW DIFFICULT HAVE THESE PROBLEMS MADE IT FOR YOU TO DO YOUR WORK, TAKE CARE OF THINGS AT HOME, OR GET ALONG WITH OTHER PEOPLE: SOMEWHAT DIFFICULT
5. BEING SO RESTLESS THAT IT IS HARD TO SIT STILL: NOT AT ALL
GAD7 TOTAL SCORE: 3

## 2022-09-20 ASSESSMENT — PATIENT HEALTH QUESTIONNAIRE - PHQ9
5. POOR APPETITE OR OVEREATING: SEVERAL DAYS
SUM OF ALL RESPONSES TO PHQ QUESTIONS 1-9: 3

## 2022-09-20 ASSESSMENT — PAIN SCALES - GENERAL: PAINLEVEL: NO PAIN (0)

## 2022-09-20 NOTE — PROGRESS NOTES
Assessment & Plan     Essential hypertension  Check the following tests today.   She will watch MyChart for results and any changes.   Start amlodipine 2.5 mg. Side effects and how to take the medication discussed.  She will monitor her blood pressure at home.   Recheck with primary provider in 1 month  - TSH with free T4 reflex; Future  - Basic metabolic panel  (Ca, Cl, CO2, Creat, Gluc, K, Na, BUN); Future  - CBC with platelets and differential; Future  - amLODIPine (NORVASC) 2.5 MG tablet; Take 1 tablet (2.5 mg) by mouth daily    Dizziness  - TSH with free T4 reflex; Future  - Basic metabolic panel  (Ca, Cl, CO2, Creat, Gluc, K, Na, BUN); Future  - CBC with platelets and differential; Future    Morbid obesity (H)  She has interest in Comp Weight Management.   This also contributes to her blood pressure.   Referral placed.   - Comprehensive Weight Management; Future    High priority for 2019-nCoV vaccine                   Return in about 1 month (around 10/20/2022) for BP Recheck, with primary provider.    Kristen M. Kehr, PA-C [facility]   Shala is a 42 year old, presenting for the following health issues:  Hypertension, Dizziness, and Imm/Inj (COVID-19 VACCINE)    Shala has tried to give blood and turned away due to elevated blood pressure.   She has not been monitoring at home.   She also has lightheadedness at times and fatigue.   Occasional palpitations.       History of Present Illness       Reason for visit:  High Blood Pressure , dizziness  Symptom onset:  More than a month  Symptoms include:  Dizziness, fatigue  Symptom intensity:  Moderate  Symptom progression:  Staying the same  Had these symptoms before:  No  What makes it worse:  No  What makes it better:  No    She eats 2-3 servings of fruits and vegetables daily.She consumes 2 sweetened beverage(s) daily.She exercises with enough effort to increase her heart rate 20 to 29 minutes per day.  She exercises  with enough effort to increase her heart rate 3 or less days per week.   She is taking medications regularly.             Review of Systems   Constitutional, HEENT, cardiovascular, pulmonary, GI, , musculoskeletal, neuro, skin, endocrine and psych systems are negative, except as otherwise noted.      Objective    BP (!) 134/96   Pulse 98   Temp 97.9  F (36.6  C) (Tympanic)   Wt 127.9 kg (282 lb)   SpO2 98%   BMI 41.64 kg/m    Body mass index is 41.64 kg/m .  Physical Exam   GENERAL: healthy, alert and no distress  RESP: lungs clear to auscultation - no rales, rhonchi or wheezes  CV: regular rate and rhythm, normal S1 S2, no S3 or S4, no murmur, click or rub, no peripheral edema and peripheral pulses strong  MS: no gross musculoskeletal defects noted, no edema  SKIN: no suspicious lesions or rashes  PSYCH: mentation appears normal, affect normal/bright

## 2022-09-20 NOTE — NURSING NOTE
"Chief Complaint   Patient presents with     Hypertension     Dizziness     Imm/Inj     COVID-19 VACCINE       Initial BP (!) 122/90   Pulse 98   Temp 97.9  F (36.6  C) (Tympanic)   Wt 127.9 kg (282 lb)   SpO2 98%   BMI 41.64 kg/m   Estimated body mass index is 41.64 kg/m  as calculated from the following:    Height as of 4/5/22: 1.753 m (5' 9\").    Weight as of this encounter: 127.9 kg (282 lb).  Medication Reconciliation: complete    KENNETH Chapman MA    "

## 2022-10-18 ENCOUNTER — OFFICE VISIT (OUTPATIENT)
Dept: FAMILY MEDICINE | Facility: CLINIC | Age: 42
End: 2022-10-18
Payer: COMMERCIAL

## 2022-10-18 VITALS
TEMPERATURE: 96.8 F | HEART RATE: 86 BPM | BODY MASS INDEX: 40.46 KG/M2 | OXYGEN SATURATION: 99 % | DIASTOLIC BLOOD PRESSURE: 78 MMHG | SYSTOLIC BLOOD PRESSURE: 114 MMHG | WEIGHT: 274 LBS

## 2022-10-18 DIAGNOSIS — I10 ESSENTIAL HYPERTENSION: ICD-10-CM

## 2022-10-18 PROCEDURE — 99213 OFFICE O/P EST LOW 20 MIN: CPT | Performed by: PHYSICIAN ASSISTANT

## 2022-10-18 RX ORDER — AMLODIPINE BESYLATE 2.5 MG/1
2.5 TABLET ORAL DAILY
Qty: 90 TABLET | Refills: 3 | Status: SHIPPED | OUTPATIENT
Start: 2022-10-18 | End: 2022-10-20

## 2022-10-18 NOTE — PROGRESS NOTES
"  Assessment & Plan     Essential hypertension  Stable with the current dose of medication.   Continue to monitor BP  Recheck in 6 months with ancillary nursing.   - amLODIPine (NORVASC) 2.5 MG tablet; Take 1 tablet (2.5 mg) by mouth daily             BMI:   Estimated body mass index is 40.46 kg/m  as calculated from the following:    Height as of 4/5/22: 1.753 m (5' 9\").    Weight as of this encounter: 124.3 kg (274 lb).   Weight management plan: Weight loss management clinic        Return in about 6 months (around 4/18/2023) for BP recheck with ancillary nurse.    Kristen M. Kehr, PA-C [facility] ABILIO Last is a 42 year old, presenting for the following health issues:  Hypertension      She started the amlodipine 2.5 mg at her last appointment and blood pressure is at goal.   She does not have side effects and feels well.     Also discussed weight loss management and she has an appointment within the next few weeks.       History of Present Illness       Hypertension: She presents for follow up of hypertension.  She does not check blood pressure  regularly outside of the clinic. Outside blood pressures have been over 140/90. She does not follow a low salt diet.               Review of Systems   Constitutional, HEENT, cardiovascular, pulmonary, GI, , musculoskeletal, neuro, skin, endocrine and psych systems are negative, except as otherwise noted.      Objective    /78   Pulse 86   Temp 96.8  F (36  C) (Tympanic)   Wt 124.3 kg (274 lb)   SpO2 99%   BMI 40.46 kg/m    Body mass index is 40.46 kg/m .  Physical Exam   GENERAL: healthy, alert and no distress  PSYCH: mentation appears normal, affect normal/bright                    "

## 2022-10-18 NOTE — NURSING NOTE
"Chief Complaint   Patient presents with     Hypertension       Initial /78   Pulse 86   Temp 96.8  F (36  C) (Tympanic)   Wt 124.3 kg (274 lb)   SpO2 99%   BMI 40.46 kg/m   Estimated body mass index is 40.46 kg/m  as calculated from the following:    Height as of 4/5/22: 1.753 m (5' 9\").    Weight as of this encounter: 124.3 kg (274 lb).  Medication Reconciliation: complete    KENNETH Chapman MA    "

## 2022-10-19 DIAGNOSIS — I10 ESSENTIAL HYPERTENSION: ICD-10-CM

## 2022-10-20 ENCOUNTER — TELEPHONE (OUTPATIENT)
Dept: FAMILY MEDICINE | Facility: CLINIC | Age: 42
End: 2022-10-20

## 2022-10-20 DIAGNOSIS — I10 ESSENTIAL HYPERTENSION: ICD-10-CM

## 2022-10-20 RX ORDER — AMLODIPINE BESYLATE 2.5 MG/1
2.5 TABLET ORAL DAILY
Qty: 90 TABLET | Refills: 3 | OUTPATIENT
Start: 2022-10-20

## 2022-10-20 RX ORDER — AMLODIPINE BESYLATE 2.5 MG/1
2.5 TABLET ORAL DAILY
Qty: 90 TABLET | Refills: 3 | Status: SHIPPED | OUTPATIENT
Start: 2022-10-20 | End: 2023-08-07

## 2022-10-20 NOTE — TELEPHONE ENCOUNTER
Reason for Call:  Other prescription    Detailed comments: [facility] called for a refill for patients medication Amlodapine 2.5 MG from Krsten Kehr PA at [facility].    Please contact.  Thank you.    Phone Number Patient can be reached at: Other phone number:  [phone]*    Best Time: any    Can we leave a detailed message on this number? YES    Call taken on 10/20/2022 at 10:54 AM by Monique Yen

## 2022-10-20 NOTE — TELEPHONE ENCOUNTER
Medication Question or Refill    Contacts       Type Contact Phone/Fax    10/20/2022 10:54 AM CDT Phone (Incoming) [employer] (Other) [phone]          What medication are you calling about (include dose and sig)?: amLODIPine (NORVASC) 2.5 MG tablet    Controlled Substance Agreement on file:   CSA -- Patient Level:    CSA: None found at the patient level.       Who prescribed the medication?: PCP    Do you need a refill? Yes:     When did you use the medication last? NA    Patient offered an appointment? No    Do you have any questions or concerns?  No    Preferred Pharmacy:     [facility] - [place], [place] - [address]  [phone]    Could we [facility] this information to you [facility] or would you prefer to receive a phone call?:   Patient would prefer a phone call   Okay to leave a detailed message?: Yes at Home number on file [phone] (home)

## 2022-12-19 ENCOUNTER — OFFICE VISIT (OUTPATIENT)
Dept: ENDOCRINOLOGY | Facility: CLINIC | Age: 42
End: 2022-12-19
Payer: COMMERCIAL

## 2022-12-19 ENCOUNTER — ALLIED HEALTH/NURSE VISIT (OUTPATIENT)
Dept: ENDOCRINOLOGY | Facility: CLINIC | Age: 42
End: 2022-12-19
Payer: COMMERCIAL

## 2022-12-19 VITALS
SYSTOLIC BLOOD PRESSURE: 128 MMHG | WEIGHT: 276 LBS | HEIGHT: 68 IN | BODY MASS INDEX: 41.83 KG/M2 | OXYGEN SATURATION: 99 % | HEART RATE: 93 BPM | DIASTOLIC BLOOD PRESSURE: 81 MMHG

## 2022-12-19 DIAGNOSIS — E66.01 MORBID OBESITY (H): ICD-10-CM

## 2022-12-19 DIAGNOSIS — I10 ESSENTIAL HYPERTENSION: ICD-10-CM

## 2022-12-19 DIAGNOSIS — Z71.3 NUTRITIONAL COUNSELING: Primary | ICD-10-CM

## 2022-12-19 PROCEDURE — 97803 MED NUTRITION INDIV SUBSEQ: CPT | Performed by: DIETITIAN, REGISTERED

## 2022-12-19 PROCEDURE — 99204 OFFICE O/P NEW MOD 45 MIN: CPT | Performed by: INTERNAL MEDICINE

## 2022-12-19 ASSESSMENT — PAIN SCALES - GENERAL: PAINLEVEL: NO PAIN (0)

## 2022-12-19 NOTE — PROGRESS NOTES
"    New Medical Weight Management Consult    PATIENT:  Shala Poole  MRN:         [id number]  :         1980  DANNIELLE:         2022    Dear Kristen M. Kehr, PA-C,    I had the pleasure of seeing your patient, Shala Poole.  Full intake/assessment done to determine barriers to weight loss success and develop a treatment plan.  Shala Poole is a 42 year old female interested in treatment of medical problems associated with weight.  Her weight today is 276 lbs 0 oz, Body mass index is 41.97 kg/m ., and she has the following co-morbidities:     2022   I have the following health issues associated with obesity: High Blood Pressure, GERD (Reflux)   I have the following symptoms associated with obesity: Knee Pain, Depression, Back Pain, Fatigue, Hip Pain       Patient Goals 2022   I am interested in having a healthier weight to diminish current health problems: Yes   I am interested in having a healthier weight in order to prevent future health problems: Yes   I am interested in having a healthier weight in order to have a future surgery: No       Referring Provider 2022   Please name the provider who referred you to Medical Weight Management.  If you do not know, please answer: \"I Don't Know\". Kristen kehr       Wt Readings from Last 4 Encounters:   22 125.2 kg (276 lb)   10/18/22 124.3 kg (274 lb)   22 127.9 kg (282 lb)   22 128.4 kg (283 lb)       Weight History 2022   How concerned are you about your weight? Somewhat Concerned   Would you describe your weight gain as gradual? Yes   I became overweight: After Pregnancy   The following factors have contributed to my weight gain:  Mental Health Issues, Eating Wrong Types of Food, Eating Too Much, Lack of Exercise, Genetic (Runs in the Family), Stress   I have tried the following methods to lose weight: Watching Portions or Calories, Exercise, Weight Watchers, Atkins-type Diet (Low Carb/High Protein), Meal " Replacements, Fasting   My lowest weight since age 19 was: 170   My highest weight since age 19 was: 285   The most weight I have ever lost was: (lbs) 10   I have the following family history of obesity/being overweight:  My father is overweight, One or more of my siblings are overweight, Many of my relatives are overweight   Has anyone in your family had weight loss surgery? No   How has your weight changed over the last year?  Lost   How many pounds? 5-10lbs       Diet Recall 12/7/2022   Glass juice/day 0   Glass milk/day 0   Glass sugary drink/day 0   How many cans/bottles of sugar pop/soda/tea/sports drinks do you drink in a day? 3   Diet drink/day 2   Alcohol 2-4 Times a Month   Drinks/day 3-4       Eating Habits 12/7/2022   Generally, my meals include foods like these: bread, pasta, rice, potatoes, corn, crackers, sweet dessert, pop, or juice. Almost Everyday   Generally, my meals include foods like these: fried meats, brats, burgers, french fries, pizza, cheese, chips, or ice cream. A Few Times a Week   Eat fast food (like McDonalds, Burger [employer]ran, Taco Bell). A Few Times a Week   Eat at a buffet or sit-down restaurant. Less Than Weekly   Eat most of my meals in front of the TV or computer. A Few Times a Week   Often skip meals, eat at random times, have no regular eating times. Almost Everyday   Rarely sit down for a meal but snack or graze throughout.  A Few Times a Week   Eat extra snacks between meals. Everyday   Eat most of my food at the end of the day. A Few Times a Week   Eat in the middle of the night or wake up at night to eat. Never   Eat extra snacks to prevent or correct low blood sugar. Never   Eat to prevent acid reflux or stomach pain. Never   Worry about not having enough food to eat. Never   Have you been to the food shelf at least a few times this year? No   I eat when I am depressed. Everyday   I eat when I am stressed. Everyday   I eat when I am bored. Everyday   I eat when I am anxious.  Everyday   I eat when I am happy or as a reward. Never   I feel hungry all the time even if I just have eaten. Never   Feeling full is important to me. Never   I finish all the food on my plate even if I am already full. Less Than Weekly   I can't resist eating delicious food or walk past the good food/smell. A Few Times a Week   I eat/snack without noticing that I am eating. Almost Everyday   I eat when I am preparing the meal. Never   I eat more than usual when I see others eating. Never   I have trouble not eating sweets, ice cream, cookies, or chips if they are around the house. Almost Everyday   I think about food all day. Everyday   What foods, if any, do you crave? Sweets/Candy/Chocolate       Activity/Exercise History 12/7/2022   How much of a typical 12 hour day do you spend sitting? Most of the Day   How much of a typical 12 hour day do you spend lying down? Less Than Half the Day   How much of a typical day do you spend walking/standing? Less Than Half the Day   How many hours (not including work) do you spend on the TV/Video Games/Computer/Tablet/Phone? 2-3 Hours   How many times a week are you active for the purpose of exercise? Never   What keeps you from being more active? Pain, Shortness of Breath, Too tired, Worried People Will Look At Me   How many total minutes do you spend doing some activity for the purpose of exercising when you exercise? 15-30 Minutes       PAST MEDICAL HISTORY:  Past Medical History:   Diagnosis Date     Depressive disorder      Essential hypertension 9/20/2022       Work/Social History Reviewed With Patient 12/7/2022   My employment status is: Full-Time   My job is:    How much of your job is spent on the computer or phone? 100%   How many hours do you spend commuting to work daily?  To work 30 mins   What is your marital status? /In a Relationship   If in a relationship, is your significant other overweight? Yes   Do you have children? Yes   If  "you have children, are they overweight? No   Who do you live with?  , 3 children and mother in law   Are they supportive of your health goals? Yes   Who does the food shopping?         Mental Health History Reviewed With Patient 12/7/2022   Have you ever been physically or sexually abused? No   If yes, do you feel that the abuse is affecting your weight? N/A   If yes, would you like to talk to a counselor about the abuse? N/A   How often in the past 2 weeks have you felt little interest or pleasure in doing things? Not at all   Over the past 2 weeks how often have you felt down, depressed, or hopeless? For Several Days       Sleep History Reviewed With Patient 12/7/2022   How many hours do you sleep at night? 8   Do you think that you snore loudly or has anybody ever heard you snore loudly (louder than talking or so loud it can be heard behind a shut door)? Yes   Has anyone seen or heard you stop breathing during your sleep? Yes   Do you often feel tired, fatigued, or sleepy during the day? Yes   Do you have a TV/Computer in your bedroom? No       MEDICATIONS:   Current Outpatient Medications   Medication Sig Dispense Refill     acetaminophen (TYLENOL) 325 MG tablet Take 325-650 mg by mouth       amLODIPine (NORVASC) 2.5 MG tablet Take 1 tablet (2.5 mg) by mouth daily 90 tablet 3     FLUoxetine (PROZAC) 20 MG capsule Take 1 capsule (20 mg) by mouth daily Take with 40 for total of 60       FLUoxetine (PROZAC) 40 MG capsule Take 1 capsule (40 mg) by mouth daily         ALLERGIES:   No Known Allergies    PHYSICAL EXAM:  /81 (BP Location: Left arm, Patient Position: Sitting, Cuff Size: Adult Large)   Pulse 93   Ht 1.727 m (5' 8\")   Wt 125.2 kg (276 lb)   SpO2 99%   BMI 41.97 kg/m     A & O x 3  HEENT: NCAT, mucous membranes moist  Respirations unlabored  Location of obesity: Mixed Obesity    ASSESSMENT:  Shala is a patient with mature onset morbid obesity with significant element of " familial/genetic influence and with current health consequences. She does need aggressive weight loss plan due to High Blood Pressure, GERD (Reflux).      Shala Poole eats a high carb diet, eats a high fat diet, eats fast food once or more per week and has sugar pop problem.    Her problem is complicated by strong craving/reward pathways and gender and short stature    Her ability to lose weight is impacted by lack of confidence.    PLAN:    Dietician visit of education  Volumetrics eating plan  Meal planning - focus on no between meal snacking, aggressive lowering of starches and cheese    Craving/Reward   Ancillary testing:  N/A.  Food Plan:  Volumetrics and High protein/low carbohydrate.   Activity Plan:  Activity journal.  Supplementary:  N/A.   Medication:  The patient will begin medication in pursuit of improved medical status as influenced by body weight. She will start Qsymia (phentermine and topiramate backup).  There is a mutual understanding of the goals and risks of this therapy. The patient is in agreement. She is educated on dosage regimen and possible side effects.    RTC:    12 weeks.  I spent 45 minutes with this patient face to face and explained the conditions and plans (more than 50% of time was counseling/coordination of weight management).    Sincerely,  Silviano Redd MD

## 2022-12-19 NOTE — LETTER
"2022       RE: Shala Poole  [address]  [place] [address]     Dear Colleague,    Thank you for referring your patient, Shala Poole, to the [facility] [place] at [facility]. Please see a copy of my visit note below.        New Medical Weight Management Consult    PATIENT:  Shala Poole  MRN:         [id number]  :         1980  DANNIELLE:         2022    Dear Kristen M. Kehr, PA-C,    I had the pleasure of seeing your patient, Shala Poole.  Full intake/assessment done to determine barriers to weight loss success and develop a treatment plan.  Shala Poole is a 42 year old female interested in treatment of medical problems associated with weight.  Her weight today is 276 lbs 0 oz, Body mass index is 41.97 kg/m ., and she has the following co-morbidities:     2022   I have the following health issues associated with obesity: High Blood Pressure, GERD (Reflux)   I have the following symptoms associated with obesity: Knee Pain, Depression, Back Pain, Fatigue, Hip Pain       Patient Goals 2022   I am interested in having a healthier weight to diminish current health problems: Yes   I am interested in having a healthier weight in order to prevent future health problems: Yes   I am interested in having a healthier weight in order to have a future surgery: No       Referring Provider 2022   Please name the provider who referred you to Medical Weight Management.  If you do not know, please answer: \"I Don't Know\". Kristen kehr       Wt Readings from Last 4 Encounters:   22 125.2 kg (276 lb)   10/18/22 124.3 kg (274 lb)   22 127.9 kg (282 lb)   22 128.4 kg (283 lb)       Weight History 2022   How concerned are you about your weight? Somewhat Concerned   Would you describe your weight gain as gradual? Yes   I became overweight: After Pregnancy   The " following factors have contributed to my weight gain:  Mental Health Issues, Eating Wrong Types of Food, Eating Too Much, Lack of Exercise, Genetic (Runs in the Family), Stress   I have tried the following methods to lose weight: Watching Portions or Calories, Exercise, Weight Watchers, Atkins-type Diet (Low Carb/High Protein), Meal Replacements, Fasting   My lowest weight since age 19 was: 170   My highest weight since age 19 was: 285   The most weight I have ever lost was: (lbs) 10   I have the following family history of obesity/being overweight:  My father is overweight, One or more of my siblings are overweight, Many of my relatives are overweight   Has anyone in your family had weight loss surgery? No   How has your weight changed over the last year?  Lost   How many pounds? 5-10lbs       Diet Recall 12/7/2022   Glass juice/day 0   Glass milk/day 0   Glass sugary drink/day 0   How many cans/bottles of sugar pop/soda/tea/sports drinks do you drink in a day? 3   Diet drink/day 2   Alcohol 2-4 Times a Month   Drinks/day 3-4       Eating Habits 12/7/2022   Generally, my meals include foods like these: bread, pasta, rice, potatoes, corn, crackers, sweet dessert, pop, or juice. Almost Everyday   Generally, my meals include foods like these: fried meats, brats, burgers, french fries, pizza, cheese, chips, or ice cream. A Few Times a Week   Eat fast food (like McDonalds, [employer], Taco Bell). A Few Times a Week   Eat at a buffet or sit-down restaurant. Less Than Weekly   Eat most of my meals in front of the TV or computer. A Few Times a Week   Often skip meals, eat at random times, have no regular eating times. Almost Everyday   Rarely sit down for a meal but snack or graze throughout.  A Few Times a Week   Eat extra snacks between meals. Everyday   Eat most of my food at the end of the day. A Few Times a Week   Eat in the middle of the night or wake up at night to eat. Never   Eat extra snacks to prevent or  correct low blood sugar. Never   Eat to prevent acid reflux or stomach pain. Never   Worry about not having enough food to eat. Never   Have you been to the food shelf at least a few times this year? No   I eat when I am depressed. Everyday   I eat when I am stressed. Everyday   I eat when I am bored. Everyday   I eat when I am anxious. Everyday   I eat when I am happy or as a reward. Never   I feel hungry all the time even if I just have eaten. Never   Feeling full is important to me. Never   I finish all the food on my plate even if I am already full. Less Than Weekly   I can't resist eating delicious food or walk past the good food/smell. A Few Times a Week   I eat/snack without noticing that I am eating. Almost Everyday   I eat when I am preparing the meal. Never   I eat more than usual when I see others eating. Never   I have trouble not eating sweets, ice cream, cookies, or chips if they are around the house. Almost Everyday   I think about food all day. Everyday   What foods, if any, do you crave? Sweets/Candy/Chocolate       Activity/Exercise History 12/7/2022   How much of a typical 12 hour day do you spend sitting? Most of the Day   How much of a typical 12 hour day do you spend lying down? Less Than Half the Day   How much of a typical day do you spend walking/standing? Less Than Half the Day   How many hours (not including work) do you spend on the TV/Video Games/Computer/Tablet/Phone? 2-3 Hours   How many times a week are you active for the purpose of exercise? Never   What keeps you from being more active? Pain, Shortness of Breath, Too tired, Worried People Will Look At Me   How many total minutes do you spend doing some activity for the purpose of exercising when you exercise? 15-30 Minutes       PAST MEDICAL HISTORY:  Past Medical History:   Diagnosis Date     Depressive disorder      Essential hypertension 9/20/2022       Work/Social History Reviewed With Patient 12/7/2022   My employment status  is: Full-Time   My job is:    How much of your job is spent on the computer or phone? 100%   How many hours do you spend commuting to work daily?  To work 30 mins   What is your marital status? /In a Relationship   If in a relationship, is your significant other overweight? Yes   Do you have children? Yes   If you have children, are they overweight? No   Who do you live with?  , 3 children and mother in law   Are they supportive of your health goals? Yes   Who does the food shopping?         Mental Health History Reviewed With Patient 12/7/2022   Have you ever been physically or sexually abused? No   If yes, do you feel that the abuse is affecting your weight? N/A   If yes, would you like to talk to a counselor about the abuse? N/A   How often in the past 2 weeks have you felt little interest or pleasure in doing things? Not at all   Over the past 2 weeks how often have you felt down, depressed, or hopeless? For Several Days       Sleep History Reviewed With Patient 12/7/2022   How many hours do you sleep at night? 8   Do you think that you snore loudly or has anybody ever heard you snore loudly (louder than talking or so loud it can be heard behind a shut door)? Yes   Has anyone seen or heard you stop breathing during your sleep? Yes   Do you often feel tired, fatigued, or sleepy during the day? Yes   Do you have a TV/Computer in your bedroom? No       MEDICATIONS:   Current Outpatient Medications   Medication Sig Dispense Refill     acetaminophen (TYLENOL) 325 MG tablet Take 325-650 mg by mouth       amLODIPine (NORVASC) 2.5 MG tablet Take 1 tablet (2.5 mg) by mouth daily 90 tablet 3     FLUoxetine (PROZAC) 20 MG capsule Take 1 capsule (20 mg) by mouth daily Take with 40 for total of 60       FLUoxetine (PROZAC) 40 MG capsule Take 1 capsule (40 mg) by mouth daily         ALLERGIES:   No Known Allergies    PHYSICAL EXAM:  /81 (BP Location: Left arm, Patient Position:  "Sitting, Cuff Size: Adult Large)   Pulse 93   Ht 1.727 m (5' 8\")   Wt 125.2 kg (276 lb)   SpO2 99%   BMI 41.97 kg/m     A & O x 3  HEENT: NCAT, mucous membranes moist  Respirations unlabored  Location of obesity: Mixed Obesity    ASSESSMENT:  Shala is a patient with mature onset morbid obesity with significant element of familial/genetic influence and with current health consequences. She does need aggressive weight loss plan due to High Blood Pressure, GERD (Reflux).      Shala Poole eats a high carb diet, eats a high fat diet, eats fast food once or more per week and has sugar pop problem.    Her problem is complicated by strong craving/reward pathways and gender and short stature    Her ability to lose weight is impacted by lack of confidence.    PLAN:    Dietician visit of education  Volumetrics eating plan  Meal planning - focus on no between meal snacking, aggressive lowering of starches and cheese    Craving/Reward   Ancillary testing:  N/A.  Food Plan:  Volumetrics and High protein/low carbohydrate.   Activity Plan:  Activity journal.  Supplementary:  N/A.   Medication:  The patient will begin medication in pursuit of improved medical status as influenced by body weight. She will start Qsymia (phentermine and topiramate backup).  There is a mutual understanding of the goals and risks of this therapy. The patient is in agreement. She is educated on dosage regimen and possible side effects.    RTC:    12 weeks.  I spent 45 minutes with this patient face to face and explained the conditions and plans (more than 50% of time was counseling/coordination of weight management).    Sincerely,  Silviano Redd MD  "

## 2022-12-19 NOTE — NURSING NOTE
"(   Chief Complaint   Patient presents with     New Patient     New MWM    )    ( Weight: 125.2 kg (276 lb) )  ( Height: 172.7 cm (5' 8\") )  ( BMI (Calculated): 41.97 )  (   )  (   )  (   )  (   )  (   )  (   )    ( BP: 128/81 )  (   )  (   )  (   )  ( Pulse: 93 )  (   )  ( SpO2: 99 % )    (   Patient Active Problem List   Diagnosis     Major depressive disorder, single episode, mild (H)     CARDIOVASCULAR SCREENING; LDL GOAL LESS THAN 160     Fatigue, unspecified type     Hearing aid worn     Sensorineural hearing loss (SNHL) of both ears     Morbid obesity (H)     Essential hypertension    )  (   Current Outpatient Medications   Medication Sig Dispense Refill     acetaminophen (TYLENOL) 325 MG tablet Take 325-650 mg by mouth       amLODIPine (NORVASC) 2.5 MG tablet Take 1 tablet (2.5 mg) by mouth daily 90 tablet 3     FLUoxetine (PROZAC) 20 MG capsule Take 1 capsule (20 mg) by mouth daily Take with 40 for total of 60       FLUoxetine (PROZAC) 40 MG capsule Take 1 capsule (40 mg) by mouth daily      )  ( Diabetes Eval:    )    ( Pain Eval:  No Pain (0) )    ( Wound Eval:       )    (   History   Smoking Status     Never   Smokeless Tobacco     Never    )    ( Signed By:  Sallie Zee, EMT; December 19, 2022; 8:31 AM )    "

## 2022-12-19 NOTE — PATIENT INSTRUCTIONS
Nutrition Goals  1) Aim for 3 meals/day and less snacking     2) Develop boredom eating strategies     Resources:   Plate method can be used for general guidance on balanced meals/portion sizes (see link below for picture/more information)                Make 1/2 your plate non starchy vegetables (cauliflower, broccoli, asparagus, [place] sprouts, lettuce, carrots, for example).                3+ oz lean protein sources (salmon/skinless chicken/turkey breast/pork loin/lean cuts of beef/ or non-animal protein such as black, kidney or holland beans, tofu/edamame/tempeh)    (Note: 3 oz = deck of cards size)                1/2 to 1 cup carbohydrate choices such as whole grain starches or starchy vegetables or fruit. For example: quinoa, brown rice, barley, potatoes, sweet potatoes, winter squash, peas, corn, or fruit.              Choose ~0-2 added fat servings at a meal (avocados, nut butters, nuts or seeds, olive oil, vegetable oils).    The Plate Method:  https://www.cdc.gov/diabetes/images/managing/Diabetes-Manage-Eat-Well-Plate-Graphic_600px.jpg    Types of fat  https://www.[facility].[place].edu/nutritionsource/what-should-you-eat/fats-and-cholesterol/types-of-fat/     Protein Sources   [email or web address]     Carbohydrates  [email or web address]     Mindful Eating  [email or web address]     Summary of Volumetrics Eating Plan  [email or web address]     Frozen Meal Replacement Examples:  Healthy Choice  Lean Cuisine  Atkins Meals  Smart Ones    AILYN Aleman, RD, LD  Clinic #: [phone]

## 2022-12-19 NOTE — PROGRESS NOTES
"New Weight Management Nutrition Consultation    Shala Poole is a 42 year old female presents today for new weight management nutrition consultation.  Patient referred by Dr. Redd on December 19, 2022.    Patient with Co-morbidities of obesity including:  Type II DM no  Renal Failure no  Sleep apnea no  Hypertension yes  Dyslipidemia no  Joint pain yes - hip and knee per pt  Back pain yes  GERD yes    PMH: depression, fatigue    Anthropometrics:  Weight 12/19/22: 276 lbs, pt report    Estimated body mass index is 41.97 kg/m  as calculated from the following:    Height as of an earlier encounter on 12/19/22: 1.727 m (5' 8\").    Weight as of an earlier encounter on 12/19/22: 125.2 kg (276 lb).     Current weight: 276 lbs, pt report    Medications for Weight Loss:  Qsymia    Supplements:   Probiotics  Daily Vitamin     NUTRITION HISTORY  No dx food allergies  Reports getting sick from peanuts and some dairy (diarrhea/GI)      Previous weight loss attempts include: watching portions/kcal, exercise, WW, atkins type diet, meal replacements, fasting     Has never met with a RD before.    Pt goals: weight loss, help with nutrition     Typically eats 2 meals/day and snacks  Example foods  L - fast food, frozen pizza  D - meat, potatoes, and pasta  Snacks - chips/cheese, cookies, beef jerky, popcorn, cheese sticks    Fluids: water, tea occ, 3 cans/day pop on average (mtn dew, coke)   - Feels she likely doesn't get enough fluids    PA: cleaning house, walking around track at [employer], walking at work   - No intentional activity per pt   Main barriers currently: weather  Does have a treadmill at home she wants to start using    Barriers: meal prep (doesn't enjoy), boredom (boredom eating)   - Wants faster, easier meal ideas     Additional information:  FT -     Lives with , 3 children and MIL    Learning type: visual    Nutrition Prescription  Recommended energy/nutrient " modification.    Nutrition Diagnosis  Obesity r/t long history of positive energy balance aeb BMI >30.    Nutrition Intervention  Reviewed current dietary habits and pts history   Discussed long-term goals pt hopes to accomplish in RD appointments  Reviewed and modified previous goals  Answered pt questions  Coordination of care   Nutrition education   AVS and handouts via [employer]    Patient demonstrates understanding.    Expected Engagement: good    Nutrition Goals  1) Aim for 3 meals/day and less snacking     2) Develop boredom eating strategies     Resources:   Plate method can be used for general guidance on balanced meals/portion sizes (see link below for picture/more information)                Make 1/2 your plate non starchy vegetables (cauliflower, broccoli, asparagus, Dana sprouts, lettuce, carrots, for example).                3+ oz lean protein sources (salmon/skinless chicken/turkey breast/pork loin/lean cuts of beef/ or non-animal protein such as black, kidney or holland beans, tofu/edamame/tempeh)    (Note: 3 oz = deck of cards size)                1/2 to 1 cup carbohydrate choices such as whole grain starches or starchy vegetables or fruit. For example: quinoa, brown rice, barley, potatoes, sweet potatoes, winter squash, peas, corn, or fruit.              Choose ~0-2 added fat servings at a meal (avocados, nut butters, nuts or seeds, olive oil, vegetable oils).    The Plate Method:  https://www.cdc.gov/diabetes/images/managing/Diabetes-Manage-Eat-Well-Plate-Graphic_600px.jpg    Types of fat  https://www.[facility].[employer].edu/nutritionsource/what-should-you-eat/fats-and-cholesterol/types-of-fat/     Protein Sources   [email or web address]     Carbohydrates  [email or web address]     Mindful Eating  [email or web address]     Summary of Volumetrics Eating Plan  [email or web address]     Frozen Meal Replacement Examples:  Healthy Choice  Lean Cuisine  Atkins Meals  Smart  Ones      Follow-Up:  1 month    Time spent with patient: 31 minutes.  AILYN Contreras, RD, LD

## 2022-12-20 ENCOUNTER — TELEPHONE (OUTPATIENT)
Dept: ENDOCRINOLOGY | Facility: CLINIC | Age: 42
End: 2022-12-20

## 2022-12-20 DIAGNOSIS — E66.01 MORBID OBESITY (H): Primary | ICD-10-CM

## 2022-12-20 RX ORDER — PHENTERMINE HYDROCHLORIDE 15 MG/1
15 CAPSULE ORAL EVERY MORNING
Qty: 30 CAPSULE | Refills: 5 | Status: SHIPPED | OUTPATIENT
Start: 2022-12-20 | End: 2023-08-07

## 2022-12-20 RX ORDER — TOPIRAMATE 25 MG/1
TABLET, FILM COATED ORAL
Qty: 90 TABLET | Refills: 5 | Status: SHIPPED | OUTPATIENT
Start: 2022-12-20 | End: 2023-06-02

## 2022-12-20 NOTE — TELEPHONE ENCOUNTER
Qsymia denied by insurance. Per Dr. Redd, will split into generics. Routed to available provider for sign off.

## 2022-12-20 NOTE — TELEPHONE ENCOUNTER
Central Prior Authorization Team   Phone: [phone]      PRIOR AUTHORIZATION DENIED    Medication: Phentermine-Topiramate 7.5-46 MG CP24 - EPA DENIED    Denial Date: 12/19/2022    Denial Rational:       Appeal Information:

## 2022-12-21 ENCOUNTER — TELEPHONE (OUTPATIENT)
Dept: ENDOCRINOLOGY | Facility: CLINIC | Age: 42
End: 2022-12-21

## 2022-12-21 NOTE — TELEPHONE ENCOUNTER
Prior Authorization Retail Medication Request    Medication/Dose: Phentermine  ICD code (if different than what is on RX):  Morbid obesity (H) [E66.01]  - Primary     Previously Tried and Failed:  Watching Portions or Calories, Exercise, Weight Watchers, Atkins-type Diet (Low Carb/High Protein), Meal Replacements, Fasting    Rationale:  I  had the pleasure of seeing your patient, Shala Poole.  Full intake/assessment done to determine barriers to weight loss success and develop a treatment plan.  Shala Poole is a 42 year old female interested in treatment of medical problems associated with weight.  Her weight today is 276 lbs 0 oz, Body mass index is 41.97 kg/m ., and she has the following co-morbidities: High Blood Pressure, GERD (Reflux), Knee Pain, Depression, Back Pain, Fatigue, Hip Pain.     Shala Poole eats a high carb diet, eats a high fat diet, eats fast food once or more per week and has sugar pop problem.     Her problem is complicated by strong craving/reward pathways and gender and short stature     Her ability to lose weight is impacted by lack of confidence.    Insurance Name:    Insurance ID:        Pharmacy Information (if different than what is on RX)  Name:  [facility] - [place], [place] - [address]  Phone:  [phone]

## 2022-12-23 ENCOUNTER — TELEPHONE (OUTPATIENT)
Dept: ENDOCRINOLOGY | Facility: CLINIC | Age: 42
End: 2022-12-23

## 2023-04-05 ENCOUNTER — MYC MEDICAL ADVICE (OUTPATIENT)
Dept: ENDOCRINOLOGY | Facility: CLINIC | Age: 43
End: 2023-04-05
Payer: COMMERCIAL

## 2023-04-06 NOTE — TELEPHONE ENCOUNTER
Attempted to contact patient regarding discontinuing Phentermine.  Left message.  Sent MyChart message as well.

## 2023-04-11 ENCOUNTER — MYC MEDICAL ADVICE (OUTPATIENT)
Dept: OTOLARYNGOLOGY | Facility: CLINIC | Age: 43
End: 2023-04-11

## 2023-04-11 DIAGNOSIS — H90.3 SENSORINEURAL HEARING LOSS (SNHL) OF BOTH EARS: Primary | ICD-10-CM

## 2023-04-19 ENCOUNTER — TELEPHONE (OUTPATIENT)
Dept: AUDIOLOGY | Facility: CLINIC | Age: 43
End: 2023-04-19
Payer: COMMERCIAL

## 2023-04-19 NOTE — TELEPHONE ENCOUNTER
Patient called back quickly after message. Spoke to patient, CI intake complete.    ---  LVM with name & direct # for callback regarding info for cochlear implant eval.    -Marely Banegas 4/19/23      CI Eval Intake    Referring Provider and clinic:  Sterling Santillan [facility]    Have you seen any other Audiologist or ENT provider: yes, Cori Arredondo 8/10/22    Do you wear hearing aids: yes, bilateral Phonak since July 2022    Have you ever had ear surgery: no     Have you had any imaging done of your head: no     Have you had an Audiogram done in the last 6 months: no     What is the best way to contact you- Phone, Mychart or email?  MyChart preferred    Is there someone we can contact on your behalf for communications assistance?:  Can contact      If we need you to sign a release of information to obtain your records would you prefer that be mailed to you or emailed?   [email or web address]

## 2023-05-02 ENCOUNTER — ALLIED HEALTH/NURSE VISIT (OUTPATIENT)
Dept: FAMILY MEDICINE | Facility: CLINIC | Age: 43
End: 2023-05-02
Payer: COMMERCIAL

## 2023-05-02 VITALS — SYSTOLIC BLOOD PRESSURE: 138 MMHG | DIASTOLIC BLOOD PRESSURE: 86 MMHG

## 2023-05-02 DIAGNOSIS — I10 ESSENTIAL HYPERTENSION: Primary | ICD-10-CM

## 2023-05-02 PROCEDURE — 99207 PR NO CHARGE NURSE ONLY: CPT

## 2023-05-02 NOTE — PROGRESS NOTES
I met with Shala Poole at the request of Kristen Kehr PA to recheck her blood pressure.  Blood pressure medications on the med list were reviewed with patient.    Patient has taken all medications as per usual regimen: Yes  Patient reports tolerating them without any issues or concerns: Yes    Vitals:    05/02/23 0859   BP: 138/86       Blood pressure was taken, previous encounter was reviewed, recorded blood pressure below 140/90.  Patient was discharged and the note will be sent to the provider for final review.     MARIA ISABEL Nuñez

## 2023-05-06 ENCOUNTER — HEALTH MAINTENANCE LETTER (OUTPATIENT)
Age: 43
End: 2023-05-06

## 2023-05-10 NOTE — TELEPHONE ENCOUNTER
FUTURE VISIT INFORMATION      FUTURE VISIT INFORMATION:    Date: 6/6/23    Time: 9:00am    Location: [facility] [place]  REFERRAL INFORMATION:    Referring provider:  Sterling Santillan     Referring providers clinic:  [facility]    Reason for visit/diagnosis  CIE    RECORDS REQUESTED FROM:       Clinic name Comments Records Status Imaging Status   [facility] Mychart/referral 4/11/23  Most recent audio 6/15/22  Audio 4/27/18 EPIC

## 2023-05-30 DIAGNOSIS — E66.01 MORBID OBESITY (H): ICD-10-CM

## 2023-06-02 RX ORDER — TOPIRAMATE 25 MG/1
TABLET, FILM COATED ORAL
Qty: 90 TABLET | Refills: 5 | OUTPATIENT
Start: 2023-06-02

## 2023-06-02 NOTE — TELEPHONE ENCOUNTER
topiramate (TOPAMAX) 25 MG tablet      Last Office Visit : 12/19/22  Future Office visit:  none    Recieved refill request for Topiramate. Patient needs appointment scheduled prior to any refills. Clinic Coordinator notified and will follow up with the patient as appropriate. The pharmacy has been notified that the medication will not be refilled prior to an appointment being scheduled.

## 2023-06-02 NOTE — TELEPHONE ENCOUNTER
Refill denied at this time. Patient needs appointment with Dr. Redd. Keeley message sent to patient to schedule appointment.

## 2023-06-06 ENCOUNTER — OFFICE VISIT (OUTPATIENT)
Dept: AUDIOLOGY | Facility: CLINIC | Age: 43
End: 2023-06-06
Attending: OTOLARYNGOLOGY
Payer: COMMERCIAL

## 2023-06-06 ENCOUNTER — PRE VISIT (OUTPATIENT)
Dept: AUDIOLOGY | Facility: CLINIC | Age: 43
End: 2023-06-06

## 2023-06-06 DIAGNOSIS — H90.3 SENSORINEURAL HEARING LOSS (SNHL) OF BOTH EARS: Primary | ICD-10-CM

## 2023-06-06 DIAGNOSIS — H90.3 SENSORINEURAL HEARING LOSS (SNHL) OF BOTH EARS: ICD-10-CM

## 2023-06-06 PROCEDURE — 92557 COMPREHENSIVE HEARING TEST: CPT | Performed by: AUDIOLOGIST

## 2023-06-06 PROCEDURE — 92626 EVAL AUD FUNCJ 1ST HOUR: CPT | Performed by: AUDIOLOGIST

## 2023-06-06 PROCEDURE — 92550 TYMPANOMETRY & REFLEX THRESH: CPT | Performed by: AUDIOLOGIST

## 2023-06-06 NOTE — PROGRESS NOTES
AUDIOLOGY REPORT    SUBJECTIVE:     Shala Poole 43 year old female  was seen in Audiology at [facility], on 6/6/2023 to assess audiologic candidacy for a cochlear implant, which was ordered by Sterling Santillan M.D. Shala has a diagnosis of severe sensorineural hearing loss in the right ear, and a moderate-severe sloping to severe sensorineural hearing loss in the left ear.    Onset of hearing loss: 25-26 years old  Identification of hearing loss: 28 years old  Etiology of hearing loss: Unknown, possibly hereditary  Sudden or Progressive: Progressive  Age Hearing Aid fit: 29 years old  Duration of Hearing Aid use: Consistent since first fit  Current Hearing Aid Type: Phonak Audeo P50-13T  Age of current Hearing Aid: ~ 1 year  Tinnitus: Present bilaterally  Balance: Occasional imbalance which is rare and brief in nature  Other:  No issues reported  Does patient exhibit intelligible vocalizations?  Yes  Primary Mode of Communication: Oral/aural/lipreading   Previous Surgeries: Hysterectomy in 2014  Previous Ear Surgeries: N/A    OBJECTIVE:    To evaluate candidacy for cochlear implant. Candidacy requires at least a moderate to profound sensorineural hearing loss in both ears in most cases; good general health; lack of benefit from conventional amplification as determined from the tests below,:psychological/emotional stability and motivationally suitable, with realistic expectations, and absence of medical conditions contraindicating surgical intervention.     Audiometric Results (see audiogram 6/6/23): Severe sensorineural hearing loss in the right ear. Moderately-severe to severe sensorineural hearing loss in the left ear    Otoscopy revealed ears are clear of cerumen bilaterally.     Distortion product otoacoustic emissions (DPOAEs) were performed from 2,000-5000 Hz.  Right Ear: Absent  Left Ear: Absent    Device(s) used for Aided Testing:   Right ear: Phonak Audeo P50-13T  Left  ear: Xoftak Advanced Battery Conceptseo P50-13T    Electroacoustic Test Results: Electroacoustic analysis revealed patient's hearing aids were functioning appropriately and up to 's specifications. Patient's personal hearing aids were meeting NAL-NL1 prescriptive targets when assessed using simulated real-ear measurements therefore were used for all testing.    45 minutes were spent assessing the patient's auditory rehabilitation status in order to determine whether conventional amplification is beneficial or whether the patient is an audiologic candidate for a cochlear implant.      Soundfield Thresholds (see audiogram): Aided thresholds in mild to moderately-severe hearing loss range with left hearing aid and in mild to moderate hearing loss range with right.    CNC Words Test: The patient repeats 50 single syllable words, auditory only. The words are presented at 60 dB SPL (conversational level) through a recording.     Left ear aided: words: 52%, phonemes: 65%  Right ear aided: words: 48%, phonemes: 77%    AzBio Sentences Test: The patient repeats 20 sentences, auditory only.  The sentences are presented at 60 dB SPL (conversational level) delivered through a recording.       Left ear aided: 79%, +10 dB SNR: 61%  Right ear aided: 82%, +10 dB SNR: 56%  Bilaterally aided: 90%, +10 dB SNR: 80%    ASSESSMENT:   Ear recommended for implantation: neither. Patient is not meeting FDA/Medicare candidacy criteria as patient's performance is currently better than candidacy critieria.      We discussed FDA and Medicare candidacy guidelines and that Shala is not meeting criteria. Other items discussed included:    -Cochlear implant systems including the internal and external components; how cochlear implants work and function differently than hearing aids and the the differences between acoustic and electric hearing.       PLAN: Shala has been diagnosed with a bilateral sensorineural hearing loss and is not a candidate for  cochlear implantation at this time. It is recommended that Shala return to their managing audiologist for hearing aid care. Please contact the clinic at [phone] with questions regarding these results or recommendations.    Aleksandra Diallo, [facility]  Licensed Audiologist  [place] License #[id number]

## 2023-06-06 NOTE — PROGRESS NOTES
AUDIOLOGY REPORT    SUMMARY: Audiology visit completed. See audiogram for results.      RECOMMENDATIONS: Follow-up with ENT.      Neal Fajardo, CCC-A  Licensed Audiologist  [place] #[id number]

## 2023-07-31 ASSESSMENT — ENCOUNTER SYMPTOMS
HEARTBURN: 0
ABDOMINAL PAIN: 0
PALPITATIONS: 0
CONSTIPATION: 0
ARTHRALGIAS: 1
CHILLS: 0
BREAST MASS: 0
NAUSEA: 0
HEADACHES: 0
NERVOUS/ANXIOUS: 1
DIZZINESS: 0
HEMATOCHEZIA: 0
JOINT SWELLING: 0
WEAKNESS: 0
DIARRHEA: 0
FEVER: 0
HEMATURIA: 0
DYSURIA: 0
MYALGIAS: 1
FREQUENCY: 0
COUGH: 0
SORE THROAT: 0
EYE PAIN: 0
PARESTHESIAS: 0
SHORTNESS OF BREATH: 0

## 2023-08-06 ASSESSMENT — PATIENT HEALTH QUESTIONNAIRE - PHQ9
SUM OF ALL RESPONSES TO PHQ QUESTIONS 1-9: 0
SUM OF ALL RESPONSES TO PHQ QUESTIONS 1-9: 0
10. IF YOU CHECKED OFF ANY PROBLEMS, HOW DIFFICULT HAVE THESE PROBLEMS MADE IT FOR YOU TO DO YOUR WORK, TAKE CARE OF THINGS AT HOME, OR GET ALONG WITH OTHER PEOPLE: NOT DIFFICULT AT ALL

## 2023-08-07 ENCOUNTER — OFFICE VISIT (OUTPATIENT)
Dept: FAMILY MEDICINE | Facility: CLINIC | Age: 43
End: 2023-08-07
Payer: COMMERCIAL

## 2023-08-07 VITALS
DIASTOLIC BLOOD PRESSURE: 82 MMHG | TEMPERATURE: 98.4 F | HEIGHT: 69 IN | RESPIRATION RATE: 18 BRPM | HEART RATE: 94 BPM | WEIGHT: 271 LBS | OXYGEN SATURATION: 99 % | SYSTOLIC BLOOD PRESSURE: 127 MMHG | BODY MASS INDEX: 40.14 KG/M2

## 2023-08-07 DIAGNOSIS — F32.0 MAJOR DEPRESSIVE DISORDER, SINGLE EPISODE, MILD (H): ICD-10-CM

## 2023-08-07 DIAGNOSIS — E66.01 MORBID OBESITY (H): ICD-10-CM

## 2023-08-07 DIAGNOSIS — Z12.31 VISIT FOR SCREENING MAMMOGRAM: ICD-10-CM

## 2023-08-07 DIAGNOSIS — I10 ESSENTIAL HYPERTENSION: ICD-10-CM

## 2023-08-07 DIAGNOSIS — Z00.00 ROUTINE GENERAL MEDICAL EXAMINATION AT A HEALTH CARE FACILITY: Primary | ICD-10-CM

## 2023-08-07 PROCEDURE — 99396 PREV VISIT EST AGE 40-64: CPT | Performed by: PHYSICIAN ASSISTANT

## 2023-08-07 PROCEDURE — 99214 OFFICE O/P EST MOD 30 MIN: CPT | Mod: 25 | Performed by: PHYSICIAN ASSISTANT

## 2023-08-07 RX ORDER — AMLODIPINE BESYLATE 2.5 MG/1
2.5 TABLET ORAL DAILY
Qty: 90 TABLET | Refills: 3 | Status: SHIPPED | OUTPATIENT
Start: 2023-08-07 | End: 2024-08-13

## 2023-08-07 RX ORDER — FLUOXETINE 40 MG/1
40 CAPSULE ORAL DAILY
Qty: 90 CAPSULE | Refills: 3 | Status: SHIPPED | OUTPATIENT
Start: 2023-08-07 | End: 2024-08-13

## 2023-08-07 ASSESSMENT — ENCOUNTER SYMPTOMS
ARTHRALGIAS: 1
CHILLS: 0
EYE PAIN: 0
HEMATURIA: 0
FREQUENCY: 0
WEAKNESS: 0
NAUSEA: 0
HEARTBURN: 0
COUGH: 0
PARESTHESIAS: 0
JOINT SWELLING: 0
HEMATOCHEZIA: 0
DYSURIA: 0
DIARRHEA: 0
FEVER: 0
BREAST MASS: 0
MYALGIAS: 1
SHORTNESS OF BREATH: 0
PALPITATIONS: 0
NERVOUS/ANXIOUS: 1
DIZZINESS: 0
CONSTIPATION: 0
ABDOMINAL PAIN: 0
SORE THROAT: 0
HEADACHES: 0

## 2023-08-07 ASSESSMENT — PAIN SCALES - GENERAL: PAINLEVEL: NO PAIN (0)

## 2023-08-07 NOTE — NURSING NOTE
"Chief Complaint   Patient presents with    Physical       Initial /82   Pulse 94   Temp 98.4  F (36.9  C) (Tympanic)   Resp 18   Ht 1.74 m (5' 8.5\")   Wt 122.9 kg (271 lb)   SpO2 99%   BMI 40.61 kg/m   Estimated body mass index is 40.61 kg/m  as calculated from the following:    Height as of this encounter: 1.74 m (5' 8.5\").    Weight as of this encounter: 122.9 kg (271 lb).  Medication Reconciliation: complete    KENNETH Chapman MA    "

## 2023-08-07 NOTE — PROGRESS NOTES
SUBJECTIVE:   CC: Shala is an 43 year old who presents for preventive health visit.       8/7/2023     5:04 PM   Additional Questions   Roomed by Thomas VARGAS MA       Healthy Habits:     Getting at least 3 servings of Calcium per day:  Yes    Bi-annual eye exam:  Yes    Dental care twice a year:  Yes    Sleep apnea or symptoms of sleep apnea:  None    Diet:  Regular (no restrictions)    Frequency of exercise:  None    Taking medications regularly:  Yes    Medication side effects:  None      Today's PHQ-9 Score:       8/6/2023     5:37 PM   PHQ-9 SCORE   PHQ-9 Total Score MyChart 0   PHQ-9 Total Score 0                 PROBLEMS TO ADD ON...  Depression: managed with prozac 60 mg daily  Hypertension: managed with amlodipine 2.5 mg daliy  Have you ever done Advance Care Planning? (For example, a Health Directive, POLST, or a discussion with a medical provider or your loved ones about your wishes): No, advance care planning information given to patient to review.  Patient declined advance care planning discussion at this time.    Social History     Tobacco Use    Smoking status: Never    Smokeless tobacco: Never   Substance Use Topics    Alcohol use: Yes     Comment: Some weekends a couple drinks socially             7/31/2023     9:11 AM   Alcohol Use   Prescreen: >3 drinks/day or >7 drinks/week? No          No data to display              Reviewed orders with patient.  Reviewed health maintenance and updated orders accordingly - Yes  BP Readings from Last 3 Encounters:   08/07/23 127/82   05/02/23 138/86   12/19/22 128/81    Wt Readings from Last 3 Encounters:   08/07/23 122.9 kg (271 lb)   12/19/22 125.2 kg (276 lb)   10/18/22 124.3 kg (274 lb)                  Patient Active Problem List   Diagnosis    Major depressive disorder, single episode, mild (H)    CARDIOVASCULAR SCREENING; LDL GOAL LESS THAN 160    Fatigue, unspecified type    Hearing aid worn    Sensorineural hearing loss (SNHL) of both ears    Morbid  obesity (H)    Essential hypertension     Past Surgical History:   Procedure Laterality Date    DENTAL SURGERY  1999    wisdom teeth    HYSTERECTOMY, PAP NO LONGER INDICATED  12/18/2013    due to excessive bleeding - ovaries remain    ORTHOPEDIC SURGERY  2012    cyst removal from left wrist     SALPINGECTOMY  12/18/2013    ROBOTIC ASSISTED BILATERAL SALPINGECTOMY    TUBAL LIGATION  2007       Social History     Tobacco Use    Smoking status: Never    Smokeless tobacco: Never   Substance Use Topics    Alcohol use: Yes     Comment: Some weekends a couple drinks socially     Family History   Problem Relation Age of Onset    Other - See Comments Mother         Fibromyalga.     Thyroid Disease Mother     Depression Mother     Diabetes Father     Chronic Obstructive Pulmonary Disease Father     Asthma Father     Heart Failure Father     Obesity Father     Ovarian Cancer Maternal Grandfather     Diabetes Paternal Grandmother     Colon Cancer Other          Current Outpatient Medications   Medication Sig Dispense Refill    acetaminophen (TYLENOL) 325 MG tablet Take 325-650 mg by mouth      amLODIPine (NORVASC) 2.5 MG tablet Take 1 tablet (2.5 mg) by mouth daily 90 tablet 3    FLUoxetine (PROZAC) 20 MG capsule Take 1 capsule (20 mg) by mouth daily Take with 40 for total of 60 90 capsule 3    FLUoxetine (PROZAC) 40 MG capsule Take 1 capsule (40 mg) by mouth daily 90 capsule 3     No Known Allergies  Recent Labs   Lab Test 09/20/22  0802 04/16/22  1108 02/02/21  1724 04/13/19  0917   A1C  --  5.1 4.8  --    LDL  --  111* 109* 95   HDL  --  39* 37* 34*   TRIG  --  196* 296* 253*   ALT  --  23  --   --    CR 0.90 0.89 0.96  --    GFRESTIMATED 82 83 74  --    GFRESTBLACK  --   --  85  --    POTASSIUM 3.5 4.4 3.7  --    TSH 1.90  --  2.60  --         Breast Cancer Screening:    FHS-7:       3/30/2022    10:03 AM 4/4/2022    12:06 PM 7/31/2023     9:12 AM   Breast CA Risk Assessment (FHS-7)   Did any of your first-degree relatives  have breast or ovarian cancer? Unknown Unknown No   Did any of your relatives have bilateral breast cancer? No Unknown No   Did any man in your family have breast cancer? No No No   Did any woman in your family have breast and ovarian cancer? Yes No No   Did any woman in your family have breast cancer before age 50 y?  No No   Do you have 2 or more relatives with breast and/or ovarian cancer? No No No   Do you have 2 or more relatives with breast and/or bowel cancer? No Yes Yes       Mammogram Screening - Offered annual screening and updated Health Maintenance for mutual plan based on risk factor consideration  Pertinent mammograms are reviewed under the imaging tab.    History of abnormal Pap smear: Status post benign hysterectomy. Health Maintenance and Surgical History updated.     Reviewed and updated as needed this visit by clinical staff   Tobacco  Allergies  Meds  Problems  Med Hx  Surg Hx  Fam Hx          Reviewed and updated as needed this visit by Provider   Tobacco  Allergies  Meds  Problems  Med Hx  Surg Hx  Fam Hx         Past Medical History:   Diagnosis Date    Depressive disorder     Essential hypertension 9/20/2022      Past Surgical History:   Procedure Laterality Date    DENTAL SURGERY  1999    wisdom teeth    HYSTERECTOMY, PAP NO LONGER INDICATED  12/18/2013    due to excessive bleeding - ovaries remain    ORTHOPEDIC SURGERY  2012    cyst removal from left wrist     SALPINGECTOMY  12/18/2013    ROBOTIC ASSISTED BILATERAL SALPINGECTOMY    TUBAL LIGATION  2007     OB History   Obstetric Comments   3 children.  Vaginal.        Review of Systems   Constitutional:  Negative for chills and fever.   HENT:  Positive for hearing loss. Negative for congestion, ear pain and sore throat.    Eyes:  Negative for pain and visual disturbance.   Respiratory:  Negative for cough and shortness of breath.    Cardiovascular:  Negative for chest pain, palpitations and peripheral edema.  "  Gastrointestinal:  Negative for abdominal pain, constipation, diarrhea, heartburn, hematochezia and nausea.   Breasts:  Negative for tenderness, breast mass and discharge.   Genitourinary:  Negative for dysuria, frequency, genital sores, hematuria, pelvic pain, urgency, vaginal bleeding and vaginal discharge.   Musculoskeletal:  Positive for arthralgias and myalgias. Negative for joint swelling.   Skin:  Negative for rash.   Neurological:  Negative for dizziness, weakness, headaches and paresthesias.   Psychiatric/Behavioral:  Negative for mood changes. The patient is nervous/anxious.           OBJECTIVE:   /82   Pulse 94   Temp 98.4  F (36.9  C) (Tympanic)   Resp 18   Ht 1.74 m (5' 8.5\")   Wt 122.9 kg (271 lb)   SpO2 99%   BMI 40.61 kg/m    Physical Exam  GENERAL: healthy, alert and no distress  EYES: Eyes grossly normal to inspection, PERRL and conjunctivae and sclerae normal  HENT: ear canals and TM's normal, nose and mouth without ulcers or lesions  NECK: no adenopathy, no asymmetry, masses, or scars and thyroid normal to palpation  RESP: lungs clear to auscultation - no rales, rhonchi or wheezes  BREAST: normal without masses, tenderness or nipple discharge and no palpable axillary masses or adenopathy  CV: regular rate and rhythm, normal S1 S2, no S3 or S4, no murmur, click or rub, no peripheral edema and peripheral pulses strong  ABDOMEN: soft, nontender, no hepatosplenomegaly, no masses and bowel sounds normal  MS: no gross musculoskeletal defects noted, no edema  SKIN: no suspicious lesions or rashes  NEURO: Normal strength and tone, mentation intact and speech normal  PSYCH: mentation appears normal, affect normal/bright    Diagnostic Test Results:  Labs reviewed in Epic    ASSESSMENT/PLAN:       ICD-10-CM    1. Routine general medical examination at a health care facility  Z00.00       2. Essential hypertension  I10 amLODIPine (NORVASC) 2.5 MG tablet      3. Major depressive disorder, " "single episode, mild (H)  F32.0 FLUoxetine (PROZAC) 20 MG capsule     FLUoxetine (PROZAC) 40 MG capsule      4. Morbid obesity (H)  E66.01       5. Visit for screening mammogram  Z12.31 *MA Screening Digital Bilateral          Patient has been advised of split billing requirements and indicates understanding: yes      COUNSELING:  Reviewed preventive health counseling, as reflected in patient instructions       Regular exercise       Healthy diet/nutrition      BMI:   Estimated body mass index is 40.61 kg/m  as calculated from the following:    Height as of this encounter: 1.74 m (5' 8.5\").    Weight as of this encounter: 122.9 kg (271 lb).   Weight management plan: Patient referred to endocrine and/or weight management specialty      She reports that she has never smoked. She has never used smokeless tobacco.          Kristen M. Kehr, PA-C [facility]  "

## 2023-09-27 ENCOUNTER — IMMUNIZATION (OUTPATIENT)
Dept: FAMILY MEDICINE | Facility: CLINIC | Age: 43
End: 2023-09-27
Payer: COMMERCIAL

## 2023-09-27 DIAGNOSIS — Z23 NEED FOR PROPHYLACTIC VACCINATION AND INOCULATION AGAINST INFLUENZA: Primary | ICD-10-CM

## 2023-09-27 PROCEDURE — 99207 PR NO CHARGE NURSE ONLY: CPT

## 2023-09-27 PROCEDURE — 90471 IMMUNIZATION ADMIN: CPT

## 2023-09-27 PROCEDURE — 90686 IIV4 VACC NO PRSV 0.5 ML IM: CPT

## 2023-09-27 NOTE — PROGRESS NOTES
Prior to immunization administration, verified patients identity using patient s name and date of birth. Please see Immunization Activity for additional information.     Screening Questionnaire for Adult Immunization    Are you sick today?   No   Do you have allergies to medications, food, a vaccine component or latex?   No   Have you ever had a serious reaction after receiving a vaccination?   No   Do you have a long-term health problem with heart, lung, kidney, or metabolic disease (e.g., diabetes), asthma, a blood disorder, no spleen, complement component deficiency, a cochlear implant, or a spinal fluid leak?  Are you on long-term aspirin therapy?   No   Do you have cancer, leukemia, HIV/AIDS, or any other immune system problem?   No   Do you have a parent, brother, or sister with an immune system problem?   No   In the past 3 months, have you taken medications that affect  your immune system, such as prednisone, other steroids, or anticancer drugs; drugs for the treatment of rheumatoid arthritis, Crohn s disease, or psoriasis; or have you had radiation treatments?   No   Have you had a seizure, or a brain or other nervous system problem?   No   During the past year, have you received a transfusion of blood or blood    products, or been given immune (gamma) globulin or antiviral drug?   No   For women: Are you pregnant or is there a chance you could become       pregnant during the next month?   No   Have you received any vaccinations in the past 4 weeks?   No     Immunization questionnaire answers were all negative.    I have reviewed the following standing orders:   This patient is due and qualifies for the Influenza vaccine.    Click here for Influenza Vaccine Standing Order    I have reviewed the vaccines inclusion and exclusion criteria; No concerns regarding eligibility.     Patient instructed to remain in clinic for 15 minutes afterwards, and to report any adverse reactions.     Screening performed by  Kem URRUTIA LPN on 9/27/2023 at 4:13 PM.

## 2023-10-07 ENCOUNTER — HEALTH MAINTENANCE LETTER (OUTPATIENT)
Age: 43
End: 2023-10-07

## 2023-10-07 ENCOUNTER — ANCILLARY PROCEDURE (OUTPATIENT)
Dept: MAMMOGRAPHY | Facility: CLINIC | Age: 43
End: 2023-10-07
Attending: PHYSICIAN ASSISTANT
Payer: COMMERCIAL

## 2023-10-07 DIAGNOSIS — Z12.31 VISIT FOR SCREENING MAMMOGRAM: ICD-10-CM

## 2023-10-07 PROCEDURE — 77063 BREAST TOMOSYNTHESIS BI: CPT | Mod: TC | Performed by: STUDENT IN AN ORGANIZED HEALTH CARE EDUCATION/TRAINING PROGRAM

## 2023-10-07 PROCEDURE — 77067 SCR MAMMO BI INCL CAD: CPT | Mod: TC | Performed by: STUDENT IN AN ORGANIZED HEALTH CARE EDUCATION/TRAINING PROGRAM

## 2024-01-22 NOTE — TELEPHONE ENCOUNTER
Left message requesting a call back. The problem appears to be the earmold rather than the , and the earmold is old enough that the  no longer has the scan on file. It is recommended that the patient make a hearing aid check appointment to have a new impression taken so a new earmold can be ordered.    Neal Weston, CCC-GHADA  [place] Licensed Audiologist #[id number]  10/19/2021  
VIEW ALL

## 2024-01-26 ENCOUNTER — OFFICE VISIT (OUTPATIENT)
Dept: AUDIOLOGY | Facility: CLINIC | Age: 44
End: 2024-01-26
Payer: COMMERCIAL

## 2024-01-26 DIAGNOSIS — H90.3 BILATERAL SENSORINEURAL HEARING LOSS: Primary | ICD-10-CM

## 2024-01-26 PROCEDURE — V5299 HEARING SERVICE: HCPCS

## 2024-01-26 NOTE — PROGRESS NOTES
AUDIOLOGY REPORT: HEARING AID RECHECK    SUBJECTIVE: Shala Poole is a 43 year old female, :  1980, was seen in the Audiology Clinic at [facility] on 24 for a return check of their hearing aids.       Background:   Patient is here today with the complaint of earmold causing irritation within her ears and would like to try domes.    Procedures:     2P receivers with power domes connected to hearing aids. Hearing aids were connected to software where feedback manager was performed. Shala reports better fit and sound quality. Should would like trial the domes.   Right cshell wax grommet fell out. It will need to go in for repair; patient is understanding of the $100 charge    Plan:   Once right earmold arrives, please call patient for pick-up. She will be billed at time of pick-up. Patient will return as needed for hearing aid concerns.     NO CHARGE VISIT    Neal Muhammad, [facility]  Licensed Audiologist  [place] #[id number]  2024

## 2024-01-30 ENCOUNTER — E-VISIT (OUTPATIENT)
Dept: FAMILY MEDICINE | Facility: CLINIC | Age: 44
End: 2024-01-30
Payer: COMMERCIAL

## 2024-01-30 DIAGNOSIS — R21 RASH AND NONSPECIFIC SKIN ERUPTION: Primary | ICD-10-CM

## 2024-01-30 PROCEDURE — 99421 OL DIG E/M SVC 5-10 MIN: CPT | Performed by: PHYSICIAN ASSISTANT

## 2024-01-30 RX ORDER — TRIAMCINOLONE ACETONIDE 1 MG/G
CREAM TOPICAL 2 TIMES DAILY
Qty: 30 G | Refills: 1 | Status: SHIPPED | OUTPATIENT
Start: 2024-01-30 | End: 2024-02-26

## 2024-02-06 ENCOUNTER — TELEPHONE (OUTPATIENT)
Dept: AUDIOLOGY | Facility: CLINIC | Age: 44
End: 2024-02-06
Payer: COMMERCIAL

## 2024-02-06 DIAGNOSIS — H90.3 BILATERAL SENSORINEURAL HEARING LOSS: Primary | ICD-10-CM

## 2024-02-06 PROCEDURE — V5264 EAR MOLD/INSERT: HCPCS | Mod: RT

## 2024-02-06 NOTE — TELEPHONE ENCOUNTER
Right earmold arrived. New SN: [id number]. Re-make warranty good through 4/30/2024.     Patient called for pick-up     CHARGES: - earmold ($100)    Neal Muhammad, [facility]  Licensed Audiologist  [place] #[id number]      02/06/24

## 2024-02-07 ENCOUNTER — MYC MEDICAL ADVICE (OUTPATIENT)
Dept: FAMILY MEDICINE | Facility: CLINIC | Age: 44
End: 2024-02-07
Payer: COMMERCIAL

## 2024-02-07 DIAGNOSIS — R53.83 FATIGUE, UNSPECIFIED TYPE: ICD-10-CM

## 2024-02-07 DIAGNOSIS — E66.01 MORBID OBESITY (H): Primary | ICD-10-CM

## 2024-02-12 ASSESSMENT — SLEEP AND FATIGUE QUESTIONNAIRES
HOW LIKELY ARE YOU TO NOD OFF OR FALL ASLEEP WHILE SITTING AND READING: WOULD NEVER DOZE
HOW LIKELY ARE YOU TO NOD OFF OR FALL ASLEEP WHILE WATCHING TV: SLIGHT CHANCE OF DOZING
HOW LIKELY ARE YOU TO NOD OFF OR FALL ASLEEP WHEN YOU ARE A PASSENGER IN A CAR FOR AN HOUR WITHOUT A BREAK: SLIGHT CHANCE OF DOZING
HOW LIKELY ARE YOU TO NOD OFF OR FALL ASLEEP IN A CAR, WHILE STOPPED FOR A FEW MINUTES IN TRAFFIC: WOULD NEVER DOZE
HOW LIKELY ARE YOU TO NOD OFF OR FALL ASLEEP WHILE LYING DOWN TO REST IN THE AFTERNOON WHEN CIRCUMSTANCES PERMIT: SLIGHT CHANCE OF DOZING
HOW LIKELY ARE YOU TO NOD OFF OR FALL ASLEEP WHILE SITTING QUIETLY AFTER LUNCH WITHOUT ALCOHOL: SLIGHT CHANCE OF DOZING
HOW LIKELY ARE YOU TO NOD OFF OR FALL ASLEEP WHILE SITTING AND TALKING TO SOMEONE: WOULD NEVER DOZE
HOW LIKELY ARE YOU TO NOD OFF OR FALL ASLEEP WHILE SITTING INACTIVE IN A PUBLIC PLACE: WOULD NEVER DOZE

## 2024-02-14 PROBLEM — I10 ESSENTIAL HYPERTENSION: Chronic | Status: ACTIVE | Noted: 2022-09-20

## 2024-02-14 PROBLEM — E66.01 MORBID OBESITY (H): Chronic | Status: ACTIVE | Noted: 2021-03-29

## 2024-02-15 ENCOUNTER — OFFICE VISIT (OUTPATIENT)
Dept: SLEEP MEDICINE | Facility: CLINIC | Age: 44
End: 2024-02-15
Attending: PHYSICIAN ASSISTANT
Payer: COMMERCIAL

## 2024-02-15 VITALS
HEIGHT: 69 IN | BODY MASS INDEX: 41.74 KG/M2 | SYSTOLIC BLOOD PRESSURE: 136 MMHG | DIASTOLIC BLOOD PRESSURE: 85 MMHG | HEART RATE: 102 BPM | OXYGEN SATURATION: 96 % | RESPIRATION RATE: 16 BRPM | WEIGHT: 281.8 LBS

## 2024-02-15 DIAGNOSIS — I10 ESSENTIAL HYPERTENSION: Chronic | ICD-10-CM

## 2024-02-15 DIAGNOSIS — R53.83 FATIGUE, UNSPECIFIED TYPE: Primary | ICD-10-CM

## 2024-02-15 DIAGNOSIS — E66.01 MORBID OBESITY (H): Chronic | ICD-10-CM

## 2024-02-15 PROCEDURE — 99214 OFFICE O/P EST MOD 30 MIN: CPT | Performed by: INTERNAL MEDICINE

## 2024-02-15 NOTE — PROGRESS NOTES
Outpatient Sleep Medicine Consultation:      Name: Shala Poole MRN# [id number]   Age: 44 year old YOB: 1980     Date of Consultation: February 15, 2024  Consultation is requested by: Kristen M Kehr, PA-C  [id number] [place] [address] Kristen M Kehr  Primary care provider: Kehr, Kristen M       Reason for Sleep Consult:     Shala Poole is sent by Kristen M Kehr for a sleep consultation regarding fatigue.    Patient s Reason for visit  Shala Poole main reason for visit: Always wake up tired, dentist did sleep scan and thinks I have sleep apnea  Patient states problem(s) started: It's been on going  Shala WILLAM Poole's goals for this visit: See if anything can be done to help with health and not always feel tired.           Assessment and Plan:     Fatigue (ESS 4), snoring, occasional morning headaches, obesity, crowded oropharynx, large neck bruxism  Comorbid hypertension.   - Patient appears to be a good candidate for Home Sleep Test STOPBANG  3  -  If HSAT normal would recommend attended Polysomnogram. If mild obstructive sleep apnea would want mandibular advancement device. If moderate-severe would recommend CPAP     Sleep onset difficulties (BENJAMIN 14)  Suspect psychophysiologic insomnia comorbid to depression and complicated by delayed sleep phase on weekends. We discussed stimulus control, sleep restriction and regular wake times.       Summary Counseling:    Sleep Testing Reviewed  Treatment options for obstructive sleep apnea reviewed       I spent 15 minutes with patient including counseling, and 10 minutes with chart review, and documentation     CC: Kristen M Kehr          History of Present Illness:     Patient was initially seen 3/2021 for fatigue (ESS 7), snoring, occasional morning headaches (2/week), recent night sweats, obesity, crowded oropharynx. STOPBANG 1-3.   - Sleep onset difficulties, suspect psychophysiologic insomnia.      A Polysomnogram was  ordered but not completed     She did Read the book Say Good Night To Insomnia        SLEEP-WAKE SCHEDULE:     Work/School Days: Patient goes to school/work: Yes   Usually gets into bed at 10pm  Takes patient about 30+ mins to fall asleep  Has trouble falling asleep 2 nights per week due to an over-active mind    Wakes up in the middle of the night 2-3 times.  Wakes up due to Snorting self awake;Pain;Use the bathroom  She has trouble falling back asleep usually none times a week.   It usually takes 5-10 mins to get back to sleep  Patient is usually up at 6:45  Uses alarm: Yes    Weekends/Non-work Days/All Other Days:  Usually gets into bed at 11   Takes patient about 30+ to fall asleep  Patient is usually up at 9   Uses alarm: No    Sleep Need  Patient gets  7-8 hours sleep on average   Patient thinks she needs about 8+ sleep    Shala Poole prefers to sleep in this position(s): Side;Stomach   Patient states they do the following activities in bed: Read     Naps  Patient takes a purposeful nap 0 times a week    She feels better after a nap: No  She dozes off unintentionally 0 days per week  Patient has had a driving accident or near-miss due to sleepiness/drowsiness: No      SLEEP DISRUPTIONS:    Breathing/Snoring  Patient snores:Yes, not loud   Other people complain about her snoring: No  Patient has been told she stops breathing in her sleep:Yes  She has issues with the following: Morning headaches;Stuffy nose when you wake up    Movement:  Patient gets pain, discomfort, with an urge to move:  Yes   Denies restless leg syndrome   It happens when she is resting:  No  It happens more at night:  Yes  Patient has been told she kicks her legs at night:  Yes     Behaviors in Sleep:  Shalasherry Poole has experienced the following behaviors while sleeping:   Sleep-talking;  Sleepwalking; none recent (couple years)  Teeth grinding  She has experienced sudden muscle weakness during the day: No      Is there anything  else you would like your sleep provider to know: no      CAFFEINE AND OTHER SUBSTANCES:    Patient consumes caffeinated beverages per day:  1-2 16 oz sodas  Last caffeine use is usually: 2pm  List of any prescribed or over the counter stimulants that patient takes: none  List of any prescribed or over the counter sleep medication patient takes: none  List of previous sleep medications that patient has tried:    Patient drinks alcohol to help them sleep: No  Patient drinks alcohol near bedtime: No    Family History:  Patient has a family member been diagnosed with a sleep disorder: No            SCALES:    EPWORTH SLEEPINESS SCALE         2/15/2024     8:00 AM    [place] Sleepiness Scale ( LINETTE Beltrán  [id number]<br>ESS - [place]/English - Final version - 21 Nov 07 - [facility] [place].)   [place] Score (Sleep) 4         INSOMNIA SEVERITY INDEX (BENJAMIN)          2/15/2024     8:00 AM   Insomnia Severity Index (BENJAMIN)   Difficulty falling asleep 2   Difficulty staying asleep 1   Problems waking up too early 2   How SATISFIED/DISSATISFIED are you with your CURRENT sleep pattern? 3   How NOTICEABLE to others do you think your sleep problem is in terms of impairing the quality of your life? 1   How WORRIED/DISTRESSED are you about your current sleep problem? 2   To what extent do you consider your sleep problem to INTERFERE with your daily functioning (e.g. daytime fatigue, mood, ability to function at work/daily chores, concentration, memory, mood, etc.) CURRENTLY? 2   BENJAMIN Total Score 13       Guidelines for Scoring/Interpretation:  Total score categories:  0-7 = No clinically significant insomnia   8-14 = Subthreshold insomnia   15-21 = Clinical insomnia (moderate severity)  22-28 = Clinical insomnia (severe)  Used via courtesy of www.[employer].va.gov with permission from Silviano Barnett PhD., [facility]      Allergies:    Not on File    Medications:    Current Outpatient Medications   Medication Sig Dispense Refill     acetaminophen (TYLENOL) 325 MG tablet Take 325-650 mg by mouth      amLODIPine (NORVASC) 2.5 MG tablet Take 1 tablet (2.5 mg) by mouth daily 90 tablet 3    FLUoxetine (PROZAC) 20 MG capsule Take 1 capsule (20 mg) by mouth daily Take with 40 for total of 60 90 capsule 3    FLUoxetine (PROZAC) 40 MG capsule Take 1 capsule (40 mg) by mouth daily 90 capsule 3    triamcinolone (KENALOG) 0.1 % external cream Apply topically 2 times daily 30 g 1       Problem List:  Patient Active Problem List    Diagnosis Date Noted    Essential hypertension 09/20/2022     Priority: Medium    Major depressive disorder, single episode, mild (H24) 01/16/2017     Priority: Medium    Morbid obesity (H) 03/29/2021     Priority: Low    Fatigue, unspecified type 02/02/2021     Priority: Low    Hearing aid worn 02/02/2021     Priority: Low    CARDIOVASCULAR SCREENING; LDL GOAL LESS THAN 160 01/16/2017     Priority: Low    Sensorineural hearing loss (SNHL) of both ears 10/16/2012     Priority: Low     since a child. Mother has it too.          Past Medical/Surgical History:  Past Medical History:   Diagnosis Date    Benign essential hypertension     Depressive disorder      Past Surgical History:   Procedure Laterality Date    DENTAL SURGERY  1999    wisdom teeth    HYSTERECTOMY, PAP NO LONGER INDICATED  12/18/2013    due to excessive bleeding - ovaries remain    ORTHOPEDIC SURGERY  2012    cyst removal from left wrist     SALPINGECTOMY  12/18/2013    ROBOTIC ASSISTED BILATERAL SALPINGECTOMY    TUBAL LIGATION  2007       Social History:  Social History     Socioeconomic History    Marital status:      Spouse name: Not on file    Number of children: Not on file    Years of education: Not on file    Highest education level: Not on file   Occupational History    Occupation:  for [employer]   Tobacco Use    Smoking status: Never     Passive exposure: Never    Smokeless tobacco: Never   Vaping Use    Vaping Use: Never used    Substance and Sexual Activity    Alcohol use: Yes     Comment: Some weekends a couple drinks socially    Drug use: No    Sexual activity: Yes     Partners: Male     Birth control/protection: Female Surgical   Other Topics Concern    Parent/sibling w/ CABG, MI or angioplasty before 65F 55M? No   Social History Narrative    Not on file     Social Determinants of Health     Financial Resource Strain: Not on file   Food Insecurity: Not on file   Transportation Needs: Not on file   Physical Activity: Not on file   Stress: Not on file   Social Connections: Not on file   Interpersonal Safety: Not on file   Housing Stability: Not on file       Family History:  Family History   Problem Relation Age of Onset    Other - See Comments Mother         Fibromyalga.     Thyroid Disease Mother     Depression Mother     Diabetes Father     Chronic Obstructive Pulmonary Disease Father     Asthma Father     Heart Failure Father     Obesity Father     Ovarian Cancer Maternal Grandfather     Diabetes Paternal Grandmother     Colon Cancer Other        Review of Systems:  A complete review of systems reviewed by me is negative with the exeption of what has been mentioned in the history of present illness.  In the last TWO WEEKS have you experienced any of the following symptoms?  Fevers: No  Night Sweats: No  Weight Gain: No  Pain at Night: No  Double Vision: No  Changes in Vision: No  Difficulty Breathing through Nose: No  Sore Throat in Morning: No  Dry Mouth in the Morning: Yes  Shortness of Breath Lying Flat: Yes  Shortness of Breath With Activity: Yes  Awakening with Shortness of Breath: No  Increased Cough: No  Heart Racing at Night: Yes  Swelling in Feet or Legs: No  Diarrhea at Night: No  Heartburn at Night: No  Urinating More than Once at Night: No  Losing Control of Urine at Night: No  Joint Pains at Night: Yes  Headaches in Morning: Yes  Weakness in Arms or Legs: No  Depressed Mood: Yes  Anxiety: Yes     Physical  "Examination:  Vitals: /85   Pulse 102   Resp 16   Ht 1.74 m (5' 8.5\")   Wt 127.8 kg (281 lb 12.8 oz)   SpO2 96%   BMI 42.22 kg/m    BMI= Body mass index is 42.22 kg/m .    Neck Cir (cm): 42 cm      SpO2 Readings from Last 4 Encounters:   02/15/24 96%   08/07/23 99%   12/19/22 99%   10/18/22 99%       GENERAL APPEARANCE: alert and no distress  EYES: Eyes grossly normal to inspection  NECK: generous size  LUNGS: no shortness of breath , cough  NEURO: mentation intact, speech normal and cranial nerves 2-12 appear intact  PSYCH: affect normal/bright             Data: All pertinent previous laboratory data reviewed     Recent Labs   Lab Test 09/20/22  0802 04/16/22  1108    138   POTASSIUM 3.5 4.4   CHLORIDE 108 106   CO2 27 25   ANIONGAP 6 7   * 93   BUN 12 10   CR 0.90 0.89   DEBI 8.7 8.7       Recent Labs   Lab Test 09/20/22  0802   WBC 7.8   RBC 4.48   HGB 12.8   HCT 38.9   MCV 87   MCH 28.6   MCHC 32.9   RDW 13.9          Recent Labs   Lab Test 04/16/22  1108   PROTTOTAL 7.0   ALBUMIN 3.4   BILITOTAL 0.4   ALKPHOS 79   AST 18   ALT 23       TSH (mU/L)   Date Value   09/20/2022 1.90   02/02/2021 2.60         Mitchell Hernandez MD 2/15/2024           "

## 2024-02-15 NOTE — PATIENT INSTRUCTIONS

## 2024-02-15 NOTE — NURSING NOTE
HST pick-up, HST drop-off, and follow-up appointment with provider have all been scheduled. HST scheduling hand-out given to patient at clinic visit.  Nevaeh Jc, [facility]

## 2024-02-15 NOTE — NURSING NOTE
"Chief Complaint   Patient presents with    Sleep Problem     Patient presents to clinic for a consult on snoring. Was referred by Kristen Kehr PA-C.       Initial /85   Pulse 102   Resp 16   Ht 1.74 m (5' 8.5\")   Wt 127.8 kg (281 lb 12.8 oz)   SpO2 96%   BMI 42.22 kg/m   Estimated body mass index is 42.22 kg/m  as calculated from the following:    Height as of this encounter: 1.74 m (5' 8.5\").    Weight as of this encounter: 127.8 kg (281 lb 12.8 oz).    Medication Reconciliation: complete  ESS: 4  Neck circumference: 16.50 inches / 42 centimeters.  DME: N/A  Nevaeh Jc CMA      "

## 2024-02-24 DIAGNOSIS — R21 RASH AND NONSPECIFIC SKIN ERUPTION: ICD-10-CM

## 2024-02-26 RX ORDER — TRIAMCINOLONE ACETONIDE 1 MG/G
CREAM TOPICAL 2 TIMES DAILY
Qty: 30 G | Refills: 1 | Status: SHIPPED | OUTPATIENT
Start: 2024-02-26

## 2024-03-06 ENCOUNTER — OFFICE VISIT (OUTPATIENT)
Dept: AUDIOLOGY | Facility: CLINIC | Age: 44
End: 2024-03-06
Payer: COMMERCIAL

## 2024-03-06 DIAGNOSIS — H90.3 BILATERAL SENSORINEURAL HEARING LOSS: Primary | ICD-10-CM

## 2024-03-06 PROCEDURE — V5299 HEARING SERVICE: HCPCS

## 2024-03-06 NOTE — PROGRESS NOTES
AUDIOLOGY REPORT: HEARING AID RECHECK    SUBJECTIVE: Shala Poole is a 44 year old female, :  1980, was seen in the Audiology Clinic at [facility] on 3/06/24 for a return check of their hearing aids.       Background:   Patient is here today to discuss different accessories and possibly add a tinnitus masker to her hearing aids.     Procedures:       SIDE: Both    : Phonak    TYPE:  Audeo P50-13T    S/N: (R): [id number] & (L): [id number]     WARRANTY: 10/5/2022    Discussed different accessories including the Phonak TV streamer, Tulio and partner court. Shala is interested in both the TV streamer and partner court, but would like to trial the partner court first. She is understanding of the $480 charge.   Hearing aids connected to software where tinnitus program was turned on; patient heard no difference in sound. Tinnitus masker was increased to the maximum volume- Shala heard no difference. Different apps were discussed including the [employer] relief marlena where she can stream the 'masker' through her phone. She said she is willing to try that.     Plan:   Shala is not interested in a return appointment; she would like to set the partner court up herself. Once the device has arrived, please call patient for pick-up. Patient will return as needed for hearing aid concerns.     NO CHARGE VISIT    Neal Muhammad, [facility]  Licensed Audiologist  [place] #[id number]  2024

## 2024-03-11 ENCOUNTER — OFFICE VISIT (OUTPATIENT)
Dept: DERMATOLOGY | Facility: CLINIC | Age: 44
End: 2024-03-11
Attending: PHYSICIAN ASSISTANT
Payer: COMMERCIAL

## 2024-03-11 DIAGNOSIS — R21 RASH AND NONSPECIFIC SKIN ERUPTION: ICD-10-CM

## 2024-03-11 PROCEDURE — 99203 OFFICE O/P NEW LOW 30 MIN: CPT | Performed by: STUDENT IN AN ORGANIZED HEALTH CARE EDUCATION/TRAINING PROGRAM

## 2024-03-11 ASSESSMENT — PAIN SCALES - GENERAL: PAINLEVEL: NO PAIN (0)

## 2024-03-11 NOTE — PATIENT INSTRUCTIONS
Eczema Action Plan    Name: Shala Poole Provider: JANICE ROBERTS Date: 3/11/2024    Step 1: Eczema Under Control (Skin is soft and flexible, not red or itchy)  Moisturize the whole body at least two times a day.  Watch for signs that the skin is turning red, itchy, or dry.  Use a cream in a jar from the list below. Do not use lotions from a pump.  Suggested creams:  CeraVe Cream, Cetaphil Cream, Vanicream, Aquaphor, Vaseline  Use a gentle cleanser/soap in the bath/shower such as dove sensitive cleanser or Cetaphil cleanser only to the armpits and groin areas, or where you are visibly dirty. All other areas should get washed with water only. Do not scrub. After bathing pat the skin dry with a towel instead of rubbing dry. Apply your moisturizer while you're still slightly damp to lock in some of the moisture.     Step 2:  Eczema Flare (Eczema is out of control and not responding to maintenance care)       Continue above procedures  Also, use prescription triamcinolone two times a day for 3-4. Apply to red and itchy areas. Put the steroid on the skin first before other creams. Don't apply to face or skin folds.     Use one fingertip unit of the ointment for eczema. A fingertip unit is the amount of ointment from the first bend in finger to the fingertip. This amount will cover an area equal to two adult hands. Using steroids for extended periods of time can thin the skin and cause stretch marks. Never use for longer than 3-4 weeks before following up with your provider. Do not apply  to skin folds or face. Please follow up in clinic if rash persists and does not resolve with this regimen in 1 months time.     Moisturize your whole body two times a day with a suggested cream.    About Dry Skin    What is dry skin?  Common skin problem  Can be worse during the winter   Affects all ages  Occurs in people with or without other skin problems    What does it look like?  Fine lines in the skin become more visible    Rough feeling skin   Flaky skin  Most common on the arms and legs  Skin can become cracked, especially on the hands and feet    What are some problems caused by dry skin?   Itching  Rubbing or scratching can cause thickened, rough skin patches  Cracks in skin can be painful  Red, itchy, scaly skin (called eczema) can occur  Yellow crusting or pus could be signs of an infection    What causes dry skin?  A lack of water in the top layer of the skin  Too much soapy water,  hot water, or harsh chemicals  Aging and sun damage    How do I treat dry skin?  Shower or bathe daily for under ten minutes with lukewarm water and mild soap.  Pat yourself dry with a towel gently and leave your skin slightly damp.  Use moisturizing cream or ointment right away.  Avoid lotions.    What kind of mild soap should I be using?  Camay , Dove , Tone , Neutrogena , Purpose , or Oil of Olay   A non-detergent cleanser, like Cetaphil , can be used.    What should I stay away from?  Scented soaps   Bath oils    What moisturizers should I be using?  Cetaphil Cream,CeraVe Cream, Vanicream, Eucerin, Aquaphor, or Vaseline   Always apply after showering or bathing.  Reapply throughout the day, if possible.  If dry skin affects your hands, always reapply after handwashing.    What else should I know?  Using a humidifier during winter months may help.  If dry skin gets worse or if eczema develops, a steroid cream may be needed.

## 2024-03-11 NOTE — PROGRESS NOTES
[facility] Dermatology Note  Encounter Date: Mar 11, 2024  Office Visit     Reviewed patients past medical history and pertinent chart review prior to patients visit today.     Dermatology Problem List:  Dermatitis   -triamcinolone cream     ____________________________________________    Assessment & Plan:     # Dermatitis  -Continue triamcinolone 0.1% cream twice daily for 3-4 weeks, decreasing as patients rash improves, repeat cycle for flares. If not fully resolved in 1 month, after diligent use of topical steroid, patient advised to return to clinic for reevaluation. Discussed risk of skin atrophy with long term chronic use. Do not apply topical steroid to face, armpits or groin.   -When bathing, use gentle soap such as Dove for Sensitive Skin and lukewarm water on amrpits, groin, and other visibly dirty areas, all other areas let the water run over but no soap is necessary. Pat skin dry instead of vigorous rubbing. Encouraged regular use of an emollient such as Vanicream, Cera Ve Cream or Cetaphil Cream to the entire body.   -Start a humidifier in bedroom when sleeping if possible for patient. Clean regularly.     # lentigo, right forearm  - Benign, no further treatment needed. Continue with observation at this time.  If lesion changes in size, shape, color or develop symptoms please return to clinic for reevaluation.    Follow up: VIN Styles PA-C  [facility]  Dermatology    _______________________________________    CC: Rash (Has a history of a dry erythematous rash on bilateral elbows/arms.  Has tried an OTC antifungal and triamcinolone with no resolution. Has not previously seen dermatology - No personal or family history of skin cancers.)    HPI:  Ms. Shala Poole is a(n) 44 year old female who presents today as a new patient for for evaluation of a rash on the bilateral elbows and upper extremities.  This rash has been present for several months.  She has been  prescribed topical triamcinolone cream which has been helpful.  She is wondering if this is appropriate or if there are any other treatment recommendations.  Additionally, patient has lesion of concern on her right posterior forearm.  This lesion has been present for many years and is asymptomatic.  She like to make sure it is not of concern.    Patient is otherwise feeling well, without additional skin concerns.      Physical Exam:  SKIN: Focused examination of bilateral upper extremities and right posterior forearm was performed.  - bilateral upper extremities, erythematous plaques with overlying fine scale  -right posterior forearm, brown macule with reassuring features on dermoscopy.    - No other lesions of concern on areas examined.     Medications:  Current Outpatient Medications   Medication    acetaminophen (TYLENOL) 325 MG tablet    amLODIPine (NORVASC) 2.5 MG tablet    FLUoxetine (PROZAC) 20 MG capsule    FLUoxetine (PROZAC) 40 MG capsule    triamcinolone (KENALOG) 0.1 % external cream     No current facility-administered medications for this visit.      Past Medical History:   Patient Active Problem List   Diagnosis    Major depressive disorder, single episode, mild (H24)    CARDIOVASCULAR SCREENING; LDL GOAL LESS THAN 160    Fatigue, unspecified type    Hearing aid worn    Sensorineural hearing loss (SNHL) of both ears    Morbid obesity (H)    Essential hypertension     Past Medical History:   Diagnosis Date    Benign essential hypertension     Depressive disorder        CC Kristen M Kehr, PA-C  [address] [place] [address] on close of this encounter.

## 2024-03-11 NOTE — LETTER
3/11/2024         RE: Shala Poole  [address]  [place] [address]        Dear Colleague,    Thank you for referring your patient, Shala Poole, to the [facility]. Please see a copy of my visit note below.    [facility] Dermatology Note  Encounter Date: Mar 11, 2024  Office Visit     Reviewed patients past medical history and pertinent chart review prior to patients visit today.     Dermatology Problem List:  Dermatitis   -triamcinolone cream     ____________________________________________    Assessment & Plan:     # Dermatitis  -Continue triamcinolone 0.1% cream twice daily for 3-4 weeks, decreasing as patients rash improves, repeat cycle for flares. If not fully resolved in 1 month, after diligent use of topical steroid, patient advised to return to clinic for reevaluation. Discussed risk of skin atrophy with long term chronic use. Do not apply topical steroid to face, armpits or groin.   -When bathing, use gentle soap such as Dove for Sensitive Skin and lukewarm water on amrpits, groin, and other visibly dirty areas, all other areas let the water run over but no soap is necessary. Pat skin dry instead of vigorous rubbing. Encouraged regular use of an emollient such as Vanicream, Cera Ve Cream or Cetaphil Cream to the entire body.   -Start a humidifier in bedroom when sleeping if possible for patient. Clean regularly.     # lentigo, right forearm  - Benign, no further treatment needed. Continue with observation at this time.  If lesion changes in size, shape, color or develop symptoms please return to clinic for reevaluation.    Follow up: VIN Styles PA-C  [facility]  Dermatology    _______________________________________    CC: Rash (Has a history of a dry erythematous rash on bilateral elbows/arms.  Has tried an OTC antifungal and triamcinolone with no resolution. Has not previously seen dermatology - No personal or family  history of skin cancers.)    HPI:  Ms. Shala Poole is a(n) 44 year old female who presents today as a new patient for for evaluation of a rash on the bilateral elbows and upper extremities.  This rash has been present for several months.  She has been prescribed topical triamcinolone cream which has been helpful.  She is wondering if this is appropriate or if there are any other treatment recommendations.  Additionally, patient has lesion of concern on her right posterior forearm.  This lesion has been present for many years and is asymptomatic.  She like to make sure it is not of concern.    Patient is otherwise feeling well, without additional skin concerns.      Physical Exam:  SKIN: Focused examination of bilateral upper extremities and right posterior forearm was performed.  - bilateral upper extremities, erythematous plaques with overlying fine scale  -right posterior forearm, brown macule with reassuring features on dermoscopy.    - No other lesions of concern on areas examined.     Medications:  Current Outpatient Medications   Medication     acetaminophen (TYLENOL) 325 MG tablet     amLODIPine (NORVASC) 2.5 MG tablet     FLUoxetine (PROZAC) 20 MG capsule     FLUoxetine (PROZAC) 40 MG capsule     triamcinolone (KENALOG) 0.1 % external cream     No current facility-administered medications for this visit.      Past Medical History:   Patient Active Problem List   Diagnosis     Major depressive disorder, single episode, mild (H24)     CARDIOVASCULAR SCREENING; LDL GOAL LESS THAN 160     Fatigue, unspecified type     Hearing aid worn     Sensorineural hearing loss (SNHL) of both ears     Morbid obesity (H)     Essential hypertension     Past Medical History:   Diagnosis Date     Benign essential hypertension      Depressive disorder        CC Kristen M Kehr, PA-C  [address] [place] [address] on close of this encounter.       Again, thank you for allowing me to participate in the care of your  patient.        Sincerely,        Angeles Styles PA-C

## 2024-03-12 ENCOUNTER — TELEPHONE (OUTPATIENT)
Dept: AUDIOLOGY | Facility: CLINIC | Age: 44
End: 2024-03-12
Payer: COMMERCIAL

## 2024-03-12 DIAGNOSIS — H90.3 BILATERAL SENSORINEURAL HEARING LOSS: Primary | ICD-10-CM

## 2024-03-12 PROCEDURE — V5267 HEARING AID SUP/ACCESS/DEV: HCPCS

## 2024-03-12 NOTE — TELEPHONE ENCOUNTER
Partner court arrived. SN:[id number]. Warranty: 4/5/2025. Trial period through 6/10/2024    Shala is not interested in a return appointment; she would like to set the PartnerMic up herself. It was recommended she call/return for any questions or concerns. Patient called for pick-up    CHARGES: [id number]- hearing aid accessory ($480)    Neal Muhammad, [facility]  Licensed Audiologist  [place] #[id number]      03/12/24

## 2024-03-20 ASSESSMENT — SLEEP AND FATIGUE QUESTIONNAIRES
HOW LIKELY ARE YOU TO NOD OFF OR FALL ASLEEP IN A CAR, WHILE STOPPED FOR A FEW MINUTES IN TRAFFIC: WOULD NEVER DOZE
HOW LIKELY ARE YOU TO NOD OFF OR FALL ASLEEP WHILE SITTING INACTIVE IN A PUBLIC PLACE: WOULD NEVER DOZE
HOW LIKELY ARE YOU TO NOD OFF OR FALL ASLEEP WHILE SITTING QUIETLY AFTER LUNCH WITHOUT ALCOHOL: SLIGHT CHANCE OF DOZING
HOW LIKELY ARE YOU TO NOD OFF OR FALL ASLEEP WHILE WATCHING TV: SLIGHT CHANCE OF DOZING
HOW LIKELY ARE YOU TO NOD OFF OR FALL ASLEEP WHILE SITTING AND TALKING TO SOMEONE: WOULD NEVER DOZE
HOW LIKELY ARE YOU TO NOD OFF OR FALL ASLEEP WHEN YOU ARE A PASSENGER IN A CAR FOR AN HOUR WITHOUT A BREAK: MODERATE CHANCE OF DOZING
HOW LIKELY ARE YOU TO NOD OFF OR FALL ASLEEP WHILE SITTING AND READING: SLIGHT CHANCE OF DOZING
HOW LIKELY ARE YOU TO NOD OFF OR FALL ASLEEP WHILE LYING DOWN TO REST IN THE AFTERNOON WHEN CIRCUMSTANCES PERMIT: MODERATE CHANCE OF DOZING

## 2024-03-27 ENCOUNTER — E-VISIT (OUTPATIENT)
Dept: URGENT CARE | Facility: CLINIC | Age: 44
End: 2024-03-27
Payer: COMMERCIAL

## 2024-03-27 ENCOUNTER — OFFICE VISIT (OUTPATIENT)
Dept: SLEEP MEDICINE | Facility: CLINIC | Age: 44
End: 2024-03-27
Payer: COMMERCIAL

## 2024-03-27 DIAGNOSIS — G47.33 OSA (OBSTRUCTIVE SLEEP APNEA): Primary | ICD-10-CM

## 2024-03-27 DIAGNOSIS — N76.0 BACTERIAL VAGINOSIS: Primary | ICD-10-CM

## 2024-03-27 DIAGNOSIS — I10 ESSENTIAL HYPERTENSION: Chronic | ICD-10-CM

## 2024-03-27 DIAGNOSIS — R53.83 FATIGUE, UNSPECIFIED TYPE: ICD-10-CM

## 2024-03-27 DIAGNOSIS — E66.01 MORBID OBESITY (H): Chronic | ICD-10-CM

## 2024-03-27 DIAGNOSIS — B96.89 BACTERIAL VAGINOSIS: Primary | ICD-10-CM

## 2024-03-27 PROCEDURE — G0399 HOME SLEEP TEST/TYPE 3 PORTA: HCPCS | Performed by: INTERNAL MEDICINE

## 2024-03-27 PROCEDURE — 99421 OL DIG E/M SVC 5-10 MIN: CPT | Performed by: NURSE PRACTITIONER

## 2024-03-27 RX ORDER — METRONIDAZOLE 500 MG/1
500 TABLET ORAL 2 TIMES DAILY
Qty: 14 TABLET | Refills: 0 | Status: SHIPPED | OUTPATIENT
Start: 2024-03-27 | End: 2024-04-03

## 2024-03-27 NOTE — PROGRESS NOTES
Pt is completing a home sleep test. Pt was instructed on how to put on the Noxturnal T3 device and associated equipment before going to bed and given the opportunity to practice putting it on before leaving the sleep center. Pt was reminded to bring the home sleep test kit back to the center tomorrow, at agreed upon time for download and reporting.   Neck circumference: 42 CM / 16.5 inches.

## 2024-03-28 ENCOUNTER — DOCUMENTATION ONLY (OUTPATIENT)
Dept: SLEEP MEDICINE | Facility: CLINIC | Age: 44
End: 2024-03-28
Payer: COMMERCIAL

## 2024-03-28 NOTE — PROGRESS NOTES
HST POST-STUDY QUESTIONNAIRE    What time did you go to bed?  22:00  How long do you think it took to fall asleep?  1 hour  What time did you wake up to start the day?  06:30  Did you get up during the night at all?  No  If you woke up, do you remember approximately what time(s)?   Did you have any difficulty with the equipment?  No  Did you us any type of treatment with this study?  None  Was the head of the bed elevated? No  Did you sleep in a recliner?  No  Did you stop using CPAP at least 3 days before this test?  NA  Any other information you'd like us to know?

## 2024-03-28 NOTE — PATIENT INSTRUCTIONS
"Thank you for choosing us for your care. I have placed an order for a prescription so that you can start treatment. View your full visit summary for details by clicking on the link below. Your pharmacist will able to address any questions you may have about the medication.     If you re not feeling better within 2-3 days, please schedule an appointment.  You can schedule an appointment right here in [facility], or call [phone]  If the visit is for the same symptoms as your eVisit, we ll refund the cost of your eVisit if seen within seven days.    Bacterial Vaginosis: Care Instructions  Overview     Bacterial vaginosis is a condition in which there is excess growth of certain bacteria that are normally found in the vagina. Symptoms often include abnormal gray or yellow discharge with a \"fishy\" odor. It is not considered an infection that is spread through sexual contact.  Symptoms can be annoying and uncomfortable. But bacterial vaginosis does not usually cause other health problems. However, in some cases it can lead to more serious issues.  While bacterial vaginosis may go away on its own, most doctors use antibiotics to treat it. You may have been prescribed pills or vaginal cream. With treatment, bacterial vaginosis usually clears up in 5 to 7 days.  Follow-up care is a key part of your treatment and safety. Be sure to make and go to all appointments, and call your doctor if you are having problems. It's also a good idea to know your test results and keep a list of the medicines you take.  How can you care for yourself at home?  Take your antibiotics as directed. Do not stop taking them just because you feel better. You need to take the full course of antibiotics.  Do not eat or drink anything that contains alcohol if you are taking metronidazole or tinidazole.  Keep using your medicine if you start your period. Use pads instead of tampons while using a vaginal cream or suppository. Tampons can absorb the " "medicine.  Wear loose cotton clothing. Do not wear nylon and other materials that hold body heat and moisture close to the skin.  Do not scratch. Relieve itching with a cold pack or a cool bath.  Do not wash your vulva more than once a day. Use plain water or a mild, unscented soap. Do not douche.  When should you call for help?   Call your doctor now or seek immediate medical care if:    You have a fever.     You have new or worse pain in your vagina or pelvis.   Watch closely for changes in your health, and be sure to contact your doctor if:    You have new or worse vaginal itching or discharge.     You have unexpected vaginal bleeding.     You are not getting better as expected.     Your symptoms return after you finish the course of your medicine.   Where can you learn more?  Go to https://www.[employer].net/patiented  Enter X360 in the search box to learn more about \"Bacterial Vaginosis: Care Instructions.\"  Current as of: November 27, 2023               Content Version: 14.0    3026-1097 [employer].   Care instructions adapted under license by your healthcare professional. If you have questions about a medical condition or this instruction, always ask your healthcare professional. [employer] disclaims any warranty or liability for your use of this information.      "

## 2024-03-30 NOTE — PROGRESS NOTES
This HSAT was performed using a Noxturnal T3 device which recorded snore, sound, movement activity, body position, nasal pressure, oronasal thermal airflow, pulse, oximetry and both chest and abdominal respiratory effort. HSAT data was restricted to the time patient states they were in bed.     HSAT was scored using 1B 4% hypopnea rule.     AHI: 44.7  Snoring was reported as loud.  Time with SpO2 below 89% was 2.8 minutes.   Overall signal quality was good     Pt will follow up with sleep provider to determine appropriate therapy.     Ordering Provider, Mitchell Hernandez MD C. Oyugi, BA, RPSGT, RST System Clinical Specialist/ 3/30/2024

## 2024-04-01 PROBLEM — G47.33 OSA (OBSTRUCTIVE SLEEP APNEA): Status: ACTIVE | Noted: 2024-04-01

## 2024-04-01 NOTE — PROCEDURES
"HOME SLEEP STUDY INTERPRETATION        Patient: Shala Poole  MRN: [id number]  YOB: 1980  Study Date: 3/27/2024  PCP/Referring Provider: Kehr, Kristen M;   Ordering Provider:   Mitchell Hernandez MD         Indications for Home Study: Shala Poole is a 44 year who presents with symptoms suggestive of obstructive sleep apnea.    Estimated body mass index is 42.22 kg/m  as calculated from the following:    Height as of 2/15/24: 1.74 m (5' 8.5\").    Weight as of 2/15/24: 127.8 kg (281 lb 12.8 oz).  Total score - [place]: 7 (3/20/2024  8:22 AM)  StopBang Total Score: 3 (2/15/2024  9:00 AM)Total Score: 4 (3/20/2024  8:23 AM)        Data: A full night home sleep study was performed recording the standard physiologic parameters including body position, movement, sound, nasal pressure, thermal oral airflow, chest and abdominal movements with respiratory inductance plethysmography, and oxygen saturation by pulse oximetry. Pulse rate was estimated by oximetry recording. This study was considered adequate based on > 4 hours of quality oximetry and respiratory recording. As specified by the AASM Manual for the Scoring of Sleep and Associated events, version 2.3, Rule VIII.D 1B, 4% oxygen desaturation scoring for hypopneas is used as a standard of care on all home sleep apnea testing.        Analysis Time:  512 minutes        Respiration:   Sleep Associated Hypoxemia: sustained hypoxemia was not present. Baseline oxygen saturation was 96%.  Time with saturation less than or equal to 88% was 0.7 minutes. The lowest oxygen saturation was 86%.   Snoring: Snoring was present.  Respiratory events: The home study revealed a presence of 47 obstructive apneas and 0 mixed and central apneas. There were 334 hypopneas resulting in a combined apnea/hypopnea index [AHI] of 44 events per hour.  AHI was 62 per hour supine, 7 per hour prone, 47 per hour on left side, and 13 per hour on right side.   Pattern: Excluding " events noted above, respiratory rate and pattern was Normal.      Position: Percent of time spent: supine - 8%, prone - 4%, on left - 80%, on right - 6%.      Heart Rate: By pulse oximetry normal rate was noted.       Assessment:   Severe obstructive sleep apnea.  Sleep associated hypoxemia was not present.    Recommendations:  Consider auto-CPAP at 7-18 cmH2O, polysomnography with full night PAP titration, or surgical options.  Suggest optimizing sleep hygiene and avoiding sleep deprivation.  Weight management.        Diagnosis Code(s): Obstructive Sleep Apnea G47.33    Electronically signed by: Mitchell Hernandez MD, April 1, 2024   Diplomate, American Board of Internal Medicine, Sleep Medicine      oral

## 2024-04-09 ENCOUNTER — DOCUMENTATION ONLY (OUTPATIENT)
Dept: SLEEP MEDICINE | Facility: CLINIC | Age: 44
End: 2024-04-09
Payer: COMMERCIAL

## 2024-04-09 DIAGNOSIS — G47.33 OBSTRUCTIVE SLEEP APNEA: Primary | ICD-10-CM

## 2024-04-09 DIAGNOSIS — E66.01 MORBID OBESITY (H): Chronic | ICD-10-CM

## 2024-04-09 NOTE — PROGRESS NOTES
Patient was offered choice of vendor and chose [facility].  Patient Shala Poole was set up at [facility] on April 9, 2024. Patient received a Resmed Airsense 11 Pressures were set at  7-18 cm H2O.   Patient s ramp is 5 cm H2O for Auto and FLEX/EPR is 2.  Patient received a [employer]ICS Mask name: [id number]  Nasal mask size Standard, heated tubing and heated humidifier.  Patient has the following compliance requirements: none    Eileen Jay

## 2024-04-12 ENCOUNTER — DOCUMENTATION ONLY (OUTPATIENT)
Dept: SLEEP MEDICINE | Facility: CLINIC | Age: 44
End: 2024-04-12
Payer: COMMERCIAL

## 2024-04-12 DIAGNOSIS — E66.01 MORBID OBESITY (H): Primary | Chronic | ICD-10-CM

## 2024-04-12 NOTE — PROGRESS NOTES
3 day Sleep therapy management telephone visit    Diagnostic AHI:    HST: 44.7          Confirmed with patient at time of call- Yes Patient is still interested in STM service       Subjective measures:  Patient reports things are going well with CPAP and denies any questions or concerns. Patient was given my contact information to call if they have any questions.     Order settings:  CPAP MIN CPAP MAX   7 cm H2O 18 cm H2O       Device settings:  CPAP MIN CPAP MAX EPR RESMED SOFT RESPONSE SETTING   7 cm  H20 18 cm  H20 2 OFF       Compliance 100 %    Assessment: Nightly usage over four hours     Action plan: Patient to have 14 day STM visit.     Patient has the following upcoming sleep appts:  Future Sleep Appointments         Provider Department    7/24/2024 7:30 AM (Arrive by 7:15 AM) Mitchell Hernandez MD [facility]            Replacement device: No  STM ordered by provider: Yes     Total time spent on accessing and  interpreting remote patient PAP therapy data  10 minutes    Total time spent counseling, coaching  and reviewing PAP therapy data with patient  1 minutes    [id number] no

## 2024-04-18 ENCOUNTER — DOCUMENTATION ONLY (OUTPATIENT)
Dept: SLEEP MEDICINE | Facility: CLINIC | Age: 44
End: 2024-04-18
Payer: COMMERCIAL

## 2024-04-18 DIAGNOSIS — E66.01 MORBID OBESITY (H): Primary | Chronic | ICD-10-CM

## 2024-04-18 NOTE — PROGRESS NOTES
Patient came to virtual set up via telephone visit for mask fitting appointment on April 18, 2024. Patient requested to switch masks because soreness/skin irritation . Discussed the following masks: Airfit N20 Patient selected a Resmed Mask name: N20 Nasal mask size Medium.  30 day exchange

## 2024-04-24 ENCOUNTER — DOCUMENTATION ONLY (OUTPATIENT)
Dept: SLEEP MEDICINE | Facility: CLINIC | Age: 44
End: 2024-04-24
Payer: COMMERCIAL

## 2024-04-24 DIAGNOSIS — E66.01 MORBID OBESITY (H): Primary | Chronic | ICD-10-CM

## 2024-04-24 NOTE — PROGRESS NOTES
14  DAY STM VISIT    Diagnostic AHI:  HST: 44.7        Message left for patient to return call     Assessment: Pt meeting objective benchmarks.      Action plan: waiting for patient to return call.  and pt to have 30 day STM visit.      Device type: Auto-CPAP    PAP settings:  CPAP MIN CPAP MAX 95TH % PRESSURE EPR RESMED SOFT RESPONSE SETTING   7 cm  H20 18 cm  H20 9 cm  H20  2 OFF     Mask type:  Nasal Mask    Objective measures: 14 day rolling measures   COMPLIANCE LEAK AHI AVERAGE USE IN MINUTES   86 % 1.3 0.7 482   GOAL >70% GOAL < 24 LPM GOAL <5 GOAL >240      Patient has the following upcoming sleep appts:  Future Sleep Appointments         Provider Department    7/24/2024 7:30 AM (Arrive by 7:15 AM) Mitchell Hernandez MD [facility] [place]            Total time spent on accessing and interpreting remote patient PAP therapy data  10 minutes    Total time spent counseling, coaching  and reviewing PAP therapy data with patient  1 minute    [id number]  [id number]  no (3 day STM)

## 2024-05-04 NOTE — NURSING NOTE
"Chief Complaint   Patient presents with     Leg Pain     Pain in left calf        Initial /81  Pulse 93  Temp 98.4  F (36.9  C) (Oral)  Ht 5' 8.5\" (1.74 m)  Wt 271 lb (122.9 kg)  SpO2 100%  Breastfeeding? No  BMI 40.61 kg/m2 Estimated body mass index is 40.61 kg/(m^2) as calculated from the following:    Height as of this encounter: 5' 8.5\" (1.74 m).    Weight as of this encounter: 271 lb (122.9 kg).  Medication Reconciliation: complete    Laurence Vaughn CMA      " show

## 2024-05-16 DIAGNOSIS — F32.0 MAJOR DEPRESSIVE DISORDER, SINGLE EPISODE, MILD (H): ICD-10-CM

## 2024-05-17 RX ORDER — FLUOXETINE 40 MG/1
40 CAPSULE ORAL DAILY
Qty: 90 CAPSULE | Refills: 0 | OUTPATIENT
Start: 2024-05-17

## 2024-06-24 ASSESSMENT — PATIENT HEALTH QUESTIONNAIRE - PHQ9
SUM OF ALL RESPONSES TO PHQ QUESTIONS 1-9: 4
10. IF YOU CHECKED OFF ANY PROBLEMS, HOW DIFFICULT HAVE THESE PROBLEMS MADE IT FOR YOU TO DO YOUR WORK, TAKE CARE OF THINGS AT HOME, OR GET ALONG WITH OTHER PEOPLE: SOMEWHAT DIFFICULT
SUM OF ALL RESPONSES TO PHQ QUESTIONS 1-9: 4

## 2024-06-25 ENCOUNTER — OFFICE VISIT (OUTPATIENT)
Dept: FAMILY MEDICINE | Facility: CLINIC | Age: 44
End: 2024-06-25
Payer: COMMERCIAL

## 2024-06-25 VITALS
DIASTOLIC BLOOD PRESSURE: 80 MMHG | WEIGHT: 286 LBS | RESPIRATION RATE: 20 BRPM | HEIGHT: 69 IN | OXYGEN SATURATION: 99 % | SYSTOLIC BLOOD PRESSURE: 122 MMHG | BODY MASS INDEX: 42.36 KG/M2 | HEART RATE: 98 BPM | TEMPERATURE: 98.7 F

## 2024-06-25 DIAGNOSIS — G89.29 CHRONIC RIGHT-SIDED LOW BACK PAIN WITHOUT SCIATICA: ICD-10-CM

## 2024-06-25 DIAGNOSIS — M53.3 CHRONIC RIGHT SI JOINT PAIN: Primary | ICD-10-CM

## 2024-06-25 DIAGNOSIS — M54.50 CHRONIC RIGHT-SIDED LOW BACK PAIN WITHOUT SCIATICA: ICD-10-CM

## 2024-06-25 DIAGNOSIS — G89.29 CHRONIC RIGHT SI JOINT PAIN: Primary | ICD-10-CM

## 2024-06-25 PROCEDURE — 99213 OFFICE O/P EST LOW 20 MIN: CPT | Performed by: PHYSICIAN ASSISTANT

## 2024-06-25 ASSESSMENT — PAIN SCALES - GENERAL: PAINLEVEL: MILD PAIN (3)

## 2024-06-25 ASSESSMENT — ENCOUNTER SYMPTOMS: HIP PAIN: 1

## 2024-06-25 NOTE — PROGRESS NOTES
"  Assessment & Plan     Chronic right SI joint pain  Chronic right-sided low back pain without sciatica  NSAIDS as needed for acute pain  Start therapy.   Appointment with Orthopedics if needed in the future.   - Physical Therapy  Referral; Future  - Orthopedic  Referral; Future          BMI  Estimated body mass index is 42.54 kg/m  as calculated from the following:    Height as of this encounter: 1.746 m (5' 8.75\").    Weight as of this encounter: 129.7 kg (286 lb).             Subjective   Shala is a 44 year old, presenting for the following health issues:  Hip Pain (Left hip/groin area pain. Right lower back. Patient is not having right now, pain comes on and off. )        6/25/2024    10:34 AM   Additional Questions   Roomed by Thomas VARGAS MA     History of Present Illness       Reason for visit:  Stabbing pain on left side hip/abdomen, comes and goes. Left hip pain that leaves me unable to move  Symptom onset:  1-2 weeks ago  Symptoms include:  Stabbing pain on left side that comes and goes. Hip/Back pain on right side that some times makes me unable to move.  Symptom intensity:  Moderate  Symptom progression:  Staying the same  Had these symptoms before:  No  What makes it worse:  Left side no. right side, sometimes just moving  What makes it better:  Left side no, right side moving or sometimes resting    She eats 0-1 servings of fruits and vegetables daily.She consumes 2 sweetened beverage(s) daily.She exercises with enough effort to increase her heart rate 10 to 19 minutes per day.  She exercises with enough effort to increase her heart rate 3 or less days per week.   She is taking medications regularly.       Shala is here today for right low back and hip pain.   She has had the pain for years. It is typically dull in nature and painful with all activity.   She does not have radiation of the pain. No weakness / numbness associated.   She was evaluated and treated by Chiropractor in the " "past and adjustments were helpful, but nothing helped long term. She eventually stopped going.               Review of Systems  Constitutional, HEENT, cardiovascular, pulmonary, GI, , musculoskeletal, neuro, skin, endocrine and psych systems are negative, except as otherwise noted.      Objective    /80   Pulse 98   Temp 98.7  F (37.1  C) (Tympanic)   Resp 20   Ht 1.746 m (5' 8.75\")   Wt 129.7 kg (286 lb)   LMP  (LMP Unknown)   SpO2 99%   Breastfeeding No   BMI 42.54 kg/m    Body mass index is 42.54 kg/m .  Physical Exam   GENERAL: alert and no distress  MS: no gross musculoskeletal defects noted, no edema  NEURO: Normal strength and tone, mentation intact and speech normal  Comprehensive back pain exam:  Tenderness of right SI joint area, right lumbar muscle, Range of motion not limited by pain, Lower extremity strength functional and equal on both sides, Lower extremity sensation normal and equal on both sides, and Straight leg raise negative bilaterally  PSYCH: mentation appears normal, affect normal/bright            Signed Electronically by: Kristen M. Kehr, PA-C    "

## 2024-07-02 ENCOUNTER — THERAPY VISIT (OUTPATIENT)
Dept: PHYSICAL THERAPY | Facility: CLINIC | Age: 44
End: 2024-07-02
Attending: PHYSICIAN ASSISTANT
Payer: COMMERCIAL

## 2024-07-02 DIAGNOSIS — M54.50 CHRONIC RIGHT-SIDED LOW BACK PAIN WITHOUT SCIATICA: ICD-10-CM

## 2024-07-02 DIAGNOSIS — M53.3 SACROILIAC JOINT PAIN: Primary | ICD-10-CM

## 2024-07-02 DIAGNOSIS — G89.29 CHRONIC RIGHT SI JOINT PAIN: ICD-10-CM

## 2024-07-02 DIAGNOSIS — G89.29 CHRONIC RIGHT-SIDED LOW BACK PAIN WITHOUT SCIATICA: ICD-10-CM

## 2024-07-02 DIAGNOSIS — M53.3 CHRONIC RIGHT SI JOINT PAIN: ICD-10-CM

## 2024-07-02 PROCEDURE — 97110 THERAPEUTIC EXERCISES: CPT | Mod: GP | Performed by: PHYSICAL THERAPIST

## 2024-07-02 PROCEDURE — 97161 PT EVAL LOW COMPLEX 20 MIN: CPT | Mod: GP | Performed by: PHYSICAL THERAPIST

## 2024-07-02 PROCEDURE — 97140 MANUAL THERAPY 1/> REGIONS: CPT | Mod: GP | Performed by: PHYSICAL THERAPIST

## 2024-07-02 NOTE — PROGRESS NOTES
PHYSICAL THERAPY EVALUATION  Type of Visit: Evaluation              Subjective   Pt reports recurring Rt LBP/glut pain. She admits she fell fracutureing her sacrum, having 3 children all natural births. She had a recent episode where she could not rise from a couch. She feels one leg is shorter than the other.        Presenting condition or subjective complaint: Hip/low back pain on the right side  Date of onset:      Relevant medical history: Depression; Hearing problems; High blood pressure; Overweight; Sleep disorder like apnea   Dates & types of surgery:      Prior diagnostic imaging/testing results:       Prior therapy history for the same diagnosis, illness or injury: No      Prior Level of Function  Transfers: Independent  Ambulation: Independent  ADL: Independent  IADL: Driving, Finances, Housekeeping, Laundry, Meal preparation, Medication management    Living Environment  Social support: With family members   Type of home: House   Stairs to enter the home: Yes 2 Is there a railing: Yes     Ramp: No   Stairs inside the home: Yes 10 Is there a railing: Yes     Help at home: None  Equipment owned:       Employment: Yes   Hobbies/Interests: Reading, walking with family, traveling    Patient goals for therapy: Stand for longer periods of time/ be able to bend and unload  without pain.    Pain assessment: Location: Rt SI/Glut /Rating: 10/10     Objective   LUMBAR SPINE EVALUATION  PAIN: Pain is Exacerbated By: squatting, standing   INTEGUMENTARY (edema, incisions):   POSTURE: WNL  GAIT:   Weightbearing Status:   Assistive Device(s):   Gait Deviations:   BALANCE/PROPRIOCEPTION:   WEIGHTBEARING ALIGNMENT:  Rt Crest elevated vs  left   NON-WEIGHTBEARING ALIGNMENT:    ROM: AROM WFL  PELVIC/SI SCREEN:  +Rt SI, post rot, inflare, +pub, +++AP glide   STRENGTH: WNL    MYOTOMES:   DTR S:   CORD SIGNS:   DERMATOMES:   NEURAL TENSION: neg   FLEXIBILITY:   LUMBAR/HIP Special Tests: ++obers, neg  GIUSEPPE    PELVIS/SI SPECIAL TESTS:  +Rt SI flare, post rot, ++AP glide   FUNCTIONAL TESTS:   PALPATION: negative   SPINAL SEGMENTAL CONCLUSIONS:       Assessment & Plan   CLINICAL IMPRESSIONS  Medical Diagnosis: SI pain    Treatment Diagnosis: SI pain   Impression/Assessment: Patient is a 44 year old female with Rt lbp complaints.  The following significant findings have been identified: Pain, Decreased ROM/flexibility, Decreased joint mobility, Impaired muscle performance, and Decreased activity tolerance. These impairments interfere with their ability to perform self care tasks, work tasks, recreational activities, household chores, driving , and household mobility as compared to previous level of function.   Pt presents w/ chronic recurring low back pain/SI pain. She will benefit from a course of PT to improve her functional levels.     Clinical Decision Making (Complexity):  Clinical Presentation: Stable/Uncomplicated  Clinical Presentation Rationale: based on medical and personal factors listed in PT evaluation  Clinical Decision Making (Complexity): Low complexity    PLAN OF CARE  Treatment Interventions:  Interventions: Manual Therapy, Neuromuscular Re-education, Therapeutic Exercise, Self-Care/Home Management    Long Term Goals            Frequency of Treatment:    Duration of Treatment:      Recommended Referrals to Other Professionals: Physical Therapy  Education Assessment:        Risks and benefits of evaluation/treatment have been explained.   Patient/Family/caregiver agrees with Plan of Care.     Evaluation Time:             Signing Clinician: Anjum Edwards, [facility]                                                                                   OUTPATIENT PHYSICAL THERAPY

## 2024-07-18 ASSESSMENT — SLEEP AND FATIGUE QUESTIONNAIRES
HOW LIKELY ARE YOU TO NOD OFF OR FALL ASLEEP WHILE WATCHING TV: SLIGHT CHANCE OF DOZING
HOW LIKELY ARE YOU TO NOD OFF OR FALL ASLEEP WHEN YOU ARE A PASSENGER IN A CAR FOR AN HOUR WITHOUT A BREAK: SLIGHT CHANCE OF DOZING
HOW LIKELY ARE YOU TO NOD OFF OR FALL ASLEEP WHILE SITTING AND TALKING TO SOMEONE: WOULD NEVER DOZE
HOW LIKELY ARE YOU TO NOD OFF OR FALL ASLEEP WHILE SITTING INACTIVE IN A PUBLIC PLACE: WOULD NEVER DOZE
HOW LIKELY ARE YOU TO NOD OFF OR FALL ASLEEP WHILE LYING DOWN TO REST IN THE AFTERNOON WHEN CIRCUMSTANCES PERMIT: SLIGHT CHANCE OF DOZING
HOW LIKELY ARE YOU TO NOD OFF OR FALL ASLEEP WHILE SITTING QUIETLY AFTER LUNCH WITHOUT ALCOHOL: WOULD NEVER DOZE
HOW LIKELY ARE YOU TO NOD OFF OR FALL ASLEEP WHILE SITTING AND READING: SLIGHT CHANCE OF DOZING
HOW LIKELY ARE YOU TO NOD OFF OR FALL ASLEEP IN A CAR, WHILE STOPPED FOR A FEW MINUTES IN TRAFFIC: WOULD NEVER DOZE

## 2024-07-22 ENCOUNTER — TELEPHONE (OUTPATIENT)
Dept: ENDOCRINOLOGY | Facility: CLINIC | Age: 44
End: 2024-07-22

## 2024-07-22 ENCOUNTER — THERAPY VISIT (OUTPATIENT)
Dept: PHYSICAL THERAPY | Facility: CLINIC | Age: 44
End: 2024-07-22
Payer: COMMERCIAL

## 2024-07-22 ENCOUNTER — VIRTUAL VISIT (OUTPATIENT)
Dept: ENDOCRINOLOGY | Facility: CLINIC | Age: 44
End: 2024-07-22
Payer: COMMERCIAL

## 2024-07-22 VITALS — HEIGHT: 69 IN | BODY MASS INDEX: 41.47 KG/M2 | WEIGHT: 280 LBS

## 2024-07-22 DIAGNOSIS — E66.01 MORBID OBESITY (H): Primary | ICD-10-CM

## 2024-07-22 DIAGNOSIS — M53.3 SACROILIAC JOINT PAIN: Primary | ICD-10-CM

## 2024-07-22 PROCEDURE — 99213 OFFICE O/P EST LOW 20 MIN: CPT | Mod: 95 | Performed by: INTERNAL MEDICINE

## 2024-07-22 PROCEDURE — 97110 THERAPEUTIC EXERCISES: CPT | Mod: GP | Performed by: PHYSICAL THERAPIST

## 2024-07-22 ASSESSMENT — PAIN SCALES - GENERAL: PAINLEVEL: NO PAIN (0)

## 2024-07-22 NOTE — LETTER
"2024       RE: Shala Poole  [address]  [place] [address]     Dear Colleague,    Thank you for referring your patient, Shala Poole, to the [facility] WEIGHT MANAGEMENT [facility]INIC [place] at [facility]. Please see a copy of my visit note below.    Virtual Visit Details    Joined the call at 2024, 8:25:33 am.  Left the call at 2024, 8:32:44 am.    Originating Location (pt. Location): Home    Distant Location (provider location):  Off-site  Platform used for Video Visit: [facility] Medical Weight Management Note     Shala Poole  MRN:  [id number]  :  1980  DANNIELLE:  2024    Dear Kristen M. Kehr, PA-C,    I had the pleasure of seeing your patient Shala Poole.  She is a 44 year old female who I am continuing to see for treatment of obesity related to:      2022     8:35 PM   --   I have the following health issues associated with obesity High Blood Pressure    GERD (Reflux)   I have the following symptoms associated with obesity Knee Pain    Depression    Back Pain    Fatigue    Hip Pain     CURRENT WEIGHT:   280 lbs 0 oz    Wt Readings from Last 4 Encounters:   24 127 kg (280 lb)   24 129.7 kg (286 lb)   02/15/24 127.8 kg (281 lb 12.8 oz)   23 122.9 kg (271 lb)     Height:  5' 8.5\"  Body Mass Index:  Body mass index is 41.95 kg/m .  Vitals:  B/P: Data Unavailable, P: Data Unavailable    Initial consult weight was 276 on 22.  Weight change since last seen on 22 is up 4 pounds.   Total gain is 4 pounds.    INTERVAL HISTORY:  Stopped meds long ago due to fast heart rate and dizzy. In interm has been hoping to exercise.        2022     8:35 PM   Diet Recall Review with Patient   Do you typically eat breakfast? No   Do you typically eat lunch? Yes   If you do eat lunch, what types of food do you typically eat? Fast food, frozen pizza   Do you typically eat " supper? Yes   If you do eat supper, what types of food do you typically eat? Meat and potatoes, pasta,   Do you typically eat snacks? Yes   If you do snack, what types of food do you typically eat? Chips and cheese, cookies, beef jerky, popcorn, cheese sticks,   Do you like vegetables?  Yes   Do you drink water? Yes   How many glasses of juice do you drink in a typical day? 0   How many of glasses of milk do you drink in a typical day? 0   If you do drink milk, what type? N/A   How many 8oz glasses of sugar containing drinks such as Abel-Aid/sweet tea do you drink in a day? 0   How many cans/bottles of sugar pop/soda/tea/sports drinks do you drink in a day? 3   How many cans/bottles of diet pop/soda/tea or sports drink do you drink in a day? 2   How often do you have a drink of alcohol? 2-4 Times a Month   If you do drink, how many drinks might you have in a day? 3-4     MEDICATIONS:   Current Outpatient Medications   Medication Sig Dispense Refill    acetaminophen (TYLENOL) 325 MG tablet Take 325-650 mg by mouth      amLODIPine (NORVASC) 2.5 MG tablet Take 1 tablet (2.5 mg) by mouth daily 90 tablet 3    FLUoxetine (PROZAC) 20 MG capsule Take 1 capsule (20 mg) by mouth daily Take with 40 for total of 60 90 capsule 3    FLUoxetine (PROZAC) 40 MG capsule Take 1 capsule (40 mg) by mouth daily 90 capsule 3    triamcinolone (KENALOG) 0.1 % external cream Apply topically twice daily. 30 g 1     No current facility-administered medications for this visit.         7/18/2024     6:38 AM   Weight Loss Medication History Reviewed With Patient   Which weight loss medications are you currently taking on a regular basis? None   If you are not taking a weight loss medication that was prescribed to you, please indicate why: I did not like the side effects    It did not seem to be helping me   Are you having any side effects from the weight loss medication that we have prescribed you? Yes   If you are having side effects please  describe: Raised heart rate, light headed     ASSESSMENT:   Will attempt Wegovy (back-up of topiramate monotherapy).    FOLLOW-UP:    12 weeks.    Sincerely,  Silviano Redd MD

## 2024-07-22 NOTE — NURSING NOTE
Current patient location: work     Is the patient currently in the state of [place]? YES    Visit mode:VIDEO    If the visit is dropped, the patient can be reconnected by: VIDEO VISIT: Text to cell phone:   Telephone Information:   Mobile [phone]       Will anyone else be joining the visit? NO  (If patient encounters technical issues they should call [phone])    How would you like to obtain your AVS? MyChart    Are changes needed to the allergy or medication list? Pt stated no changes to allergies and Pt stated no med changes    Are refills needed on medications prescribed by this physician? NO    Reason for visit: RECHECK    Wt other than 24 hrs: estimation   Pain more than one location:  no  Moraima SYF

## 2024-07-22 NOTE — PROGRESS NOTES
"    Return Medical Weight Management Note     Shala Poole  MRN:  [id number]  :  1980  DANNIELLE:  2024    Dear Kristen M. Kehr, PA-C,    I had the pleasure of seeing your patient Shala Poole.  She is a 44 year old female who I am continuing to see for treatment of obesity related to:      2022     8:35 PM   --   I have the following health issues associated with obesity High Blood Pressure    GERD (Reflux)   I have the following symptoms associated with obesity Knee Pain    Depression    Back Pain    Fatigue    Hip Pain     CURRENT WEIGHT:   280 lbs 0 oz    Wt Readings from Last 4 Encounters:   24 127 kg (280 lb)   24 129.7 kg (286 lb)   02/15/24 127.8 kg (281 lb 12.8 oz)   23 122.9 kg (271 lb)     Height:  5' 8.5\"  Body Mass Index:  Body mass index is 41.95 kg/m .  Vitals:  B/P: Data Unavailable, P: Data Unavailable    Initial consult weight was 276 on 22.  Weight change since last seen on 22 is up 4 pounds.   Total gain is 4 pounds.    INTERVAL HISTORY:  Stopped meds long ago due to fast heart rate and dizzy. In interm has been hoping to exercise.        2022     8:35 PM   Diet Recall Review with Patient   Do you typically eat breakfast? No   Do you typically eat lunch? Yes   If you do eat lunch, what types of food do you typically eat? Fast food, frozen pizza   Do you typically eat supper? Yes   If you do eat supper, what types of food do you typically eat? Meat and potatoes, pasta,   Do you typically eat snacks? Yes   If you do snack, what types of food do you typically eat? Chips and cheese, cookies, beef jerky, popcorn, cheese sticks,   Do you like vegetables?  Yes   Do you drink water? Yes   How many glasses of juice do you drink in a typical day? 0   How many of glasses of milk do you drink in a typical day? 0   If you do drink milk, what type? N/A   How many 8oz glasses of sugar containing drinks such as Abel-Aid/sweet tea do you drink in a day? 0 "   How many cans/bottles of sugar pop/soda/tea/sports drinks do you drink in a day? 3   How many cans/bottles of diet pop/soda/tea or sports drink do you drink in a day? 2   How often do you have a drink of alcohol? 2-4 Times a Month   If you do drink, how many drinks might you have in a day? 3-4     MEDICATIONS:   Current Outpatient Medications   Medication Sig Dispense Refill    acetaminophen (TYLENOL) 325 MG tablet Take 325-650 mg by mouth      amLODIPine (NORVASC) 2.5 MG tablet Take 1 tablet (2.5 mg) by mouth daily 90 tablet 3    FLUoxetine (PROZAC) 20 MG capsule Take 1 capsule (20 mg) by mouth daily Take with 40 for total of 60 90 capsule 3    FLUoxetine (PROZAC) 40 MG capsule Take 1 capsule (40 mg) by mouth daily 90 capsule 3    triamcinolone (KENALOG) 0.1 % external cream Apply topically twice daily. 30 g 1     No current facility-administered medications for this visit.         7/18/2024     6:38 AM   Weight Loss Medication History Reviewed With Patient   Which weight loss medications are you currently taking on a regular basis? None   If you are not taking a weight loss medication that was prescribed to you, please indicate why: I did not like the side effects    It did not seem to be helping me   Are you having any side effects from the weight loss medication that we have prescribed you? Yes   If you are having side effects please describe: Raised heart rate, light headed     ASSESSMENT:   Will attempt Wegovy (back-up of topiramate monotherapy).    FOLLOW-UP:    12 weeks.    Sincerely,  Silviano Redd MD

## 2024-07-22 NOTE — TELEPHONE ENCOUNTER
Prior Authorization Approval    Medication: WEGOVY 0.25 MG/0.5ML SC SOAJ  Authorization Effective Date: 7/22/2024  Authorization Expiration Date: 2/17/2025  Approved Dose/Quantity: 2  Reference #: [id number]   Insurance Company: [employer] - Phone [phone] Fax [phone]  Expected CoPay: $    CoPay Card Available:      Financial Assistance Needed:    Which Pharmacy is filling the prescription:    Pharmacy Notified: yes  Patient Notified: yes

## 2024-07-22 NOTE — PROGRESS NOTES
Virtual Visit Details    Joined the call at 7/22/2024, 8:25:33 am.  Left the call at 7/22/2024, 8:32:44 am.    Originating Location (pt. Location): Home    Distant Location (provider location):  Off-site  Platform used for Video Visit: Danielle

## 2024-07-24 ENCOUNTER — OFFICE VISIT (OUTPATIENT)
Dept: SLEEP MEDICINE | Facility: CLINIC | Age: 44
End: 2024-07-24
Payer: COMMERCIAL

## 2024-07-24 VITALS
HEIGHT: 69 IN | BODY MASS INDEX: 42.03 KG/M2 | DIASTOLIC BLOOD PRESSURE: 79 MMHG | HEART RATE: 95 BPM | RESPIRATION RATE: 16 BRPM | SYSTOLIC BLOOD PRESSURE: 119 MMHG | WEIGHT: 283.8 LBS | OXYGEN SATURATION: 98 %

## 2024-07-24 DIAGNOSIS — G47.33 OSA (OBSTRUCTIVE SLEEP APNEA): Primary | ICD-10-CM

## 2024-07-24 DIAGNOSIS — E66.01 MORBID OBESITY (H): Chronic | ICD-10-CM

## 2024-07-24 PROCEDURE — 99213 OFFICE O/P EST LOW 20 MIN: CPT | Performed by: INTERNAL MEDICINE

## 2024-07-24 NOTE — NURSING NOTE
"Chief Complaint   Patient presents with    Study Results    CPAP Follow Up     New CPAP user.       Initial /79   Pulse 95   Resp 16   Ht 1.74 m (5' 8.5\")   Wt 128.7 kg (283 lb 12.8 oz)   LMP  (LMP Unknown)   SpO2 98%   BMI 42.52 kg/m   Estimated body mass index is 42.52 kg/m  as calculated from the following:    Height as of this encounter: 1.74 m (5' 8.5\").    Weight as of this encounter: 128.7 kg (283 lb 12.8 oz).    Medication Reconciliation: complete  ESS: 4  Neck circumference: 16.50 inches / 42 centimeters.  DME: BREA Jc CMA    "

## 2024-07-24 NOTE — NURSING NOTE
Per provider order CPAP pressures changed to:  07/24/2024, 05:37 AM  by Nevaeh Jc  Settings sent to device  Request [id number]  Set Mode to AutoSet  Set Min Pressure to 8 cmH2O  Set Max Pressure to 12 cmH2O  Set Response to Soft  Set EPR to Fulltime  Set EPR level to 2  Set Ramp enable to Auto  Set Start pressure to 5.0 cmH2O  Set Climate Control to Manual  Set Humidifier level to Off  Set Tube temperature to Off  Nevaeh Jc, LEOPOLDO

## 2024-07-24 NOTE — PROGRESS NOTES
Sleep Apnea - Follow-up Visit:    Impression/Plan:     Severe Sleep apnea.   Tolerating PAP well. Daytime symptoms are improved.   - Narrow pressures to 8-12 cmH20        Shala Poole will follow up in about 2 year(s).       I spent 5 minutes with patient including counseling, and 10 minutes with chart review, and documentation          History of Present Illness:  Chief Complaint   Patient presents with    Study Results    CPAP Follow Up     New CPAP user.       Shala Poole presents for follow-up of their severe sleep apnea, managed with CPAP.     Patient was initially seen 3/2021 for fatigue (ESS 7), snoring, occasional morning headaches (2/week), recent night sweats, obesity, crowded oropharynx. STOPBANG 1-3.   - Sleep onset difficulties, suspect psychophysiologic insomnia.      A Polysomnogram was ordered but not completed    She re-presented 2/2024 with fatigue (ESS 4), snoring, occasional morning headaches, obesity, crowded oropharynx, large neck bruxism. Comorbid hypertension.   - Sleep onset difficulties (BENJAMIN 14), suspect psychophysiologic insomnia comorbid to depression and complicated by delayed sleep phase on weekends    Home Sleep ApneaTest 3/27/2024 (281 lb)  - Apnea/hypopnea index [AHI] of 44 events per hour.    - AHI was 62 per hour supine, 7 per hour prone, 47 per hour on left side, and 13 per hour on right side.   - Time with saturation less than or equal to 88% was 0.7 minutes. The lowest oxygen saturation was 86%.   - Percent of time spent: supine - 8%, prone - 4%, on left - 80%, on right - 6%.    Patient Shala Poole was set up at [facility] on April 9, 2024. Patient received a Resmed Airsense 11 Pressures were set at  7-18 cm H2O.    Morning headaches are resolved  Fatigue is improved     Do you use a CPAP Machine at home: Yes  Overall, on a scale of 0-10 how would you rate your CPAP (0 poor, 10 great): 8    What type of mask do you use: Nasal Mask  Is your mask comfortable:  Yes    Is your mask leaking: No    Do you notice snoring with mask on: No  Do you notice gasping arousals with mask on: No  Are you having significant oral or nasal dryness: No  Is the pressure setting comfortable: Yes    What is your typical bedtime: 10pm  How long does it take you to go to sleep on PAP therapy: 30-40 mins  What time do you typically get out of bed for the day: 7:00 am  How many hours on average per night are you using PAP therapy: 8hrs  How many hours are you sleeping per night: 8 hrs  Do you feel well rested in the morning: No      ResMed   Auto-PAP 7.0 - 18.0 cmH2O 30 day usage data:    100% of days with > 4 hours of use. 0/30 days with no use.   Average use 8' 18 minutes per day.   95%ile Leak 0.39 L/min.   CPAP 95% pressure 10.1 cm. Median 7.9   AHI 0.55 events per hour.       EPWORTH SLEEPINESS SCALE         7/24/2024     7:00 AM    Waverly Sleepiness Scale ( LINETTE Beltrán  4099-8014<br>ESS - [place]/English - Final version - 21 Nov 07 - [facility] [employer].)   Waverly Score (Sleep) 4       INSOMNIA SEVERITY INDEX (BENJAMIN)          7/24/2024     7:00 AM   Insomnia Severity Index (BENJAMIN)   Difficulty falling asleep 1   Difficulty staying asleep 0   Problems waking up too early 0   How SATISFIED/DISSATISFIED are you with your CURRENT sleep pattern? 1   How NOTICEABLE to others do you think your sleep problem is in terms of impairing the quality of your life? 0   How WORRIED/DISTRESSED are you about your current sleep problem? 0   To what extent do you consider your sleep problem to INTERFERE with your daily functioning (e.g. daytime fatigue, mood, ability to function at work/daily chores, concentration, memory, mood, etc.) CURRENTLY? 0   BENJAMIN Total Score 2       Guidelines for Scoring/Interpretation:  Total score categories:  0-7 = No clinically significant insomnia   8-14 = Subthreshold insomnia   15-21 = Clinical insomnia (moderate severity)  22-28 = Clinical insomnia (severe)  Used via courtesy of  "[email or web address].gov with permission from Silviano Barnett PhD., [facility]        Past medical/surgical history, family history, social history, medications and allergies were reviewed.        Problem List:  Patient Active Problem List    Diagnosis Date Noted    Sacroiliac joint pain 07/02/2024     Priority: Medium    ARIANNE (obstructive sleep apnea) 04/01/2024     Priority: Medium     Home Sleep ApneaTest 3/27/2024 (281 lb) - Apnea/hypopnea index [AHI] of 44 events per hour. AHI was 62 per hour supine, 7 per hour prone, 47 per hour on left side, and 13 per hour on right side. Time with saturation less than or equal to 88% was 0.7 minutes. The lowest oxygen saturation was 86%. Percent of time spent: supine - 8%, prone - 4%, on left - 80%, on right - 6%.      Essential hypertension 09/20/2022     Priority: Medium    Major depressive disorder, single episode, mild (H24) 01/16/2017     Priority: Medium    Morbid obesity (H) 03/29/2021     Priority: Low    Fatigue, unspecified type 02/02/2021     Priority: Low    Hearing aid worn 02/02/2021     Priority: Low    CARDIOVASCULAR SCREENING; LDL GOAL LESS THAN 160 01/16/2017     Priority: Low    Sensorineural hearing loss (SNHL) of both ears 10/16/2012     Priority: Low     since a child. Mother has it too.          /79   Pulse 95   Resp 16   Ht 1.74 m (5' 8.5\")   Wt 128.7 kg (283 lb 12.8 oz)   LMP  (LMP Unknown)   SpO2 98%   BMI 42.52 kg/m      "

## 2024-08-12 ENCOUNTER — OFFICE VISIT (OUTPATIENT)
Dept: AUDIOLOGY | Facility: CLINIC | Age: 44
End: 2024-08-12
Payer: COMMERCIAL

## 2024-08-12 DIAGNOSIS — H90.3 BILATERAL SENSORINEURAL HEARING LOSS: Primary | ICD-10-CM

## 2024-08-12 PROCEDURE — V5299 HEARING SERVICE: HCPCS | Performed by: AUDIOLOGIST

## 2024-08-12 NOTE — PROGRESS NOTES
AUDIOLOGY REPORT: HEARING AID RECHECK    SUBJECTIVE: Shala Poole is a 44 year old female, :  1980, was seen in the Audiology Clinic at [facility] on 24 for a return check of their hearing aids. Patient was unaccompanied to today's visit.       Background:   Patient is here today with the complaint of muffled hearing aids.     Procedures:       SIDE: Both    : Phonak    TYPE: Aepon[employer]o P50-13 ROXI    S/N:      R: [id number]      L: [id number]     WARRANTY: 10/5/2025    The domes with HP receivers were removed and the Cshells were placed back onto the hearing aids. Both vents were cleared which improved sound quality. Patient was provided a vent  and some waxguards. Biologic listening check found the hearing aids sounding crisp and clear.     Plan:   Patient will return as needed for hearing aid concerns.     NO CHARGE VISIT      [place] CCC-A  Licensed Audiologist #[id number]  2024

## 2024-08-13 ENCOUNTER — MYC REFILL (OUTPATIENT)
Dept: FAMILY MEDICINE | Facility: CLINIC | Age: 44
End: 2024-08-13
Payer: COMMERCIAL

## 2024-08-13 DIAGNOSIS — F32.0 MAJOR DEPRESSIVE DISORDER, SINGLE EPISODE, MILD (H): ICD-10-CM

## 2024-08-13 DIAGNOSIS — I10 ESSENTIAL HYPERTENSION: ICD-10-CM

## 2024-08-14 RX ORDER — AMLODIPINE BESYLATE 2.5 MG/1
2.5 TABLET ORAL DAILY
Qty: 90 TABLET | Refills: 0 | Status: SHIPPED | OUTPATIENT
Start: 2024-08-14

## 2024-08-14 RX ORDER — FLUOXETINE 40 MG/1
40 CAPSULE ORAL DAILY
Qty: 90 CAPSULE | Refills: 0 | Status: SHIPPED | OUTPATIENT
Start: 2024-08-14

## 2024-09-21 ENCOUNTER — HEALTH MAINTENANCE LETTER (OUTPATIENT)
Age: 44
End: 2024-09-21

## 2024-09-24 ENCOUNTER — OFFICE VISIT (OUTPATIENT)
Dept: AUDIOLOGY | Facility: CLINIC | Age: 44
End: 2024-09-24
Payer: COMMERCIAL

## 2024-09-24 DIAGNOSIS — H90.3 BILATERAL SENSORINEURAL HEARING LOSS: Primary | ICD-10-CM

## 2024-09-24 PROCEDURE — V5299 HEARING SERVICE: HCPCS | Performed by: AUDIOLOGIST

## 2024-09-24 NOTE — PROGRESS NOTES
AUDIOLOGY REPORT: HEARING AID RECHECK    SUBJECTIVE: Shala Poole is a 44 year old female, :  1980, was seen in the Audiology Clinic at [facility] on 24 for a return check of their hearing aids. Patient was unaccompanied to today's visit.     Background:   Patient is here today with the complaint of not working.     Procedures:       SIDE: Left    : [employer]    TYPE: Liqu[employer] P50-13T ROXI    S/N: [id number]     WARRANTY: 10/5/2025    The hearing aid could not be returned to function in the office. The hearing aid was sent to [employer] for in warranty repair.     Plan:   Patient will contacted [facility] [employer] for  when the hearing aid returns. Patient is aware the turn around time for Phonak has been as long as three weeks.      NO CHARGE VISIT      [place] CCC-GHADA  Licensed Audiologist #[id number]  2024

## 2024-10-07 SDOH — HEALTH STABILITY: PHYSICAL HEALTH: ON AVERAGE, HOW MANY DAYS PER WEEK DO YOU ENGAGE IN MODERATE TO STRENUOUS EXERCISE (LIKE A BRISK WALK)?: 1 DAY

## 2024-10-07 SDOH — HEALTH STABILITY: PHYSICAL HEALTH: ON AVERAGE, HOW MANY MINUTES DO YOU ENGAGE IN EXERCISE AT THIS LEVEL?: 10 MIN

## 2024-10-07 ASSESSMENT — SOCIAL DETERMINANTS OF HEALTH (SDOH): HOW OFTEN DO YOU GET TOGETHER WITH FRIENDS OR RELATIVES?: ONCE A WEEK

## 2024-10-10 ENCOUNTER — OFFICE VISIT (OUTPATIENT)
Dept: FAMILY MEDICINE | Facility: CLINIC | Age: 44
End: 2024-10-10
Payer: COMMERCIAL

## 2024-10-10 VITALS
DIASTOLIC BLOOD PRESSURE: 83 MMHG | TEMPERATURE: 98.5 F | OXYGEN SATURATION: 97 % | HEART RATE: 110 BPM | BODY MASS INDEX: 40.88 KG/M2 | HEIGHT: 69 IN | RESPIRATION RATE: 18 BRPM | SYSTOLIC BLOOD PRESSURE: 125 MMHG | WEIGHT: 276 LBS

## 2024-10-10 DIAGNOSIS — Z00.00 ROUTINE GENERAL MEDICAL EXAMINATION AT A HEALTH CARE FACILITY: Primary | ICD-10-CM

## 2024-10-10 DIAGNOSIS — R21 RASH AND NONSPECIFIC SKIN ERUPTION: ICD-10-CM

## 2024-10-10 DIAGNOSIS — F32.0 MAJOR DEPRESSIVE DISORDER, SINGLE EPISODE, MILD (H): ICD-10-CM

## 2024-10-10 DIAGNOSIS — I10 ESSENTIAL HYPERTENSION: ICD-10-CM

## 2024-10-10 DIAGNOSIS — Z13.1 SCREENING FOR DIABETES MELLITUS: ICD-10-CM

## 2024-10-10 DIAGNOSIS — N89.8 VAGINAL ITCHING: ICD-10-CM

## 2024-10-10 LAB
ALBUMIN SERPL BCG-MCNC: 4.2 G/DL (ref 3.5–5.2)
ALP SERPL-CCNC: 79 U/L (ref 40–150)
ALT SERPL W P-5'-P-CCNC: 14 U/L (ref 0–50)
ANION GAP SERPL CALCULATED.3IONS-SCNC: 12 MMOL/L (ref 7–15)
AST SERPL W P-5'-P-CCNC: 23 U/L (ref 0–45)
BILIRUB SERPL-MCNC: 0.4 MG/DL
BUN SERPL-MCNC: 14.4 MG/DL (ref 6–20)
CALCIUM SERPL-MCNC: 9.1 MG/DL (ref 8.8–10.4)
CHLORIDE SERPL-SCNC: 105 MMOL/L (ref 98–107)
CHOLEST SERPL-MCNC: 163 MG/DL
CLUE CELLS: ABNORMAL
CREAT SERPL-MCNC: 0.93 MG/DL (ref 0.51–0.95)
EGFRCR SERPLBLD CKD-EPI 2021: 78 ML/MIN/1.73M2
EST. AVERAGE GLUCOSE BLD GHB EST-MCNC: 94 MG/DL
FASTING STATUS PATIENT QL REPORTED: YES
FASTING STATUS PATIENT QL REPORTED: YES
GLUCOSE SERPL-MCNC: 89 MG/DL (ref 70–99)
HBA1C MFR BLD: 4.9 % (ref 0–5.6)
HCO3 SERPL-SCNC: 23 MMOL/L (ref 22–29)
HDLC SERPL-MCNC: 31 MG/DL
LDLC SERPL CALC-MCNC: 88 MG/DL
NONHDLC SERPL-MCNC: 132 MG/DL
POTASSIUM SERPL-SCNC: 3.6 MMOL/L (ref 3.4–5.3)
PROT SERPL-MCNC: 7.1 G/DL (ref 6.4–8.3)
SODIUM SERPL-SCNC: 140 MMOL/L (ref 135–145)
TRICHOMONAS, WET PREP: ABNORMAL
TRIGL SERPL-MCNC: 220 MG/DL
WBC'S/HIGH POWER FIELD, WET PREP: ABNORMAL
YEAST, WET PREP: ABNORMAL

## 2024-10-10 PROCEDURE — 80061 LIPID PANEL: CPT | Performed by: PHYSICIAN ASSISTANT

## 2024-10-10 PROCEDURE — 99214 OFFICE O/P EST MOD 30 MIN: CPT | Mod: 25 | Performed by: PHYSICIAN ASSISTANT

## 2024-10-10 PROCEDURE — 36415 COLL VENOUS BLD VENIPUNCTURE: CPT | Performed by: PHYSICIAN ASSISTANT

## 2024-10-10 PROCEDURE — 99396 PREV VISIT EST AGE 40-64: CPT | Mod: 25 | Performed by: PHYSICIAN ASSISTANT

## 2024-10-10 PROCEDURE — 83036 HEMOGLOBIN GLYCOSYLATED A1C: CPT | Performed by: PHYSICIAN ASSISTANT

## 2024-10-10 PROCEDURE — 90471 IMMUNIZATION ADMIN: CPT | Performed by: PHYSICIAN ASSISTANT

## 2024-10-10 PROCEDURE — 80053 COMPREHEN METABOLIC PANEL: CPT | Performed by: PHYSICIAN ASSISTANT

## 2024-10-10 PROCEDURE — 87210 SMEAR WET MOUNT SALINE/INK: CPT | Performed by: PHYSICIAN ASSISTANT

## 2024-10-10 PROCEDURE — 90656 IIV3 VACC NO PRSV 0.5 ML IM: CPT | Performed by: PHYSICIAN ASSISTANT

## 2024-10-10 RX ORDER — TRIAMCINOLONE ACETONIDE 1 MG/G
CREAM TOPICAL 2 TIMES DAILY
Qty: 30 G | Refills: 1 | Status: SHIPPED | OUTPATIENT
Start: 2024-10-10

## 2024-10-10 RX ORDER — AMLODIPINE BESYLATE 2.5 MG/1
2.5 TABLET ORAL DAILY
Qty: 90 TABLET | Refills: 3 | Status: SHIPPED | OUTPATIENT
Start: 2024-10-10

## 2024-10-10 RX ORDER — FLUOXETINE 40 MG/1
40 CAPSULE ORAL DAILY
Qty: 90 CAPSULE | Refills: 3 | Status: SHIPPED | OUTPATIENT
Start: 2024-10-10

## 2024-10-10 ASSESSMENT — PAIN SCALES - GENERAL: PAINLEVEL: NO PAIN (0)

## 2024-10-10 NOTE — PATIENT INSTRUCTIONS
Patient Education   Preventive Care Advice   This is general advice given by our system to help you stay healthy. However, your care team may have specific advice just for you. Please talk to your care team about your preventive care needs.  Nutrition  Eat 5 or more servings of fruits and vegetables each day.  Try wheat bread, brown rice and whole grain pasta (instead of white bread, rice, and pasta).  Get enough calcium and vitamin D. Check the label on foods and aim for 100% of the RDA (recommended daily allowance).  Lifestyle  Exercise at least 150 minutes each week  (30 minutes a day, 5 days a week).  Do muscle strengthening activities 2 days a week. These help control your weight and prevent disease.  No smoking.  Wear sunscreen to prevent skin cancer.  Have a dental exam and cleaning every 6 months.  Yearly exams  See your health care team every year to talk about:  Any changes in your health.  Any medicines your care team has prescribed.  Preventive care, family planning, and ways to prevent chronic diseases.  Shots (vaccines)   HPV shots (up to age 26), if you've never had them before.  Hepatitis B shots (up to age 59), if you've never had them before.  COVID-19 shot: Get this shot when it's due.  Flu shot: Get a flu shot every year.  Tetanus shot: Get a tetanus shot every 10 years.  Pneumococcal, hepatitis A, and RSV shots: Ask your care team if you need these based on your risk.  Shingles shot (for age 50 and up)  General health tests  Diabetes screening:  Starting at age 35, Get screened for diabetes at least every 3 years.  If you are younger than age 35, ask your care team if you should be screened for diabetes.  Cholesterol test: At age 39, start having a cholesterol test every 5 years, or more often if advised.  Bone density scan (DEXA): At age 50, ask your care team if you should have this scan for osteoporosis (brittle bones).  Hepatitis C: Get tested at least once in your life.  STIs (sexually  transmitted infections)  Before age 24: Ask your care team if you should be screened for STIs.  After age 24: Get screened for STIs if you're at risk. You are at risk for STIs (including HIV) if:  You are sexually active with more than one person.  You don't use condoms every time.  You or a partner was diagnosed with a sexually transmitted infection.  If you are at risk for HIV, ask about PrEP medicine to prevent HIV.  Get tested for HIV at least once in your life, whether you are at risk for HIV or not.  Cancer screening tests  Cervical cancer screening: If you have a cervix, begin getting regular cervical cancer screening tests starting at age 21.  Breast cancer scan (mammogram): If you've ever had breasts, begin having regular mammograms starting at age 40. This is a scan to check for breast cancer.  Colon cancer screening: It is important to start screening for colon cancer at age 45.  Have a colonoscopy test every 10 years (or more often if you're at risk) Or, ask your provider about stool tests like a FIT test every year or Cologuard test every 3 years.  To learn more about your testing options, visit:   .  For help making a decision, visit:   https://bit.ly/bh22309.  Prostate cancer screening test: If you have a prostate, ask your care team if a prostate cancer screening test (PSA) at age 55 is right for you.  Lung cancer screening: If you are a current or former smoker ages 50 to 80, ask your care team if ongoing lung cancer screenings are right for you.  For informational purposes only. Not to replace the advice of your health care provider. Copyright   2023 [facility]. All rights reserved. Clinically reviewed by the [facility] Transitions Program. [employer] 947807 - REV 01/24.  Learning About Stress  What is stress?     Stress is your body's response to a hard situation. Your body can have a physical, emotional, or mental response. Stress is a fact of life for most people, and it  affects everyone differently. What causes stress for you may not be stressful for someone else.  A lot of things can cause stress. You may feel stress when you go on a job interview, take a test, or run a race. This kind of short-term stress is normal and even useful. It can help you if you need to work hard or react quickly. For example, stress can help you finish an important job on time.  Long-term stress is caused by ongoing stressful situations or events. Examples of long-term stress include long-term health problems, ongoing problems at work, or conflicts in your family. Long-term stress can harm your health.  How does stress affect your health?  When you are stressed, your body responds as though you are in danger. It makes hormones that speed up your heart, make you breathe faster, and give you a burst of energy. This is called the fight-or-flight stress response. If the stress is over quickly, your body goes back to normal and no harm is done.  But if stress happens too often or lasts too long, it can have bad effects. Long-term stress can make you more likely to get sick, and it can make symptoms of some diseases worse. If you tense up when you are stressed, you may develop neck, shoulder, or low back pain. Stress is linked to high blood pressure and heart disease.  Stress also harms your emotional health. It can make you johnson, tense, or depressed. Your relationships may suffer, and you may not do well at work or school.  What can you do to manage stress?  You can try these things to help manage stress:   Do something active. Exercise or activity can help reduce stress. Walking is a great way to get started. Even everyday activities such as housecleaning or yard work can help.  Try yoga or indy chi. These techniques combine exercise and meditation. You may need some training at first to learn them.  Do something you enjoy. For example, listen to music or go to a movie. Practice your hobby or do volunteer  "work.  Meditate. This can help you relax, because you are not worrying about what happened before or what may happen in the future.  Do guided imagery. Imagine yourself in any setting that helps you feel calm. You can use online videos, books, or a teacher to guide you.  Do breathing exercises. For example:  From a standing position, bend forward from the waist with your knees slightly bent. Let your arms dangle close to the floor.  Breathe in slowly and deeply as you return to a standing position. Roll up slowly and lift your head last.  Hold your breath for just a few seconds in the standing position.  Breathe out slowly and bend forward from the waist.  Let your feelings out. Talk, laugh, cry, and express anger when you need to. Talking with supportive friends or family, a counselor, or a chaparro leader about your feelings is a healthy way to relieve stress. Avoid discussing your feelings with people who make you feel worse.  Write. It may help to write about things that are bothering you. This helps you find out how much stress you feel and what is causing it. When you know this, you can find better ways to cope.  What can you do to prevent stress?  You might try some of these things to help prevent stress:  Manage your time. This helps you find time to do the things you want and need to do.  Get enough sleep. Your body recovers from the stresses of the day while you are sleeping.  Get support. Your family, friends, and community can make a difference in how you experience stress.  Limit your news feed. Avoid or limit time on social media or news that may make you feel stressed.  Do something active. Exercise or activity can help reduce stress. Walking is a great way to get started.  Where can you learn more?  Go to https://www.XMLAW.net/patiented  Enter N032 in the search box to learn more about \"Learning About Stress.\"  Current as of: October 24, 2023  Content Version: 14.2 2024 [employer] "   Care instructions adapted under license by your healthcare professional. If you have questions about a medical condition or this instruction, always ask your healthcare professional. [employer] disclaims any warranty or liability for your use of this information.

## 2024-10-10 NOTE — PROGRESS NOTES
"Preventive Care Visit  [facility] Kristen M. Kehr, PA-C, Family Medicine  Oct 10, 2024      Assessment & Plan     Routine general medical examination at a health care facility  Health maintenance reviewed and updated.  - Lipid panel reflex to direct LDL Fasting; Future  - Lipid panel reflex to direct LDL Fasting    Essential hypertension  Condition is stable, refills given   - Comprehensive metabolic panel (BMP + Alb, Alk Phos, ALT, AST, Total. Bili, TP); Future  - amLODIPine (NORVASC) 2.5 MG tablet; Take 1 tablet (2.5 mg) by mouth daily.  - Comprehensive metabolic panel (BMP + Alb, Alk Phos, ALT, AST, Total. Bili, TP)    Vaginal itching  Wet prep is negative, no treatment indicated  - Wet prep - Clinic Collect    Major depressive disorder, single episode, mild (H)  Doing well, refills given   - FLUoxetine (PROZAC) 20 MG capsule; Take 1 capsule (20 mg) by mouth daily. Take with 40 for total of 60  - FLUoxetine (PROZAC) 40 MG capsule; Take 1 capsule (40 mg) by mouth daily.    Rash and nonspecific skin eruption  - triamcinolone (KENALOG) 0.1 % external cream; Apply topically 2 times daily.    Screening for diabetes mellitus  - Hemoglobin A1c; Future  - Hemoglobin A1c            BMI  Estimated body mass index is 40.76 kg/m  as calculated from the following:    Height as of this encounter: 1.753 m (5' 9\").    Weight as of this encounter: 125.2 kg (276 lb).   Weight management plan: Patient referred to endocrine and/or weight management specialty    Counseling  Appropriate preventive services were addressed with this patient via screening, questionnaire, or discussion as appropriate for fall prevention, nutrition, physical activity, Tobacco-use cessation, social engagement, weight loss and cognition.  Checklist reviewing preventive services available has been given to the patient.  Reviewed patient's diet, addressing concerns and/or questions.   She is at risk for lack of exercise and has been " provided with information to increase physical activity for the benefit of her well-being.           Valerie Last is a 44 year old, presenting for the following:  Physical        10/10/2024     7:09 AM   Additional Questions   Roomed by Thomas VARGAS MA        Health Care Directive  Patient does not have a Health Care Directive or Living Will: Patient states has Advance Directive and will bring in a copy to clinic.    TABITHA Last is here today for preventative care and to address the following:    Hypertension: managed with amlodipine 2.5 mg daily  Depression: taking prozac 60 mg daily  She has had vaginal itching off and on and used over the counter medication. No symptoms today, but concerned about ongoing bacterial / vaginal infection.                   10/7/2024   General Health   How would you rate your overall physical health? Good   Feel stress (tense, anxious, or unable to sleep) Very much      (!) STRESS CONCERN      10/7/2024   Nutrition   Three or more servings of calcium each day? Yes   Diet: Regular (no restrictions)   How many servings of fruit and vegetables per day? (!) 0-1   How many sweetened beverages each day? (!) 2            10/7/2024   Exercise   Days per week of moderate/strenous exercise 1 day   Average minutes spent exercising at this level 10 min      (!) EXERCISE CONCERN      10/7/2024   Social Factors   Frequency of gathering with friends or relatives Once a week   Worry food won't last until get money to buy more No   Food not last or not have enough money for food? No   Do you have housing? (Housing is defined as stable permanent housing and does not include staying ouside in a car, in a tent, in an abandoned building, in an overnight shelter, or couch-surfing.) Yes   Are you worried about losing your housing? No   Lack of transportation? No   Unable to get utilities (heat,electricity)? No            10/7/2024   Dental   Dentist two times every year? Yes            10/7/2024   TB  Screening   Were you born outside of the [place]? No                    10/7/2024   Substance Use   Alcohol more than 3/day or more than 7/wk No   Do you use any other substances recreationally? No        Social History     Tobacco Use    Smoking status: Never     Passive exposure: Never    Smokeless tobacco: Never   Vaping Use    Vaping status: Never Used   Substance Use Topics    Alcohol use: Yes     Comment: Some weekends a couple drinks socially    Drug use: No           10/7/2023   LAST FHS-7 RESULTS   1st degree relative breast or ovarian cancer No   Any relative bilateral breast cancer No   Any male have breast cancer No   Any ONE woman have BOTH breast AND ovarian cancer No   Any woman with breast cancer before 50yrs No   2 or more relatives with breast AND/OR ovarian cancer Yes   2 or more relatives with breast AND/OR bowel cancer No           Mammogram Screening - Mammogram every 1-2 years updated in Health Maintenance based on mutual decision making        10/7/2024   STI Screening   New sexual partner(s) since last STI/HIV test? No        History of abnormal Pap smear: Status post hysterectomy with removal of cervix and no history of CIN2 or greater or cervical cancer. Health Maintenance and Surgical History updated.       ASCVD Risk   The 10-year ASCVD risk score (Kyle HDEZ, et al., 2019) is: 1.5%    Values used to calculate the score:      Age: 44 years      Sex: Female      Is Non- : No      Diabetic: No      Tobacco smoker: No      Systolic Blood Pressure: 125 mmHg      Is BP treated: Yes      HDL Cholesterol: 39 mg/dL      Total Cholesterol: 189 mg/dL       Reviewed and updated as needed this visit by Provider                    Past Medical History:   Diagnosis Date    Benign essential hypertension     Depressive disorder      Past Surgical History:   Procedure Laterality Date    DENTAL SURGERY  1999    wisdom teeth    HYSTERECTOMY, PAP NO LONGER INDICATED  12/18/2013     due to excessive bleeding - ovaries remain    ORTHOPEDIC SURGERY      cyst removal from left wrist     SALPINGECTOMY  2013    ROBOTIC ASSISTED BILATERAL SALPINGECTOMY    TUBAL LIGATION       OB History    Para Term  AB Living   3 0 0 0 0 3   SAB IAB Ectopic Multiple Live Births   0 0 0 0 3      # Outcome Date GA Lbr Yves/2nd Weight Sex Type Anes PTL Lv   3  07   3.544 kg (7 lb 13 oz) F    BOBBY   2  05/10/05 39w0d 10:00 3.685 kg (8 lb 2 oz) M    BOBBY      Birth Comments: Induced due to size of baby.  No other complications.  Wt gain of 20#.      Name: Forrest   1  03 40w0d 18:00 3.657 kg (8 lb 1 oz) M Vag-Vacuum   BOBBY      Birth Comments: No complications.  Wt gain of 40#.      Name: Deon      Obstetric Comments   3 children.  Vaginal.      BP Readings from Last 3 Encounters:   10/10/24 125/83   24 119/79   24 122/80    Wt Readings from Last 3 Encounters:   10/10/24 125.2 kg (276 lb)   24 128.7 kg (283 lb 12.8 oz)   24 127 kg (280 lb)                  Patient Active Problem List   Diagnosis    Major depressive disorder, single episode, mild (H)    CARDIOVASCULAR SCREENING; LDL GOAL LESS THAN 160    Fatigue, unspecified type    Hearing aid worn    Sensorineural hearing loss (SNHL) of both ears    Morbid obesity (H)    Essential hypertension    ARIANNE (obstructive sleep apnea)    Sacroiliac joint pain     Past Surgical History:   Procedure Laterality Date    DENTAL SURGERY      wisdom teeth    HYSTERECTOMY, PAP NO LONGER INDICATED  2013    due to excessive bleeding - ovaries remain    ORTHOPEDIC SURGERY      cyst removal from left wrist     SALPINGECTOMY  2013    ROBOTIC ASSISTED BILATERAL SALPINGECTOMY    TUBAL LIGATION  2007       Social History     Tobacco Use    Smoking status: Never     Passive exposure: Never    Smokeless tobacco: Never   Substance Use Topics    Alcohol use: Yes     Comment: Some weekends a  "couple drinks socially     Family History   Problem Relation Age of Onset    Other - See Comments Mother         Jesenia.     Thyroid Disease Mother     Depression Mother     Diabetes Father     Chronic Obstructive Pulmonary Disease Father     Asthma Father     Heart Failure Father     Obesity Father     Ovarian Cancer Maternal Grandfather     Diabetes Paternal Grandmother     Colon Cancer Other          Current Outpatient Medications   Medication Sig Dispense Refill    acetaminophen (TYLENOL) 325 MG tablet Take 325-650 mg by mouth      amLODIPine (NORVASC) 2.5 MG tablet Take 1 tablet (2.5 mg) by mouth daily. 90 tablet 3    FLUoxetine (PROZAC) 20 MG capsule Take 1 capsule (20 mg) by mouth daily. Take with 40 for total of 60 90 capsule 3    FLUoxetine (PROZAC) 40 MG capsule Take 1 capsule (40 mg) by mouth daily. 90 capsule 3    Semaglutide-Weight Management (WEGOVY) 0.5 MG/0.5ML pen Inject 0.5 mg subcutaneously once a week 2 mL 5    triamcinolone (KENALOG) 0.1 % external cream Apply topically 2 times daily. 30 g 1     No Known Allergies  Recent Labs   Lab Test 10/10/24  0810 09/20/22  0802 04/16/22  1108 02/02/21  1724 04/13/19  0917   A1C 4.9  --  5.1 4.8  --    LDL  --   --  111* 109* 95   HDL  --   --  39* 37* 34*   TRIG  --   --  196* 296* 253*   ALT  --   --  23  --   --    CR  --  0.90 0.89 0.96  --    GFRESTIMATED  --  82 83 74  --    GFRESTBLACK  --   --   --  85  --    POTASSIUM  --  3.5 4.4 3.7  --    TSH  --  1.90  --  2.60  --           Review of Systems  Constitutional, HEENT, cardiovascular, pulmonary, GI, , musculoskeletal, neuro, skin, endocrine and psych systems are negative, except as otherwise noted.     Objective    Exam  /83   Pulse 110   Temp 98.5  F (36.9  C) (Tympanic)   Resp 18   Ht 1.753 m (5' 9\")   Wt 125.2 kg (276 lb)   LMP  (LMP Unknown)   SpO2 97%   Breastfeeding No   BMI 40.76 kg/m     Estimated body mass index is 40.76 kg/m  as calculated from the following:    " "Height as of this encounter: 1.753 m (5' 9\").    Weight as of this encounter: 125.2 kg (276 lb).    Physical Exam  GENERAL: alert and no distress  EYES: Eyes grossly normal to inspection, PERRL and conjunctivae and sclerae normal  HENT: ear canals and TM's normal, nose and mouth without ulcers or lesions  NECK: no adenopathy, no asymmetry, masses, or scars  RESP: lungs clear to auscultation - no rales, rhonchi or wheezes  BREAST: normal without masses, tenderness or nipple discharge and no palpable axillary masses or adenopathy  CV: regular rate and rhythm, normal S1 S2, no S3 or S4, no murmur, click or rub, no peripheral edema  ABDOMEN: soft, nontender, no hepatosplenomegaly, no masses and bowel sounds normal   (female): normal female external genitalia, normal urethral meatus, normal vaginal mucosa  MS: no gross musculoskeletal defects noted, no edema  SKIN: no suspicious lesions or rashes  NEURO: Normal strength and tone, mentation intact and speech normal  PSYCH: mentation appears normal, affect normal/bright        Signed Electronically by: Kristen M. Kehr, PA-C    " no abdominal pain, no bloating, no constipation, no diarrhea, no nausea and no vomiting.

## 2024-10-21 PROBLEM — M53.3 SACROILIAC JOINT PAIN: Status: RESOLVED | Noted: 2024-07-02 | Resolved: 2024-10-21

## 2024-11-05 ENCOUNTER — OFFICE VISIT (OUTPATIENT)
Dept: FAMILY MEDICINE | Facility: CLINIC | Age: 44
End: 2024-11-05
Payer: COMMERCIAL

## 2024-11-05 ENCOUNTER — MYC MEDICAL ADVICE (OUTPATIENT)
Dept: FAMILY MEDICINE | Facility: CLINIC | Age: 44
End: 2024-11-05

## 2024-11-05 ENCOUNTER — TELEPHONE (OUTPATIENT)
Dept: FAMILY MEDICINE | Facility: CLINIC | Age: 44
End: 2024-11-05

## 2024-11-05 VITALS
SYSTOLIC BLOOD PRESSURE: 129 MMHG | RESPIRATION RATE: 18 BRPM | HEART RATE: 109 BPM | TEMPERATURE: 98.3 F | WEIGHT: 270 LBS | HEIGHT: 69 IN | BODY MASS INDEX: 39.99 KG/M2 | OXYGEN SATURATION: 100 % | DIASTOLIC BLOOD PRESSURE: 85 MMHG

## 2024-11-05 DIAGNOSIS — B35.1 ONYCHOMYCOSIS: Primary | ICD-10-CM

## 2024-11-05 PROCEDURE — 99213 OFFICE O/P EST LOW 20 MIN: CPT | Performed by: PHYSICIAN ASSISTANT

## 2024-11-05 RX ORDER — TERBINAFINE HYDROCHLORIDE 250 MG/1
250 TABLET ORAL DAILY
Qty: 90 TABLET | Refills: 0 | Status: SHIPPED | OUTPATIENT
Start: 2024-11-05 | End: 2025-02-03

## 2024-11-05 ASSESSMENT — PAIN SCALES - GENERAL: PAINLEVEL_OUTOF10: MILD PAIN (3)

## 2024-11-05 NOTE — PROGRESS NOTES
"  Assessment & Plan     Onychomycosis  Treat with lamisil. Side effects and how to take the medication discussed.  She will have liver tests at 6 weeks.   Discussed expectations of medication and giving time for the nails to grow, aprox 6-12 months.   If she is not seeing healthy nail at the base within the first 6 months, consider appointment with Dermatology.   - terbinafine (LAMISIL) 250 MG tablet; Take 1 tablet (250 mg) by mouth daily.  - Adult Dermatology  Referral; Future                Subjective   Shala is a 44 year old, presenting for the following health issues:  Ingrown Toenail (Left 1st toe)      11/5/2024     7:10 AM   Additional Questions   Roomed by Thomas VARGAS MA     History of Present Illness       Reason for visit:  Toe/Nail Pain Nail Fungus  Symptom onset:  1-2 weeks ago  Symptoms include:  Pain in left big toe. Yellowing of nail and cumbling of nail  Symptom intensity:  Mild  Symptom progression:  Staying the same  Had these symptoms before:  Yes  Has tried/received treatment for these symptoms:  Yes  Previous treatment was successful:  Yes  Prior treatment description:  Pills to clear up nail fungus  What makes it worse:  Pressure on the toe  What makes it better:  No pressure on the toe   She is taking medications regularly.     Shala is here today for toenail changes. She has noticed this for a few weeks. There are nails on her feet that have become thick and the great nail is flaking.             Review of Systems  Constitutional, HEENT, cardiovascular, pulmonary, GI, , musculoskeletal, neuro, skin, endocrine and psych systems are negative, except as otherwise noted.      Objective    /85   Pulse 109   Temp 98.3  F (36.8  C) (Tympanic)   Resp 18   Ht 1.753 m (5' 9\")   Wt 122.5 kg (270 lb)   LMP  (LMP Unknown)   SpO2 100%   Breastfeeding No   BMI 39.87 kg/m    Body mass index is 39.87 kg/m .  Physical Exam   GENERAL: alert and no distress  SKIN: nails: yellow " discoloration of all nails on her feet and some on her hands. Great nail on right foot is thick and flaking top layer.   PSYCH: mentation appears normal, affect normal/bright            Signed Electronically by: Kristen M. Kehr, PA-C

## 2024-11-05 NOTE — TELEPHONE ENCOUNTER
Retail Pharmacy Prior Authorization Team   Phone: [phone]    PRIOR AUTHORIZATION DENIED    Medication: TERBINAFINE  MG PO TABS  Insurance Company: [employer] - Phone [phone] Fax [phone]  Denial Date: 11/5/2024  Denial Reason(s): COVERED FOR FUNGAL INFECTION OF THE NAIL WHEN A TEST IS USED TO CONFIRM THE DX      Appeal Information: IF THE PROVIDER WOULD LIKE TO APPEAL THIS DECISION PLEASE PROVIDE THE PA TEAM WITH A LETTER OF MEDICAL NECESSITY      Patient Notified: NO

## 2024-11-25 ENCOUNTER — VIRTUAL VISIT (OUTPATIENT)
Dept: ENDOCRINOLOGY | Facility: CLINIC | Age: 44
End: 2024-11-25
Payer: COMMERCIAL

## 2024-11-25 VITALS — BODY MASS INDEX: 39.4 KG/M2 | HEIGHT: 69 IN | WEIGHT: 266 LBS

## 2024-11-25 DIAGNOSIS — E66.01 MORBID OBESITY (H): Primary | ICD-10-CM

## 2024-11-25 DIAGNOSIS — I10 ESSENTIAL HYPERTENSION: ICD-10-CM

## 2024-11-25 ASSESSMENT — PAIN SCALES - GENERAL: PAINLEVEL_OUTOF10: NO PAIN (0)

## 2024-11-25 NOTE — PROGRESS NOTES
"    Return Medical Weight Management Note     Shala Poole  MRN:  [id number]  :  1980  DANNIELLE:  2024    Dear Kristen M. Kehr, PA-C,    I had the pleasure of seeing your patient Shala Poole.  She is a 44 year old female who I am continuing to see for treatment of obesity related to:      2022     8:35 PM   --   I have the following health issues associated with obesity High Blood Pressure    GERD (Reflux)   I have the following symptoms associated with obesity Knee Pain    Depression    Back Pain    Fatigue    Hip Pain     CURRENT WEIGHT:   266 lbs 0 oz    Wt Readings from Last 4 Encounters:   24 120.7 kg (266 lb)   24 122.5 kg (270 lb)   10/10/24 125.2 kg (276 lb)   24 128.7 kg (283 lb 12.8 oz)     Height:  5' 9.016\"  Body Mass Index:  Body mass index is 39.26 kg/m .  Vitals:  B/P: Data Unavailable, P: Data Unavailable    Initial consult weight was 276 on 22.  Weight change since last seen on 2024 is down14 pounds.   Total loss is 10 pounds.    INTERVAL HISTORY:  Was shila to get Wegovy covered by insurance, now on 0.5/w. No side effects of note and helping with food thoughts..        2022     8:35 PM   Diet Recall Review with Patient   Do you typically eat breakfast? No   Do you typically eat lunch? Yes   If you do eat lunch, what types of food do you typically eat? Fast food, frozen pizza   Do you typically eat supper? Yes   If you do eat supper, what types of food do you typically eat? Meat and potatoes, pasta,   Do you typically eat snacks? Yes   If you do snack, what types of food do you typically eat? Chips and cheese, cookies, beef jerky, popcorn, cheese sticks,   Do you like vegetables?  Yes   Do you drink water? Yes   How many glasses of juice do you drink in a typical day? 0   How many of glasses of milk do you drink in a typical day? 0   If you do drink milk, what type? N/A   How many 8oz glasses of sugar containing drinks such as Abel-Aid/sweet " tea do you drink in a day? 0   How many cans/bottles of sugar pop/soda/tea/sports drinks do you drink in a day? 3   How many cans/bottles of diet pop/soda/tea or sports drink do you drink in a day? 2   How often do you have a drink of alcohol? 2-4 Times a Month   If you do drink, how many drinks might you have in a day? 3-4     MEDICATIONS:   Current Outpatient Medications   Medication Sig Dispense Refill    acetaminophen (TYLENOL) 325 MG tablet Take 325-650 mg by mouth      amLODIPine (NORVASC) 2.5 MG tablet Take 1 tablet (2.5 mg) by mouth daily. 90 tablet 3    FLUoxetine (PROZAC) 20 MG capsule Take 1 capsule (20 mg) by mouth daily. Take with 40 for total of 60 90 capsule 3    FLUoxetine (PROZAC) 40 MG capsule Take 1 capsule (40 mg) by mouth daily. 90 capsule 3    Semaglutide-Weight Management (WEGOVY) 0.5 MG/0.5ML pen Inject 0.5 mg subcutaneously once a week 2 mL 5    terbinafine (LAMISIL) 250 MG tablet Take 1 tablet (250 mg) by mouth daily. 90 tablet 0    triamcinolone (KENALOG) 0.1 % external cream Apply topically 2 times daily. 30 g 1     No current facility-administered medications for this visit.         11/20/2024     9:35 AM   Weight Loss Medication History Reviewed With Patient   Which weight loss medications are you currently taking on a regular basis? Wegovy   Are you having any side effects from the weight loss medication that we have prescribed you? No     ASSESSMENT:   Increase Wegovy to 1/w. Reinforce food plan.    FOLLOW-UP:    12 weeks.    Sincerely,  Silviano Redd MD

## 2024-11-25 NOTE — NURSING NOTE
Current patient location:  at work    Is the patient currently in the state of [place]? YES    Visit mode:VIDEO    If the visit is dropped, the patient can be reconnected by:VIDEO VISIT: Text to cell phone:   Telephone Information:   Mobile [phone]    and VIDEO VISIT: Send to e-mail at: [email or web address]    Will anyone else be joining the visit? NO  (If patient encounters technical issues they should call [phone])    Are changes needed to the allergy or medication list? No    Are refills needed on medications prescribed by this physician? NO    Rooming Documentation:  Questionnaire(s) completed    Reason for visit: PATRICE SYF

## 2024-11-25 NOTE — PROGRESS NOTES
Virtual Visit Details    Joined the call at 11/25/2024, 8:22:36 am.  Left the call at 11/25/2024, 8:28:16 am.    Originating Location (pt. Location): Home    Distant Location (provider location):  Off-site  Platform used for Video Visit: Danielle

## 2024-11-25 NOTE — LETTER
"2024       RE: Shala Poole  [address]  [facility] [address]     Dear Colleague,    Thank you for referring your patient, Shala Poole, to the [facility] [place] at [facility]. Please see a copy of my visit note below.    Virtual Visit Details    Joined the call at 2024, 8:22:36 am.  Left the call at 2024, 8:28:16 am.    Originating Location (pt. Location): Home    Distant Location (provider location):  Off-site  Platform used for Video Visit: [facility] Medical Weight Management Note     Shala Poole  MRN:  [id number]  :  1980  DANNIELLE:  2024    Dear Kristen M. Kehr, PA-C,    I had the pleasure of seeing your patient Shala Poole.  She is a 44 year old female who I am continuing to see for treatment of obesity related to:      2022     8:35 PM   --   I have the following health issues associated with obesity High Blood Pressure    GERD (Reflux)   I have the following symptoms associated with obesity Knee Pain    Depression    Back Pain    Fatigue    Hip Pain     CURRENT WEIGHT:   266 lbs 0 oz    Wt Readings from Last 4 Encounters:   24 120.7 kg (266 lb)   24 122.5 kg (270 lb)   10/10/24 125.2 kg (276 lb)   24 128.7 kg (283 lb 12.8 oz)     Height:  5' 9.016\"  Body Mass Index:  Body mass index is 39.26 kg/m .  Vitals:  B/P: Data Unavailable, P: Data Unavailable    Initial consult weight was 276 on 22.  Weight change since last seen on 2024 is down14 pounds.   Total loss is 10 pounds.    INTERVAL HISTORY:  Was shila to get Wegovy covered by insurance, now on 0.5/w. No side effects of note and helping with food thoughts..        2022     8:35 PM   Diet Recall Review with Patient   Do you typically eat breakfast? No   Do you typically eat lunch? Yes   If you do eat lunch, what types of food do you typically eat? Fast food, frozen pizza "   Do you typically eat supper? Yes   If you do eat supper, what types of food do you typically eat? Meat and potatoes, pasta,   Do you typically eat snacks? Yes   If you do snack, what types of food do you typically eat? Chips and cheese, cookies, beef jerky, popcorn, cheese sticks,   Do you like vegetables?  Yes   Do you drink water? Yes   How many glasses of juice do you drink in a typical day? 0   How many of glasses of milk do you drink in a typical day? 0   If you do drink milk, what type? N/A   How many 8oz glasses of sugar containing drinks such as Abel-Aid/sweet tea do you drink in a day? 0   How many cans/bottles of sugar pop/soda/tea/sports drinks do you drink in a day? 3   How many cans/bottles of diet pop/soda/tea or sports drink do you drink in a day? 2   How often do you have a drink of alcohol? 2-4 Times a Month   If you do drink, how many drinks might you have in a day? 3-4     MEDICATIONS:   Current Outpatient Medications   Medication Sig Dispense Refill     acetaminophen (TYLENOL) 325 MG tablet Take 325-650 mg by mouth       amLODIPine (NORVASC) 2.5 MG tablet Take 1 tablet (2.5 mg) by mouth daily. 90 tablet 3     FLUoxetine (PROZAC) 20 MG capsule Take 1 capsule (20 mg) by mouth daily. Take with 40 for total of 60 90 capsule 3     FLUoxetine (PROZAC) 40 MG capsule Take 1 capsule (40 mg) by mouth daily. 90 capsule 3     Semaglutide-Weight Management (WEGOVY) 0.5 MG/0.5ML pen Inject 0.5 mg subcutaneously once a week 2 mL 5     terbinafine (LAMISIL) 250 MG tablet Take 1 tablet (250 mg) by mouth daily. 90 tablet 0     triamcinolone (KENALOG) 0.1 % external cream Apply topically 2 times daily. 30 g 1     No current facility-administered medications for this visit.         11/20/2024     9:35 AM   Weight Loss Medication History Reviewed With Patient   Which weight loss medications are you currently taking on a regular basis? Wegovy   Are you having any side effects from the weight loss medication that we  have prescribed you? No     ASSESSMENT:   Increase Wegovy to 1/w. Reinforce food plan.    FOLLOW-UP:    12 weeks.    Sincerely,  Silviano Redd MD      Again, thank you for allowing me to participate in the care of your patient.      Sincerely,    Silviano Redd MD

## 2024-12-12 DIAGNOSIS — I10 ESSENTIAL HYPERTENSION: ICD-10-CM

## 2024-12-12 DIAGNOSIS — E66.01 MORBID OBESITY (H): ICD-10-CM

## 2024-12-17 RX ORDER — SEMAGLUTIDE 1 MG/.5ML
1 INJECTION, SOLUTION SUBCUTANEOUS WEEKLY
Qty: 2 ML | Refills: 4 | Status: SHIPPED | OUTPATIENT
Start: 2024-12-17

## 2024-12-17 NOTE — TELEPHONE ENCOUNTER
Semaglutide-Weight Management (WEGOVY) 1 MG/0.5ML pen 2 mL 0 11/25/2024     Last Office Visit: 11/25/24  Future Office visit:   4/28/25    Creatinine   Date Value Ref Range Status   10/10/2024 0.93 0.51 - 0.95 mg/dL Final   02/02/2021 0.96 0.52 - 1.04 mg/dL Final       Jessica Guy RN  [facility]/Red Flag Triage & Med Refill Team

## 2024-12-28 ENCOUNTER — ANCILLARY PROCEDURE (OUTPATIENT)
Dept: MAMMOGRAPHY | Facility: CLINIC | Age: 44
End: 2024-12-28
Attending: PHYSICIAN ASSISTANT
Payer: COMMERCIAL

## 2024-12-28 DIAGNOSIS — Z12.31 VISIT FOR SCREENING MAMMOGRAM: ICD-10-CM

## 2025-01-04 ENCOUNTER — LAB (OUTPATIENT)
Dept: LAB | Facility: CLINIC | Age: 45
End: 2025-01-04
Payer: COMMERCIAL

## 2025-01-04 DIAGNOSIS — B35.1 ONYCHOMYCOSIS: ICD-10-CM

## 2025-01-04 LAB
ALBUMIN SERPL BCG-MCNC: 4.2 G/DL (ref 3.5–5.2)
ALP SERPL-CCNC: 81 U/L (ref 40–150)
ALT SERPL W P-5'-P-CCNC: 66 U/L (ref 0–50)
AST SERPL W P-5'-P-CCNC: 59 U/L (ref 0–45)
BILIRUB DIRECT SERPL-MCNC: <0.2 MG/DL (ref 0–0.3)
BILIRUB SERPL-MCNC: 0.6 MG/DL
PROT SERPL-MCNC: 7.4 G/DL (ref 6.4–8.3)

## 2025-01-04 PROCEDURE — 36415 COLL VENOUS BLD VENIPUNCTURE: CPT

## 2025-01-04 PROCEDURE — 80076 HEPATIC FUNCTION PANEL: CPT

## 2025-01-06 ASSESSMENT — PATIENT HEALTH QUESTIONNAIRE - PHQ9
SUM OF ALL RESPONSES TO PHQ QUESTIONS 1-9: 2
10. IF YOU CHECKED OFF ANY PROBLEMS, HOW DIFFICULT HAVE THESE PROBLEMS MADE IT FOR YOU TO DO YOUR WORK, TAKE CARE OF THINGS AT HOME, OR GET ALONG WITH OTHER PEOPLE: NOT DIFFICULT AT ALL
SUM OF ALL RESPONSES TO PHQ QUESTIONS 1-9: 2

## 2025-01-07 ENCOUNTER — OFFICE VISIT (OUTPATIENT)
Dept: FAMILY MEDICINE | Facility: CLINIC | Age: 45
End: 2025-01-07
Payer: COMMERCIAL

## 2025-01-07 ENCOUNTER — ANCILLARY PROCEDURE (OUTPATIENT)
Dept: GENERAL RADIOLOGY | Facility: CLINIC | Age: 45
End: 2025-01-07
Attending: PHYSICIAN ASSISTANT
Payer: COMMERCIAL

## 2025-01-07 VITALS
WEIGHT: 257 LBS | OXYGEN SATURATION: 100 % | RESPIRATION RATE: 20 BRPM | TEMPERATURE: 98.4 F | DIASTOLIC BLOOD PRESSURE: 84 MMHG | HEART RATE: 112 BPM | SYSTOLIC BLOOD PRESSURE: 129 MMHG | HEIGHT: 68 IN | BODY MASS INDEX: 38.95 KG/M2

## 2025-01-07 DIAGNOSIS — E04.9 ENLARGED THYROID: ICD-10-CM

## 2025-01-07 DIAGNOSIS — U09.9 POST COVID-19 CONDITION, UNSPECIFIED: ICD-10-CM

## 2025-01-07 DIAGNOSIS — R06.2 WHEEZING: ICD-10-CM

## 2025-01-07 DIAGNOSIS — R05.2 SUBACUTE COUGH: ICD-10-CM

## 2025-01-07 DIAGNOSIS — R05.2 SUBACUTE COUGH: Primary | ICD-10-CM

## 2025-01-07 LAB
BASOPHILS # BLD AUTO: 0 10E3/UL (ref 0–0.2)
BASOPHILS NFR BLD AUTO: 0 %
EOSINOPHIL # BLD AUTO: 0.1 10E3/UL (ref 0–0.7)
EOSINOPHIL NFR BLD AUTO: 1 %
ERYTHROCYTE [DISTWIDTH] IN BLOOD BY AUTOMATED COUNT: 13.5 % (ref 10–15)
HCT VFR BLD AUTO: 38.1 % (ref 35–47)
HGB BLD-MCNC: 12.9 G/DL (ref 11.7–15.7)
IMM GRANULOCYTES # BLD: 0 10E3/UL
IMM GRANULOCYTES NFR BLD: 0 %
LYMPHOCYTES # BLD AUTO: 1.9 10E3/UL (ref 0.8–5.3)
LYMPHOCYTES NFR BLD AUTO: 25 %
MCH RBC QN AUTO: 28.9 PG (ref 26.5–33)
MCHC RBC AUTO-ENTMCNC: 33.9 G/DL (ref 31.5–36.5)
MCV RBC AUTO: 85 FL (ref 78–100)
MONOCYTES # BLD AUTO: 0.8 10E3/UL (ref 0–1.3)
MONOCYTES NFR BLD AUTO: 10 %
NEUTROPHILS # BLD AUTO: 4.8 10E3/UL (ref 1.6–8.3)
NEUTROPHILS NFR BLD AUTO: 63 %
PLATELET # BLD AUTO: 270 10E3/UL (ref 150–450)
RBC # BLD AUTO: 4.47 10E6/UL (ref 3.8–5.2)
TSH SERPL DL<=0.005 MIU/L-ACNC: 1.29 UIU/ML (ref 0.3–4.2)
WBC # BLD AUTO: 7.6 10E3/UL (ref 4–11)

## 2025-01-07 PROCEDURE — 71046 X-RAY EXAM CHEST 2 VIEWS: CPT | Mod: TC | Performed by: RADIOLOGY

## 2025-01-07 PROCEDURE — 84443 ASSAY THYROID STIM HORMONE: CPT | Performed by: PHYSICIAN ASSISTANT

## 2025-01-07 PROCEDURE — 85025 COMPLETE CBC W/AUTO DIFF WBC: CPT | Performed by: PHYSICIAN ASSISTANT

## 2025-01-07 PROCEDURE — 36415 COLL VENOUS BLD VENIPUNCTURE: CPT | Performed by: PHYSICIAN ASSISTANT

## 2025-01-07 RX ORDER — PREDNISONE 20 MG/1
40 TABLET ORAL DAILY
Qty: 10 TABLET | Refills: 0 | Status: SHIPPED | OUTPATIENT
Start: 2025-01-07 | End: 2025-01-12

## 2025-01-07 RX ORDER — ALBUTEROL SULFATE 90 UG/1
2 INHALANT RESPIRATORY (INHALATION) EVERY 6 HOURS PRN
Qty: 18 G | Refills: 0 | Status: SHIPPED | OUTPATIENT
Start: 2025-01-07

## 2025-01-07 ASSESSMENT — PAIN SCALES - GENERAL: PAINLEVEL_OUTOF10: NO PAIN (0)

## 2025-01-07 NOTE — PROGRESS NOTES
Assessment & Plan     Subacute cough  - XR Chest 2 Views; Future  - CBC with platelets and differential  - predniSONE (DELTASONE) 20 MG tablet; Take 2 tablets (40 mg) by mouth daily for 5 days.  - albuterol (PROAIR HFA/PROVENTIL HFA/VENTOLIN HFA) 108 (90 Base) MCG/ACT inhaler; Inhale 2 puffs into the lungs every 6 hours as needed for shortness of breath, wheezing or cough.    Wheezing  - XR Chest 2 Views; Future  - CBC with platelets and differential  - predniSONE (DELTASONE) 20 MG tablet; Take 2 tablets (40 mg) by mouth daily for 5 days.  - albuterol (PROAIR HFA/PROVENTIL HFA/VENTOLIN HFA) 108 (90 Base) MCG/ACT inhaler; Inhale 2 puffs into the lungs every 6 hours as needed for shortness of breath, wheezing or cough.    Post covid-19 condition, unspecified  - XR Chest 2 Views; Future  - CBC with platelets and differential  - predniSONE (DELTASONE) 20 MG tablet; Take 2 tablets (40 mg) by mouth daily for 5 days.  - albuterol (PROAIR HFA/PROVENTIL HFA/VENTOLIN HFA) 108 (90 Base) MCG/ACT inhaler; Inhale 2 puffs into the lungs every 6 hours as needed for shortness of breath, wheezing or cough.    Enlarged thyroid  - US Thyroid; Future  - TSH with free T4 reflex    Will trial short burst steroids to see if this helps with opening airways better. Suspect post viral reactive airway. No sign of pneumonia on exam, in CBC or on CXR. Thyroid feels a bit enlarged so we will get a thyroid US to verify size and rule out nodules.     Follow up to be determined by testing outcome and patient response to treatment.     Valerie Last is a 44 year old, presenting for the following health issues:  Follow Up (Cough from covid, 3 weeks ago)        1/7/2025     9:34 AM   Additional Questions   Roomed by maureen   Accompanied by self         1/7/2025     9:34 AM   Patient Reported Additional Medications   Patient reports taking the following new medications see chart     Shala is a pleasant 44 year old female who presents to  "clinic for evaluation of on-going cough, chest tightness, wheezing since she was diagnosed with covid about 3 weeks ago. She has no fevers or chills this week but is very fatigued. She denies chest pain, painful respiration and SOB. She says she had asthma as a kid but has not needed an inhaler since childhood.     History of Present Illness       Reason for visit:  Not feeling better and raspy cough  Symptom onset:  1-2 weeks ago  Symptoms include:  Aches, Nausea, cough, wheezy,  Symptom intensity:  Moderate  Symptom progression:  Staying the same  Had these symptoms before:  No  What makes it worse:  Movement She is missing 1 dose(s) of medications per week.  She is not taking prescribed medications regularly due to remembering to take.         Review of Systems  Constitutional, HEENT, cardiovascular, pulmonary, gi and gu systems are negative, except as otherwise noted.      Objective    /84   Pulse 112   Temp 98.4  F (36.9  C) (Tympanic)   Resp 20   Ht 1.715 m (5' 7.5\")   Wt 116.6 kg (257 lb)   LMP  (LMP Unknown)   SpO2 100%   BMI 39.66 kg/m    Body mass index is 39.66 kg/m .  Physical Exam  Vitals reviewed.   Constitutional:       Appearance: Normal appearance. She is ill-appearing. She is not toxic-appearing.   Neck:      Thyroid: Thyromegaly present.   Cardiovascular:      Rate and Rhythm: Regular rhythm. Tachycardia present.      Heart sounds: No murmur heard.  Pulmonary:      Breath sounds: Wheezing present.      Comments: Left side lungs diffuse  Lymphadenopathy:      Cervical: Cervical adenopathy present.      Right cervical: Posterior cervical adenopathy present.   Neurological:      Mental Status: She is alert and oriented to person, place, and time.   Psychiatric:         Mood and Affect: Mood normal.         Speech: Speech normal.         Behavior: Behavior normal.         Thought Content: Thought content normal.          Results for orders placed or performed in visit on 01/07/25   XR " Chest 2 Views     Status: None    Narrative    EXAM: XR CHEST 2 VIEWS  LOCATION: [facility] [place]  DATE: 1/7/2025    INDICATION: Post covid. 3 weeks. Wheezing. Cough.  COMPARISON: None.      Impression    IMPRESSION: Negative chest.   Results for orders placed or performed in visit on 01/07/25   TSH with free T4 reflex     Status: Normal   Result Value Ref Range    TSH 1.29 0.30 - 4.20 uIU/mL   CBC with platelets and differential     Status: None   Result Value Ref Range    WBC Count 7.6 4.0 - 11.0 10e3/uL    RBC Count 4.47 3.80 - 5.20 10e6/uL    Hemoglobin 12.9 11.7 - 15.7 g/dL    Hematocrit 38.1 35.0 - 47.0 %    MCV 85 78 - 100 fL    MCH 28.9 26.5 - 33.0 pg    MCHC 33.9 31.5 - 36.5 g/dL    RDW 13.5 10.0 - 15.0 %    Platelet Count 270 150 - 450 10e3/uL    % Neutrophils 63 %    % Lymphocytes 25 %    % Monocytes 10 %    % Eosinophils 1 %    % Basophils 0 %    % Immature Granulocytes 0 %    Absolute Neutrophils 4.8 1.6 - 8.3 10e3/uL    Absolute Lymphocytes 1.9 0.8 - 5.3 10e3/uL    Absolute Monocytes 0.8 0.0 - 1.3 10e3/uL    Absolute Eosinophils 0.1 0.0 - 0.7 10e3/uL    Absolute Basophils 0.0 0.0 - 0.2 10e3/uL    Absolute Immature Granulocytes 0.0 <=0.4 10e3/uL   CBC with platelets and differential     Status: None    Narrative    The following orders were created for panel order CBC with platelets and differential.  Procedure                               Abnormality         Status                     ---------                               -----------         ------                     CBC with platelets and d...[[id number]]                      Final result                 Please view results for these tests on the individual orders.       Signed Electronically by: LINDEN POP PA-C

## 2025-02-03 ENCOUNTER — TELEPHONE (OUTPATIENT)
Dept: ENDOCRINOLOGY | Facility: CLINIC | Age: 45
End: 2025-02-03
Payer: COMMERCIAL

## 2025-02-03 VITALS — BODY MASS INDEX: 38.65 KG/M2 | HEIGHT: 68 IN | WEIGHT: 255 LBS

## 2025-02-03 NOTE — TELEPHONE ENCOUNTER
I am attempting to renew the PA for Wegovy and am in need of a current weight. Thanks!    Radha Reid, [facility]  Endo Clinic Liaison  [facility]  [email or web address]   [email or web address]  Phone: [phone]  Fax: [phone]

## 2025-02-03 NOTE — TELEPHONE ENCOUNTER
Prior Authorization Approval    Medication: WEGOVY SC  Authorization Effective Date: 2/3/2025  Authorization Expiration Date: 2/3/2026  Approved Dose/Quantity: one box per 28 days  Reference #: [id number]   Insurance Company: [employer] - Phone [phone] Fax [phone]  Expected CoPay: $    CoPay Card Available:      Financial Assistance Needed:   Which Pharmacy is filling the prescription: [facility] [address]  Pharmacy Notified: no  Patient Notified: yes mychart msg

## 2025-02-03 NOTE — TELEPHONE ENCOUNTER
"Estimated body mass index is 38.77 kg/m  as calculated from the following:    Height as of this encounter: 1.727 m (5' 8\").    Weight as of this encounter: 115.7 kg (255 lb).    "

## 2025-03-24 ENCOUNTER — DOCUMENTATION ONLY (OUTPATIENT)
Dept: SLEEP MEDICINE | Facility: CLINIC | Age: 45
End: 2025-03-24
Payer: COMMERCIAL

## 2025-03-24 DIAGNOSIS — G47.33 OSA (OBSTRUCTIVE SLEEP APNEA): Primary | Chronic | ICD-10-CM

## 2025-04-01 ENCOUNTER — OFFICE VISIT (OUTPATIENT)
Dept: FAMILY MEDICINE | Facility: CLINIC | Age: 45
End: 2025-04-01
Payer: COMMERCIAL

## 2025-04-01 VITALS
RESPIRATION RATE: 20 BRPM | SYSTOLIC BLOOD PRESSURE: 119 MMHG | OXYGEN SATURATION: 100 % | DIASTOLIC BLOOD PRESSURE: 79 MMHG | BODY MASS INDEX: 36.83 KG/M2 | TEMPERATURE: 97.9 F | HEIGHT: 68 IN | WEIGHT: 243 LBS | HEART RATE: 56 BPM

## 2025-04-01 DIAGNOSIS — E87.6 HYPOKALEMIA: ICD-10-CM

## 2025-04-01 DIAGNOSIS — K85.10 GALLSTONE PANCREATITIS: Primary | ICD-10-CM

## 2025-04-01 LAB
ALBUMIN SERPL BCG-MCNC: 3.4 G/DL (ref 3.5–5.2)
ALP SERPL-CCNC: 140 U/L (ref 40–150)
ALT SERPL W P-5'-P-CCNC: 63 U/L (ref 0–50)
ANION GAP SERPL CALCULATED.3IONS-SCNC: 12 MMOL/L (ref 7–15)
AST SERPL W P-5'-P-CCNC: 28 U/L (ref 0–45)
BASOPHILS # BLD AUTO: 0 10E3/UL (ref 0–0.2)
BASOPHILS NFR BLD AUTO: 0 %
BILIRUB SERPL-MCNC: 0.6 MG/DL
BUN SERPL-MCNC: 9.3 MG/DL (ref 6–20)
CALCIUM SERPL-MCNC: 9 MG/DL (ref 8.8–10.4)
CHLORIDE SERPL-SCNC: 103 MMOL/L (ref 98–107)
CREAT SERPL-MCNC: 0.75 MG/DL (ref 0.51–0.95)
EGFRCR SERPLBLD CKD-EPI 2021: >90 ML/MIN/1.73M2
EOSINOPHIL # BLD AUTO: 0.4 10E3/UL (ref 0–0.7)
EOSINOPHIL NFR BLD AUTO: 4 %
ERYTHROCYTE [DISTWIDTH] IN BLOOD BY AUTOMATED COUNT: 12.9 % (ref 10–15)
GLUCOSE SERPL-MCNC: 148 MG/DL (ref 70–99)
HCO3 SERPL-SCNC: 26 MMOL/L (ref 22–29)
HCT VFR BLD AUTO: 32.7 % (ref 35–47)
HGB BLD-MCNC: 10.9 G/DL (ref 11.7–15.7)
IMM GRANULOCYTES # BLD: 0 10E3/UL
IMM GRANULOCYTES NFR BLD: 0 %
LYMPHOCYTES # BLD AUTO: 1.4 10E3/UL (ref 0.8–5.3)
LYMPHOCYTES NFR BLD AUTO: 14 %
MCH RBC QN AUTO: 29.2 PG (ref 26.5–33)
MCHC RBC AUTO-ENTMCNC: 33.3 G/DL (ref 31.5–36.5)
MCV RBC AUTO: 88 FL (ref 78–100)
MONOCYTES # BLD AUTO: 0.6 10E3/UL (ref 0–1.3)
MONOCYTES NFR BLD AUTO: 6 %
NEUTROPHILS # BLD AUTO: 7.4 10E3/UL (ref 1.6–8.3)
NEUTROPHILS NFR BLD AUTO: 75 %
PLATELET # BLD AUTO: 318 10E3/UL (ref 150–450)
POTASSIUM SERPL-SCNC: 3 MMOL/L (ref 3.4–5.3)
PROT SERPL-MCNC: 6.2 G/DL (ref 6.4–8.3)
RBC # BLD AUTO: 3.73 10E6/UL (ref 3.8–5.2)
SODIUM SERPL-SCNC: 141 MMOL/L (ref 135–145)
WBC # BLD AUTO: 9.8 10E3/UL (ref 4–11)

## 2025-04-01 PROCEDURE — 3074F SYST BP LT 130 MM HG: CPT | Performed by: NURSE PRACTITIONER

## 2025-04-01 PROCEDURE — 99213 OFFICE O/P EST LOW 20 MIN: CPT | Performed by: NURSE PRACTITIONER

## 2025-04-01 PROCEDURE — 80053 COMPREHEN METABOLIC PANEL: CPT | Performed by: NURSE PRACTITIONER

## 2025-04-01 PROCEDURE — 1126F AMNT PAIN NOTED NONE PRSNT: CPT | Performed by: NURSE PRACTITIONER

## 2025-04-01 PROCEDURE — 85025 COMPLETE CBC W/AUTO DIFF WBC: CPT | Performed by: NURSE PRACTITIONER

## 2025-04-01 PROCEDURE — 1111F DSCHRG MED/CURRENT MED MERGE: CPT | Performed by: NURSE PRACTITIONER

## 2025-04-01 PROCEDURE — 3078F DIAST BP <80 MM HG: CPT | Performed by: NURSE PRACTITIONER

## 2025-04-01 PROCEDURE — 36415 COLL VENOUS BLD VENIPUNCTURE: CPT | Performed by: NURSE PRACTITIONER

## 2025-04-01 RX ORDER — MULTIVITAMIN
1 TABLET ORAL DAILY
COMMUNITY

## 2025-04-01 ASSESSMENT — PAIN SCALES - GENERAL: PAINLEVEL_OUTOF10: NO PAIN (0)

## 2025-04-01 NOTE — PROGRESS NOTES
"  Assessment & Plan     Gallstone pancreatitis  Doing well, no symptoms.  Incisions look okay.  She will be seeing surgeon later this week.  It was recommended she have follow-up CBC and CMP- pending.     - CBC with platelets and differential  - Comprehensive metabolic panel (BMP + Alb, Alk Phos, ALT, AST, Total. Bili, TP)        MED REC REQUIRED  Post Medication Reconciliation Status:  Discharge medications reconciled, continue medications without change  BMI  Estimated body mass index is 36.95 kg/m  as calculated from the following:    Height as of this encounter: 1.727 m (5' 8\").    Weight as of this encounter: 110.2 kg (243 lb).         Valerie Last is a 45 year old, presenting for the following health issues:  Hospital F/U        4/1/2025     9:39 AM   Additional Questions   Roomed by maureen   Accompanied by self         4/1/2025     9:39 AM   Patient Reported Additional Medications   Patient reports taking the following new medications see chart     HPI          Hospital Follow-up Visit:    [facility]Nursing [facility]  Date of Admission: 3/26/25  Date of Discharge: 3/29/25  Reason(s) for Admission: abdominal pain, gallbladder removal  Was the patient in the ICU or did the patient experience delirium during hospitalization?  No  Do you have any other stressors you would like to discuss with your provider? No    Problems taking medications regularly:  None  Medication changes since discharge: None  Problems adhering to non-medication therapy:  None    Summary of hospitalization:  CareEverywhere information obtained and reviewed  Diagnostic Tests/Treatments reviewed.  Follow up needed: labs  Other Healthcare Providers Involved in Patient s Care:         None  Update since discharge: improved.   Plan of care communicated with patient        Review of Systems  Constitutional, HEENT, cardiovascular, pulmonary, gi and gu systems are negative, except as otherwise noted.      Objective  " "  /79   Pulse 56   Temp 97.9  F (36.6  C) (Tympanic)   Resp 20   Ht 1.727 m (5' 8\")   Wt 110.2 kg (243 lb)   LMP  (LMP Unknown)   SpO2 100%   BMI 36.95 kg/m    Body mass index is 36.95 kg/m .  Physical Exam   GENERAL: alert and no distress  EYES: Eyes grossly normal to inspection, PERRL and conjunctivae and sclerae normal  RESP: lungs clear to auscultation - no rales, rhonchi or wheezes  CV: regular rate and rhythm, normal S1 S2, no S3 or S4, no murmur, click or rub, no peripheral edema  ABDOMEN: soft, nontender, no hepatosplenomegaly, no masses and bowel sounds normal.  Incisions intact with steri strips, mild ecchymosis surrounding umbilical incision.     MS: no gross musculoskeletal defects noted, no edema  SKIN: no suspicious lesions or rashes  NEURO: Normal strength and tone, mentation intact and speech normal    Labs reviewed in Care Everywhere          "

## 2025-04-02 RX ORDER — POTASSIUM CHLORIDE 1500 MG/1
20 TABLET, EXTENDED RELEASE ORAL 2 TIMES DAILY
Qty: 14 TABLET | Refills: 0 | Status: SHIPPED | OUTPATIENT
Start: 2025-04-02 | End: 2025-04-09

## 2025-04-10 ENCOUNTER — LAB (OUTPATIENT)
Dept: LAB | Facility: CLINIC | Age: 45
End: 2025-04-10
Payer: COMMERCIAL

## 2025-04-10 DIAGNOSIS — E87.6 HYPOKALEMIA: ICD-10-CM

## 2025-04-11 LAB — POTASSIUM SERPL-SCNC: 4.1 MMOL/L (ref 3.4–5.3)

## 2025-04-18 ENCOUNTER — OFFICE VISIT (OUTPATIENT)
Dept: FAMILY MEDICINE | Facility: CLINIC | Age: 45
End: 2025-04-18
Payer: COMMERCIAL

## 2025-04-18 VITALS
WEIGHT: 237 LBS | OXYGEN SATURATION: 95 % | SYSTOLIC BLOOD PRESSURE: 116 MMHG | DIASTOLIC BLOOD PRESSURE: 71 MMHG | HEART RATE: 104 BPM | RESPIRATION RATE: 20 BRPM | HEIGHT: 68 IN | BODY MASS INDEX: 35.92 KG/M2 | TEMPERATURE: 99.2 F

## 2025-04-18 DIAGNOSIS — R55 PRE-SYNCOPE: Primary | ICD-10-CM

## 2025-04-18 DIAGNOSIS — D64.9 POSTOPERATIVE ANEMIA: ICD-10-CM

## 2025-04-18 DIAGNOSIS — R00.0 TACHYCARDIA: ICD-10-CM

## 2025-04-18 PROBLEM — I10 HTN (HYPERTENSION): Status: ACTIVE | Noted: 2022-09-20

## 2025-04-18 PROBLEM — K80.20 CHOLELITHIASIS: Status: ACTIVE | Noted: 2025-03-29

## 2025-04-18 PROBLEM — K85.10 GALLSTONE PANCREATITIS: Status: ACTIVE | Noted: 2025-03-26

## 2025-04-18 LAB
BASOPHILS # BLD AUTO: 0.1 10E3/UL (ref 0–0.2)
BASOPHILS NFR BLD AUTO: 1 %
EOSINOPHIL # BLD AUTO: 0.3 10E3/UL (ref 0–0.7)
EOSINOPHIL NFR BLD AUTO: 4 %
ERYTHROCYTE [DISTWIDTH] IN BLOOD BY AUTOMATED COUNT: 13 % (ref 10–15)
HCT VFR BLD AUTO: 36 % (ref 35–47)
HGB BLD-MCNC: 11.8 G/DL (ref 11.7–15.7)
IMM GRANULOCYTES # BLD: 0 10E3/UL
IMM GRANULOCYTES NFR BLD: 0 %
LYMPHOCYTES # BLD AUTO: 2.1 10E3/UL (ref 0.8–5.3)
LYMPHOCYTES NFR BLD AUTO: 26 %
MCH RBC QN AUTO: 29.1 PG (ref 26.5–33)
MCHC RBC AUTO-ENTMCNC: 32.8 G/DL (ref 31.5–36.5)
MCV RBC AUTO: 89 FL (ref 78–100)
MONOCYTES # BLD AUTO: 0.5 10E3/UL (ref 0–1.3)
MONOCYTES NFR BLD AUTO: 6 %
NEUTROPHILS # BLD AUTO: 5.1 10E3/UL (ref 1.6–8.3)
NEUTROPHILS NFR BLD AUTO: 63 %
PLATELET # BLD AUTO: 297 10E3/UL (ref 150–450)
RBC # BLD AUTO: 4.06 10E6/UL (ref 3.8–5.2)
WBC # BLD AUTO: 8.1 10E3/UL (ref 4–11)

## 2025-04-18 PROCEDURE — 3074F SYST BP LT 130 MM HG: CPT | Performed by: PHYSICIAN ASSISTANT

## 2025-04-18 PROCEDURE — 99214 OFFICE O/P EST MOD 30 MIN: CPT | Performed by: PHYSICIAN ASSISTANT

## 2025-04-18 PROCEDURE — 85025 COMPLETE CBC W/AUTO DIFF WBC: CPT | Performed by: PHYSICIAN ASSISTANT

## 2025-04-18 PROCEDURE — 80053 COMPREHEN METABOLIC PANEL: CPT | Performed by: PHYSICIAN ASSISTANT

## 2025-04-18 PROCEDURE — 93000 ELECTROCARDIOGRAM COMPLETE: CPT | Performed by: PHYSICIAN ASSISTANT

## 2025-04-18 PROCEDURE — 1126F AMNT PAIN NOTED NONE PRSNT: CPT | Performed by: PHYSICIAN ASSISTANT

## 2025-04-18 PROCEDURE — 3078F DIAST BP <80 MM HG: CPT | Performed by: PHYSICIAN ASSISTANT

## 2025-04-18 PROCEDURE — 82728 ASSAY OF FERRITIN: CPT | Performed by: PHYSICIAN ASSISTANT

## 2025-04-18 PROCEDURE — 36415 COLL VENOUS BLD VENIPUNCTURE: CPT | Performed by: PHYSICIAN ASSISTANT

## 2025-04-18 ASSESSMENT — PAIN SCALES - GENERAL: PAINLEVEL_OUTOF10: NO PAIN (0)

## 2025-04-18 NOTE — PROGRESS NOTES
"50 lbs since June 2024  6lbs in 2 weeks  [address] [place] [address]  10.9 hgb 4/1      Assessment & Plan     Pre-syncope    - EKG 12-lead complete w/read NSR, rate 91 (at rest), normal axis, no heart block, no LVH, no abnormal beats. Normal EKG.   - Ferritin; Future  - CBC with platelets and differential  - Comprehensive metabolic panel (BMP + Alb, Alk Phos, ALT, AST, Total. Bili, TP)  - Ferritin    Tachycardia  Could be postural tachycardia. Consider referral for tilt table testing. Consider Cardiology referral. Consider Zio. HR fast on arrival, went down after sitting for a bit. Unfortunately, Orthostatic vitals not available although they were done.   - EKG 12-lead complete w/read - Clinics  - Ferritin; Future  - CBC with platelets and differential  - Comprehensive metabolic panel (BMP + Alb, Alk Phos, ALT, AST, Total. Bili, TP)  - Ferritin    Postoperative anemia  Ferritin is elevated. Hgb borderline low. Not severe enough to cause presyncope.   - Ferritin      BMI 36  F.U with PCP to address    Will notify patient of results and recommend Zio first with follow up with her PCP.       BMI  Estimated body mass index is 36.04 kg/m  as calculated from the following:    Height as of this encounter: 1.727 m (5' 8\").    Weight as of this encounter: 107.5 kg (237 lb).       Subjective   Shala is a 45 year old, presenting for the following health issues:  Dizziness (Getting up from sitting and crouching, has gotten worse since surgery)        4/18/2025     3:40 PM   Additional Questions   Roomed by maureen   Accompanied by self         4/18/2025     3:40 PM   Patient Reported Additional Medications   Patient reports taking the following new medications see chart     Shala is a pleasant 45 year old female who presents to clinic for postural dizziness. She states that she has always had this and knows to get out of a chair or up from bed slowly, but since she has a recent hospitalization in March for gallstone pancreatitis, she " "has had worsening symptoms. She feels like when she stands up, she is going to pass out. She is eating well, having normal BM's, urinating normally. She is not excessively tired or in pain. She drinks at least 2 large bottles of water daily. She wears hearing aids. She has no ear symptoms. No cold symptoms. No heart palpitations. No SOB, cough or chest pain. Although her answers denote room spinning, she notes today that she does not, in fact, have room spinning. She just feels like she is going to faint.     History of Present Illness       Reason for visit:  Dizziness/off balance/ room spinning  Symptom onset:  More than a month  Symptoms include:  Dizziness/off balance/ room spinning  Symptom intensity:  Moderate  Symptom progression:  Worsening  Had these symptoms before:  Yes  Has tried/received treatment for these symptoms:  No  What makes it worse:  Standing up  What makes it better:  Sitting down She is missing 1 dose(s) of medications per week.  She is not taking prescribed medications regularly due to remembering to take.            Review of Systems  Constitutional, HEENT, cardiovascular, pulmonary, gi and gu systems are negative, except as otherwise noted.      Objective    /71   Pulse 104   Temp 99.2  F (37.3  C) (Tympanic)   Resp 20   Ht 1.727 m (5' 8\")   Wt 107.5 kg (237 lb)   LMP  (LMP Unknown)   SpO2 95%   BMI 36.04 kg/m    Body mass index is 36.04 kg/m .      Supine: 105/74 P96 O2 sat 95%  Sitting:                      O2 sat 99%  Standing:                  O2 sat 100%  Unfortunately, Orthostatic vitals did not get documented. It appears to be an Epic issue as I can see the supine set but I cannot see the others.     Physical Exam  Vitals and nursing note reviewed.   Constitutional:       Appearance: Normal appearance.   Eyes:      Pupils: Pupils are equal, round, and reactive to light.   Cardiovascular:      Rate and Rhythm: Regular rhythm. Tachycardia present.      Heart sounds: No " murmur heard.  Pulmonary:      Effort: Pulmonary effort is normal.      Breath sounds: Normal breath sounds.   Skin:     General: Skin is warm and dry.   Neurological:      Mental Status: She is alert and oriented to person, place, and time.      Cranial Nerves: Cranial nerves 2-12 are intact.      Gait: Gait normal.      Comments: No nystagmus          Results for orders placed or performed in visit on 04/18/25   Comprehensive metabolic panel (BMP + Alb, Alk Phos, ALT, AST, Total. Bili, TP)     Status: Normal   Result Value Ref Range    Sodium 140 135 - 145 mmol/L    Potassium 3.7 3.4 - 5.3 mmol/L    Carbon Dioxide (CO2) 24 22 - 29 mmol/L    Anion Gap 11 7 - 15 mmol/L    Urea Nitrogen 14.6 6.0 - 20.0 mg/dL    Creatinine 0.89 0.51 - 0.95 mg/dL    GFR Estimate 82 >60 mL/min/1.73m2    Calcium 9.4 8.8 - 10.4 mg/dL    Chloride 105 98 - 107 mmol/L    Glucose 90 70 - 99 mg/dL    Alkaline Phosphatase 82 40 - 150 U/L    AST 25 0 - 45 U/L    ALT 20 0 - 50 U/L    Protein Total 7.2 6.4 - 8.3 g/dL    Albumin 4.4 3.5 - 5.2 g/dL    Bilirubin Total 0.5 <=1.2 mg/dL   Ferritin     Status: Abnormal   Result Value Ref Range    Ferritin 199 (H) 6 - 175 ng/mL   CBC with platelets and differential     Status: None   Result Value Ref Range    WBC Count 8.1 4.0 - 11.0 10e3/uL    RBC Count 4.06 3.80 - 5.20 10e6/uL    Hemoglobin 11.8 11.7 - 15.7 g/dL    Hematocrit 36.0 35.0 - 47.0 %    MCV 89 78 - 100 fL    MCH 29.1 26.5 - 33.0 pg    MCHC 32.8 31.5 - 36.5 g/dL    RDW 13.0 10.0 - 15.0 %    Platelet Count 297 150 - 450 10e3/uL    % Neutrophils 63 %    % Lymphocytes 26 %    % Monocytes 6 %    % Eosinophils 4 %    % Basophils 1 %    % Immature Granulocytes 0 %    Absolute Neutrophils 5.1 1.6 - 8.3 10e3/uL    Absolute Lymphocytes 2.1 0.8 - 5.3 10e3/uL    Absolute Monocytes 0.5 0.0 - 1.3 10e3/uL    Absolute Eosinophils 0.3 0.0 - 0.7 10e3/uL    Absolute Basophils 0.1 0.0 - 0.2 10e3/uL    Absolute Immature Granulocytes 0.0 <=0.4 10e3/uL   CBC with  platelets and differential     Status: None    Narrative    The following orders were created for panel order CBC with platelets and differential.  Procedure                               Abnormality         Status                     ---------                               -----------         ------                     CBC with platelets and ...[[id number]]                      Final result                 Please view results for these tests on the individual orders.           Signed Electronically by: LINDEN POP PA-C

## 2025-04-19 LAB
ALBUMIN SERPL BCG-MCNC: 4.4 G/DL (ref 3.5–5.2)
ALP SERPL-CCNC: 82 U/L (ref 40–150)
ALT SERPL W P-5'-P-CCNC: 20 U/L (ref 0–50)
ANION GAP SERPL CALCULATED.3IONS-SCNC: 11 MMOL/L (ref 7–15)
AST SERPL W P-5'-P-CCNC: 25 U/L (ref 0–45)
BILIRUB SERPL-MCNC: 0.5 MG/DL
BUN SERPL-MCNC: 14.6 MG/DL (ref 6–20)
CALCIUM SERPL-MCNC: 9.4 MG/DL (ref 8.8–10.4)
CHLORIDE SERPL-SCNC: 105 MMOL/L (ref 98–107)
CREAT SERPL-MCNC: 0.89 MG/DL (ref 0.51–0.95)
EGFRCR SERPLBLD CKD-EPI 2021: 82 ML/MIN/1.73M2
FERRITIN SERPL-MCNC: 199 NG/ML (ref 6–175)
GLUCOSE SERPL-MCNC: 90 MG/DL (ref 70–99)
HCO3 SERPL-SCNC: 24 MMOL/L (ref 22–29)
POTASSIUM SERPL-SCNC: 3.7 MMOL/L (ref 3.4–5.3)
PROT SERPL-MCNC: 7.2 G/DL (ref 6.4–8.3)
SODIUM SERPL-SCNC: 140 MMOL/L (ref 135–145)

## 2025-04-20 PROBLEM — E66.01 MORBID OBESITY (H): Chronic | Status: RESOLVED | Noted: 2021-03-29 | Resolved: 2025-04-20

## 2025-04-21 ENCOUNTER — OFFICE VISIT (OUTPATIENT)
Dept: FAMILY MEDICINE | Facility: CLINIC | Age: 45
End: 2025-04-21
Payer: COMMERCIAL

## 2025-04-21 VITALS
HEIGHT: 68 IN | OXYGEN SATURATION: 99 % | HEART RATE: 110 BPM | TEMPERATURE: 98.3 F | RESPIRATION RATE: 16 BRPM | WEIGHT: 242 LBS | BODY MASS INDEX: 36.68 KG/M2 | SYSTOLIC BLOOD PRESSURE: 109 MMHG | DIASTOLIC BLOOD PRESSURE: 71 MMHG

## 2025-04-21 DIAGNOSIS — R42 LIGHTHEADEDNESS: Primary | ICD-10-CM

## 2025-04-21 PROCEDURE — 99213 OFFICE O/P EST LOW 20 MIN: CPT | Performed by: PHYSICIAN ASSISTANT

## 2025-04-21 PROCEDURE — 3078F DIAST BP <80 MM HG: CPT | Performed by: PHYSICIAN ASSISTANT

## 2025-04-21 PROCEDURE — 3074F SYST BP LT 130 MM HG: CPT | Performed by: PHYSICIAN ASSISTANT

## 2025-04-21 PROCEDURE — 1126F AMNT PAIN NOTED NONE PRSNT: CPT | Performed by: PHYSICIAN ASSISTANT

## 2025-04-21 PROCEDURE — 93242 EXT ECG>48HR<7D RECORDING: CPT | Performed by: PHYSICIAN ASSISTANT

## 2025-04-21 ASSESSMENT — PAIN SCALES - GENERAL: PAINLEVEL_OUTOF10: NO PAIN (0)

## 2025-04-21 NOTE — PROGRESS NOTES
"  Assessment & Plan     Lightheadedness  Zio monitor was placed.   Cardiology referral also placed.   - Adult Cardiology Eval [facility] Referral; Future  - ZIO PATCH 3-7 DAYS (additional cost to patient)  - ZIO PATCH 3-7 DAYS APPLICATION          BMI  Estimated body mass index is 36.8 kg/m  as calculated from the following:    Height as of this encounter: 1.727 m (5' 8\").    Weight as of this encounter: 109.8 kg (242 lb).             Subjective   Shala is a 45 year old, presenting for the following health issues:  Dizziness (Sx are staying the same since last visit )        4/21/2025     4:38 PM   Additional Questions   Roomed by Krystina MINA   Accompanied by Self     History of Present Illness       Reason for visit:  Dizziness/off balance/ room spinning  Symptom onset:  More than a month  Symptoms include:  Dizziness/off balance/ room spinning  Symptom intensity:  Moderate  Symptom progression:  Worsening  Had these symptoms before:  Yes  Has tried/received treatment for these symptoms:  No  What makes it worse:  Standing up  What makes it better:  Sitting down She is missing 1 dose(s) of medications per week.  She is not taking prescribed medications regularly due to remembering to take.        Shala was seen 4 days ago. She has continued lightheadedness.   Lab tests returned and normal.   EKG normal.   She does not have vertigo contrary to the above \"room spinning\" answers.   She feels lightheaded when she is up from a seated position. She was instructed to come in for a Zio monitor and was scheduled for an appointment in clinic today.         Review of Systems  Constitutional, HEENT, cardiovascular, pulmonary, GI, , musculoskeletal, neuro, skin, endocrine and psych systems are negative, except as otherwise noted.      Objective    /71   Pulse 110   Temp 98.3  F (36.8  C) (Oral)   Resp 16   Ht 1.727 m (5' 8\")   Wt 109.8 kg (242 lb)   LMP  (LMP Unknown)   SpO2 99%   BMI 36.80 kg/m    Body mass " index is 36.8 kg/m .  Physical Exam   GENERAL: alert and no distress  RESP: lungs clear to auscultation - no rales, rhonchi or wheezes  CV: regular rate and rhythm, normal S1 S2, no S3 or S4, no murmur, click or rub, no peripheral edema   MS: no gross musculoskeletal defects noted, no edema  SKIN: no suspicious lesions or rashes  NEURO: Normal strength and tone, sensory exam grossly normal, mentation intact, and cranial nerves 2-12 intact  PSYCH: mentation appears normal, affect normal/bright    Office Visit on 04/18/2025   Component Date Value Ref Range Status    Sodium 04/18/2025 140  135 - 145 mmol/L Final    Potassium 04/18/2025 3.7  3.4 - 5.3 mmol/L Final    Carbon Dioxide (CO2) 04/18/2025 24  22 - 29 mmol/L Final    Anion Gap 04/18/2025 11  7 - 15 mmol/L Final    Urea Nitrogen 04/18/2025 14.6  6.0 - 20.0 mg/dL Final    Creatinine 04/18/2025 0.89  0.51 - 0.95 mg/dL Final    GFR Estimate 04/18/2025 82  >60 mL/min/1.73m2 Final    eGFR calculated using 2021 CKD-EPI equation.    Calcium 04/18/2025 9.4  8.8 - 10.4 mg/dL Final    Chloride 04/18/2025 105  98 - 107 mmol/L Final    Glucose 04/18/2025 90  70 - 99 mg/dL Final    Alkaline Phosphatase 04/18/2025 82  40 - 150 U/L Final    AST 04/18/2025 25  0 - 45 U/L Final    ALT 04/18/2025 20  0 - 50 U/L Final    Protein Total 04/18/2025 7.2  6.4 - 8.3 g/dL Final    Albumin 04/18/2025 4.4  3.5 - 5.2 g/dL Final    Bilirubin Total 04/18/2025 0.5  <=1.2 mg/dL Final    Ferritin 04/18/2025 199 (H)  6 - 175 ng/mL Final    WBC Count 04/18/2025 8.1  4.0 - 11.0 10e3/uL Final    RBC Count 04/18/2025 4.06  3.80 - 5.20 10e6/uL Final    Hemoglobin 04/18/2025 11.8  11.7 - 15.7 g/dL Final    Hematocrit 04/18/2025 36.0  35.0 - 47.0 % Final    MCV 04/18/2025 89  78 - 100 fL Final    MCH 04/18/2025 29.1  26.5 - 33.0 pg Final    MCHC 04/18/2025 32.8  31.5 - 36.5 g/dL Final    RDW 04/18/2025 13.0  10.0 - 15.0 % Final    Platelet Count 04/18/2025 297  150 - 450 10e3/uL Final    % Neutrophils  04/18/2025 63  % Final    % Lymphocytes 04/18/2025 26  % Final    % Monocytes 04/18/2025 6  % Final    % Eosinophils 04/18/2025 4  % Final    % Basophils 04/18/2025 1  % Final    % Immature Granulocytes 04/18/2025 0  % Final    Absolute Neutrophils 04/18/2025 5.1  1.6 - 8.3 10e3/uL Final    Absolute Lymphocytes 04/18/2025 2.1  0.8 - 5.3 10e3/uL Final    Absolute Monocytes 04/18/2025 0.5  0.0 - 1.3 10e3/uL Final    Absolute Eosinophils 04/18/2025 0.3  0.0 - 0.7 10e3/uL Final    Absolute Basophils 04/18/2025 0.1  0.0 - 0.2 10e3/uL Final    Absolute Immature Granulocytes 04/18/2025 0.0  <=0.4 10e3/uL Final           Signed Electronically by: Kristen M. Kehr, PA-C

## 2025-04-21 NOTE — PATIENT INSTRUCTIONS
Heart monitor to be worn for 7 days.   It will take about a week to get results     Make a Cardiology appointment for 3-4 weeks. The results should be available for the Cardiology appointment.

## 2025-04-28 ENCOUNTER — VIRTUAL VISIT (OUTPATIENT)
Dept: ENDOCRINOLOGY | Facility: CLINIC | Age: 45
End: 2025-04-28
Payer: COMMERCIAL

## 2025-04-28 VITALS — WEIGHT: 237 LBS | BODY MASS INDEX: 35.92 KG/M2 | HEIGHT: 68 IN

## 2025-04-28 DIAGNOSIS — E66.01 MORBID OBESITY (H): ICD-10-CM

## 2025-04-28 DIAGNOSIS — I10 ESSENTIAL HYPERTENSION: ICD-10-CM

## 2025-04-28 PROCEDURE — 1126F AMNT PAIN NOTED NONE PRSNT: CPT | Mod: 95 | Performed by: INTERNAL MEDICINE

## 2025-04-28 PROCEDURE — 98005 SYNCH AUDIO-VIDEO EST LOW 20: CPT | Performed by: INTERNAL MEDICINE

## 2025-04-28 RX ORDER — SEMAGLUTIDE 1 MG/.5ML
1 INJECTION, SOLUTION SUBCUTANEOUS WEEKLY
Qty: 2 ML | Refills: 11 | Status: SHIPPED | OUTPATIENT
Start: 2025-04-28

## 2025-04-28 ASSESSMENT — PAIN SCALES - GENERAL: PAINLEVEL_OUTOF10: NO PAIN (0)

## 2025-04-28 NOTE — NURSING NOTE
Current patient location: Patient declined to provide     Is the patient currently in the state of [place]? YES    Visit mode: VIDEO    If the visit is dropped, the patient can be reconnected by:VIDEO VISIT: Text to cell phone:   Telephone Information:   Mobile [phone]       Will anyone else be joining the visit? NO  (If patient encounters technical issues they should call [phone])    Are changes needed to the allergy or medication list? No    Are refills needed on medications prescribed by this physician? NO    Rooming Documentation:  Questionnaire(s) completed    Reason for visit: RECHECK    Delfino RANGEL

## 2025-04-28 NOTE — PROGRESS NOTES
"    Return Medical Weight Management Note     Shala Poole  MRN:  [id number]  :  1980  DANNIELLE:  2024    Dear Kristen M. Kehr, PA-C,    I had the pleasure of seeing your patient Shala Poole.  She is a 44 year old female who I am continuing to see for treatment of obesity related to:      2022     8:35 PM   --   I have the following health issues associated with obesity High Blood Pressure    GERD (Reflux)   I have the following symptoms associated with obesity Knee Pain    Depression    Back Pain    Fatigue    Hip Pain     CURRENT WEIGHT:   237 lbs 0 oz    Wt Readings from Last 4 Encounters:   25 107.5 kg (237 lb)   25 109.8 kg (242 lb)   25 107.5 kg (237 lb)   25 110.2 kg (243 lb)     Height:  5' 8\"  Body Mass Index:  Body mass index is 36.04 kg/m .  Vitals:  B/P: Data Unavailable, P: Data Unavailable    Initial consult weight was 276 on 22.  Weight change since last seen on 2024 is down 29 pounds.   Total loss is 41 pounds.    INTERVAL HISTORY:  Was able to get Wegovy covered by insurance, now on . No side effects of note and helping with food thoughts..        2022     8:35 PM   Diet Recall Review with Patient   Do you typically eat breakfast? No   Do you typically eat lunch? Yes   If you do eat lunch, what types of food do you typically eat? Fast food, frozen pizza   Do you typically eat supper? Yes   If you do eat supper, what types of food do you typically eat? Meat and potatoes, pasta,   Do you typically eat snacks? Yes   If you do snack, what types of food do you typically eat? Chips and cheese, cookies, beef jerky, popcorn, cheese sticks,   Do you like vegetables?  Yes   Do you drink water? Yes   How many glasses of juice do you drink in a typical day? 0   How many of glasses of milk do you drink in a typical day? 0   If you do drink milk, what type? N/A   How many 8oz glasses of sugar containing drinks such as Abel-Aid/sweet tea do you " drink in a day? 0   How many cans/bottles of sugar pop/soda/tea/sports drinks do you drink in a day? 3   How many cans/bottles of diet pop/soda/tea or sports drink do you drink in a day? 2   How often do you have a drink of alcohol? 2-4 Times a Month   If you do drink, how many drinks might you have in a day? 3-4     MEDICATIONS:   Current Outpatient Medications   Medication Sig Dispense Refill    acetaminophen (TYLENOL) 325 MG tablet Take 325-650 mg by mouth      albuterol (PROAIR HFA/PROVENTIL HFA/VENTOLIN HFA) 108 (90 Base) MCG/ACT inhaler Inhale 2 puffs into the lungs every 6 hours as needed for shortness of breath, wheezing or cough. 18 g 0    amLODIPine (NORVASC) 2.5 MG tablet Take 1 tablet (2.5 mg) by mouth daily. 90 tablet 3    FLUoxetine (PROZAC) 20 MG capsule Take 1 capsule (20 mg) by mouth daily. Take with 40 for total of 60 90 capsule 3    FLUoxetine (PROZAC) 40 MG capsule Take 1 capsule (40 mg) by mouth daily. 90 capsule 3    Multiple Vitamin (ONE-A-DAY ESSENTIAL) TABS Take 1 tablet by mouth daily.      Semaglutide-Weight Management (WEGOVY) 1 MG/0.5ML pen Inject 1 mg subcutaneously once a week. 2 mL 4    triamcinolone (KENALOG) 0.1 % external cream Apply topically 2 times daily. 30 g 1     No current facility-administered medications for this visit.         4/22/2025     1:15 PM   Weight Loss Medication History Reviewed With Patient   Which weight loss medications are you currently taking on a regular basis? Wegovy   Are you having any side effects from the weight loss medication that we have prescribed you? No     ASSESSMENT:   Maintain Wegovy 1/w. Reinforce food plan.    FOLLOW-UP:    12 weeks.    Sincerely,  Silviano Redd MD

## 2025-04-28 NOTE — PROGRESS NOTES
Virtual Visit Details    Joined the call at 4/28/2025, 8:10:12 am.  Left the call at 4/28/2025, 8:13:24 a    Originating Location (pt. Location): Home    Distant Location (provider location):  Off-site  Platform used for Video Visit: Danielle

## 2025-04-28 NOTE — LETTER
"2025       RE: Shala Poole  [address]  [place] [address]     Dear Colleague,    Thank you for referring your patient, Shala Poole, to the [facility] [place] [facility]. Please see a copy of my visit note below.    Virtual Visit Details    Joined the call at 2025, 8:10:12 am.  Left the call at 2025, 8:13:24 a    Originating Location (pt. Location): Home    Distant Location (provider location):  Off-site  Platform used for Video Visit: [facility] Medical Weight Management Note     Shala Poole  MRN:  [id number]  :  1980  DANNIELLE:  2024    Dear Kristen M. Kehr, PA-C,    I had the pleasure of seeing your patient Shala Poole.  She is a 44 year old female who I am continuing to see for treatment of obesity related to:      2022     8:35 PM   --   I have the following health issues associated with obesity High Blood Pressure    GERD (Reflux)   I have the following symptoms associated with obesity Knee Pain    Depression    Back Pain    Fatigue    Hip Pain     CURRENT WEIGHT:   237 lbs 0 oz    Wt Readings from Last 4 Encounters:   25 107.5 kg (237 lb)   25 109.8 kg (242 lb)   25 107.5 kg (237 lb)   25 110.2 kg (243 lb)     Height:  5' 8\"  Body Mass Index:  Body mass index is 36.04 kg/m .  Vitals:  B/P: Data Unavailable, P: Data Unavailable    Initial consult weight was 276 on 22.  Weight change since last seen on 2024 is down 29 pounds.   Total loss is 41 pounds.    INTERVAL HISTORY:  Was able to get Wegovy covered by insurance, now on . No side effects of note and helping with food thoughts..        2022     8:35 PM   Diet Recall Review with Patient   Do you typically eat breakfast? No   Do you typically eat lunch? Yes   If you do eat lunch, what types of food do you typically eat? Fast food, frozen pizza   Do you " typically eat supper? Yes   If you do eat supper, what types of food do you typically eat? Meat and potatoes, pasta,   Do you typically eat snacks? Yes   If you do snack, what types of food do you typically eat? Chips and cheese, cookies, beef jerky, popcorn, cheese sticks,   Do you like vegetables?  Yes   Do you drink water? Yes   How many glasses of juice do you drink in a typical day? 0   How many of glasses of milk do you drink in a typical day? 0   If you do drink milk, what type? N/A   How many 8oz glasses of sugar containing drinks such as Abel-Aid/sweet tea do you drink in a day? 0   How many cans/bottles of sugar pop/soda/tea/sports drinks do you drink in a day? 3   How many cans/bottles of diet pop/soda/tea or sports drink do you drink in a day? 2   How often do you have a drink of alcohol? 2-4 Times a Month   If you do drink, how many drinks might you have in a day? 3-4     MEDICATIONS:   Current Outpatient Medications   Medication Sig Dispense Refill     acetaminophen (TYLENOL) 325 MG tablet Take 325-650 mg by mouth       albuterol (PROAIR HFA/PROVENTIL HFA/VENTOLIN HFA) 108 (90 Base) MCG/ACT inhaler Inhale 2 puffs into the lungs every 6 hours as needed for shortness of breath, wheezing or cough. 18 g 0     amLODIPine (NORVASC) 2.5 MG tablet Take 1 tablet (2.5 mg) by mouth daily. 90 tablet 3     FLUoxetine (PROZAC) 20 MG capsule Take 1 capsule (20 mg) by mouth daily. Take with 40 for total of 60 90 capsule 3     FLUoxetine (PROZAC) 40 MG capsule Take 1 capsule (40 mg) by mouth daily. 90 capsule 3     Multiple Vitamin (ONE-A-DAY ESSENTIAL) TABS Take 1 tablet by mouth daily.       Semaglutide-Weight Management (WEGOVY) 1 MG/0.5ML pen Inject 1 mg subcutaneously once a week. 2 mL 4     triamcinolone (KENALOG) 0.1 % external cream Apply topically 2 times daily. 30 g 1     No current facility-administered medications for this visit.         4/22/2025     1:15 PM   Weight Loss Medication History Reviewed With  Patient   Which weight loss medications are you currently taking on a regular basis? Wegovy   Are you having any side effects from the weight loss medication that we have prescribed you? No     ASSESSMENT:   Maintain Wegovy 1/w. Reinforce food plan.    FOLLOW-UP:    12 weeks.    Sincerely,  Silviano Redd MD      Again, thank you for allowing me to participate in the care of your patient.      Sincerely,    Silviano Redd MD

## 2025-04-29 ENCOUNTER — MYC MEDICAL ADVICE (OUTPATIENT)
Dept: FAMILY MEDICINE | Facility: CLINIC | Age: 45
End: 2025-04-29
Payer: COMMERCIAL

## 2025-04-29 DIAGNOSIS — M53.3 CHRONIC RIGHT SI JOINT PAIN: ICD-10-CM

## 2025-04-29 DIAGNOSIS — G89.29 CHRONIC RIGHT SI JOINT PAIN: ICD-10-CM

## 2025-04-29 DIAGNOSIS — M54.50 CHRONIC RIGHT-SIDED LOW BACK PAIN WITHOUT SCIATICA: Primary | ICD-10-CM

## 2025-04-29 DIAGNOSIS — G89.29 CHRONIC RIGHT-SIDED LOW BACK PAIN WITHOUT SCIATICA: Primary | ICD-10-CM

## 2025-04-30 ENCOUNTER — TELEPHONE (OUTPATIENT)
Dept: ENDOCRINOLOGY | Facility: CLINIC | Age: 45
End: 2025-04-30
Payer: COMMERCIAL

## 2025-04-30 NOTE — TELEPHONE ENCOUNTER
Phone number not valid?  Sent Mychart (1st Attempt) for the patient to call back and schedule the following:    Appointment type: Return Weight Management  Appointment mode: In Person or Virtual Visit  Provider: Dr. Silviano Redd  Return date: Approx. 10/28/25  Specialty phone number: [phone]    Additional Notes:

## 2025-05-04 LAB — CV ZIO PRELIM RESULTS: NORMAL

## 2025-05-05 NOTE — TELEPHONE ENCOUNTER
Patient left her left Resound ROXI hearing aid at the [facility] clinic.  Serial number is [id number].  Aid needs a new #2HP left .  Cost will be $125 if she wants us to repair it here.  The aid is out of warranty.  I left message asking her to call me back and let me know if she wants to do this.    Gunnar Guillory MA, CCC-A  [place] Licensed Audiologist #[id number]    2/13/2019         all other ROS negative except as per HPI

## 2025-05-06 ENCOUNTER — ALLIED HEALTH/NURSE VISIT (OUTPATIENT)
Dept: AUDIOLOGY | Facility: CLINIC | Age: 45
End: 2025-05-06
Payer: COMMERCIAL

## 2025-05-06 DIAGNOSIS — H90.3 BILATERAL SENSORINEURAL HEARING LOSS: Primary | ICD-10-CM

## 2025-05-06 PROCEDURE — V5299 HEARING SERVICE: HCPCS | Performed by: AUDIOLOGIST

## 2025-05-06 NOTE — PROGRESS NOTES
HEARING AID DROP-OFF    Patient Name:  Shala Poole    Patient Age:   45 year old    :  1980    Background:   Patient dropped off their hearing aid with the report of broken.      SIDE: Right    : [employer]    TYPE: Audep P50-13T ROXI    S/N: [id number]     WARRANTY: 10/5/2025    Procedures:   The waxguard grommet is broken loose and hearing aid and Cshell were sent to [employer] for in warranty repair.     Plan:   Patient was contacted in regards to status of hearing aid dropped off today. [email or web address] message sent. Patient will be contacted again once the hearing aid is back and ready for .     NO CHARGE VISIT    [place] CCC-A  Licensed Audiologist  2025

## 2025-05-27 ENCOUNTER — OFFICE VISIT (OUTPATIENT)
Dept: CARDIOLOGY | Facility: CLINIC | Age: 45
End: 2025-05-27
Payer: COMMERCIAL

## 2025-05-27 VITALS
WEIGHT: 236.2 LBS | HEIGHT: 68 IN | DIASTOLIC BLOOD PRESSURE: 74 MMHG | HEART RATE: 117 BPM | BODY MASS INDEX: 35.8 KG/M2 | OXYGEN SATURATION: 98 % | SYSTOLIC BLOOD PRESSURE: 109 MMHG

## 2025-05-27 DIAGNOSIS — I10 PRIMARY HYPERTENSION: ICD-10-CM

## 2025-05-27 DIAGNOSIS — R42 LIGHTHEADEDNESS: Primary | ICD-10-CM

## 2025-05-27 PROCEDURE — G2211 COMPLEX E/M VISIT ADD ON: HCPCS | Performed by: INTERNAL MEDICINE

## 2025-05-27 PROCEDURE — 3078F DIAST BP <80 MM HG: CPT | Performed by: INTERNAL MEDICINE

## 2025-05-27 PROCEDURE — 99204 OFFICE O/P NEW MOD 45 MIN: CPT | Performed by: INTERNAL MEDICINE

## 2025-05-27 PROCEDURE — 3074F SYST BP LT 130 MM HG: CPT | Performed by: INTERNAL MEDICINE

## 2025-05-27 NOTE — PATIENT INSTRUCTIONS
Stop amlodipine.  Adequate hydration.  [place] salt intake.  Compression stocking.  Lower body resistance training.  Echo - I will contact you with results.  Follow up in 2 months.

## 2025-05-27 NOTE — LETTER
5/27/2025    Kristen M. Kehr, HERB  [address] [facility] [address]    RE: Shala Poole       Dear Colleague,     I had the pleasure of seeing Shala Poole in the [facility].  CARDIOLOGY CLINIC INITIAL VISIT NOTE    Patient name: Shala Poole  Age: 45 year old  Sex: female  YOB: 1980  Primary Care Physician: Kristen M. Kehr      CHIEF COMPLAINT: Postural/orthostatic lightheadedness.      HPI: Shala Poole is a 45 year old female with past medical history pertinent for hypertension, depression, asthma, early onset hearing loss, obstructive sleep apnea, recent gallstone pancreatitis status post laparoscopic cholecystectomy in March 2025, obesity (using Wegovy), and several other noncardiac comorbidities presents for cardiovascular evaluation for postural/orthostatic lightheadedness.      She describes a longstanding history of postural/orthostatic lightheadedness; she demonstrated in a grocery line; present since her late teenage years; more prominent after hospitalization for gallstone pancreatitis in March 2029 when she underwent laparoscopic cholecystectomy.  Additionally I would note that she has a history of hypertension treated with amlodipine 2.5 mg daily and also has a history of obesity currently treated with Wegovy.  She has lost approximately 50 pounds over the past 1 year.  She tries to maintain adequate hydration.  She denies chest pain, dyspnea, palpitations, presyncope, syncope, and lower extremity edema.  She does have a history of early onset hearing loss and tinnitus; however no vertigo.  She underwent a [employer] heart monitor which was within normal limits (normal sinus rhythm with average heart rate 93 bpm; no arrhythmia).    Orthostatic measurements in the office were performed today; lying down blood pressure 127/87 with heart rate 101 bpm, sitting blood pressure 107/78 with pulse 115, standing blood pressure 113/83 with pulse  121.        REVIEW OF SYSTEMS: A 10-point ROS was performed. Pertinent positives are mentioned above in the HPI. Remainder of systems were reviewed and are negative.      PAST MEDICAL AND SURGICAL HISTORY:  I have reviewed this patient's past medical and surgical history.    Past Medical History:   Diagnosis Date     Benign essential hypertension      Depressive disorder        Past Surgical History:   Procedure Laterality Date     DENTAL SURGERY  1999    wisdom teeth     HYSTERECTOMY, PAP NO LONGER INDICATED  12/18/2013    due to excessive bleeding - ovaries remain     ORTHOPEDIC SURGERY  2012    cyst removal from left wrist      SALPINGECTOMY  12/18/2013    ROBOTIC ASSISTED BILATERAL SALPINGECTOMY     TUBAL LIGATION  2007         FAMILY HISTORY:  I have reviewed this patient's family history.    Family History   Problem Relation Age of Onset     Other - See Comments Mother         Fibromyalga.      Thyroid Disease Mother      Depression Mother      Diabetes Father      Chronic Obstructive Pulmonary Disease Father      Asthma Father      Heart Failure Father      Obesity Father      Ovarian Cancer Maternal Grandfather      Diabetes Paternal Grandmother      Colon Cancer Other            SOCIAL HISTORY:  I have reviewed this patient's social history.    Social History     Socioeconomic History     Marital status:    Occupational History     Occupation:  for [employer]   Tobacco Use     Smoking status: Never     Passive exposure: Never     Smokeless tobacco: Never   Vaping Use     Vaping status: Never Used   Substance and Sexual Activity     Alcohol use: Yes     Comment: Some weekends a couple drinks socially     Drug use: No     Sexual activity: Yes     Partners: Male     Birth control/protection: Female Surgical   Other Topics Concern     Parent/sibling w/ CABG, MI or angioplasty before 65F 55M? No     Social Drivers of Health     Financial Resource Strain: Low Risk  (3/27/2025)     [facility]    Financial Resource Strain      Difficulty of Paying Living Expenses: 3   Food Insecurity: No Food Insecurity (3/27/2025)    [facility]    Food Insecurity      Do you worry your food will run out before you are able to buy more?: 1   Transportation Needs: No Transportation Needs (3/27/2025)    Received from [facility]    Transportation Needs      Does lack of transportation keep you from medical appointments?: 1      Does lack of transportation keep you from work, meetings or getting things that you need?: 1   Physical Activity: Insufficiently Active (10/7/2024)    Exercise Vital Sign      Days of Exercise per Week: 1 day      Minutes of Exercise per Session: 10 min   Stress: Stress Concern Present (10/7/2024)    [employer] [place] [employer] - Occupational Stress Questionnaire      Feeling of Stress : Very much   Social Connections: Socially Integrated (3/27/2025)    [facility]    Social Connections      Do you often feel lonely or isolated from those around you?: 0   Interpersonal Safety: Low Risk  (10/10/2024)    Interpersonal Safety      Do you feel physically and emotionally safe where you currently live?: Yes      Within the past 12 months, have you been hit, slapped, kicked or otherwise physically hurt by someone?: No      Within the past 12 months, have you been humiliated or emotionally abused in other ways by your partner or ex-partner?: No   Housing Stability: Low Risk  (3/27/2025)    [facility] from [facility]    Housing Stability      What is your housing situation today?: 1         CURRENT MEDICATIONS:  I have reviewed this patient's current medications.    Current Outpatient Medications   Medication Sig Dispense Refill     acetaminophen (TYLENOL) 325 MG tablet Take  325-650 mg by mouth       albuterol (PROAIR HFA/PROVENTIL HFA/VENTOLIN HFA) 108 (90 Base) MCG/ACT inhaler Inhale 2 puffs into the lungs every 6 hours as needed for shortness of breath, wheezing or cough. 18 g 0     amLODIPine (NORVASC) 2.5 MG tablet Take 1 tablet (2.5 mg) by mouth daily. 90 tablet 3     FLUoxetine (PROZAC) 20 MG capsule Take 1 capsule (20 mg) by mouth daily. Take with 40 for total of 60 90 capsule 3     FLUoxetine (PROZAC) 40 MG capsule Take 1 capsule (40 mg) by mouth daily. 90 capsule 3     Multiple Vitamin (ONE-A-DAY ESSENTIAL) TABS Take 1 tablet by mouth daily.       Semaglutide-Weight Management (WEGOVY) 1 MG/0.5ML pen Inject 1 mg subcutaneously once a week. 2 mL 11     triamcinolone (KENALOG) 0.1 % external cream Apply topically 2 times daily. 30 g 1         ALLERGIES/SENSITIVITIES:  I have reviewed this patient's allergies.     No Known Allergies      PHYSICAL EXAM: Orthostatic measurements in the office were performed today; lying down blood pressure 127/87 with heart rate 101 bpm, sitting blood pressure 107/78 with pulse 115, standing blood pressure 113/83 with pulse 121.    GENERAL APPEARANCE: No acute distress, awake, alert.    HEENT: No scleral icterus or conjuctival hemorrhage, mucous membranes are moist.    NECK: Supple without thyromegaly. No carotid bruits are present. Normal jugular venous pressure.    CVS: Regular rate and rhythm with normal S1 and S2, no S3 or S4 gallop is present.    LUNG: Clear to auscultation without crackles or wheezes. Respiratory effort is normal.    GASTROINTESTINAL: Abdomen is soft and non-tender with normal bowel sounds.    EXTREMITIES: No clubbing or cyanosis. No edema. Normal and symmetric pulses.    PSYCHIATRIC: Normal affect.    NEUROLOGIC: Alert and appropriate. No obvious focal deficits.      LABS AND DIAGNOSTICS:    CBC:  Lab Results   Component Value Date    WBC 8.1 04/18/2025    RBC 4.06 04/18/2025    HGB 11.8 04/18/2025    HCT 36.0 04/18/2025     "MCV 89 04/18/2025    MCH 29.1 04/18/2025    MCHC 32.8 04/18/2025    RDW 13.0 04/18/2025     04/18/2025       BMP:  Lab Results   Component Value Date     04/18/2025     02/02/2021    POTASSIUM 3.7 04/18/2025    POTASSIUM 3.5 09/20/2022    POTASSIUM 3.7 02/02/2021    CHLORIDE 105 04/18/2025    CHLORIDE 108 09/20/2022    CHLORIDE 104 02/02/2021    CO2 24 04/18/2025    CO2 27 09/20/2022    CO2 30 02/02/2021    ANIONGAP 11 04/18/2025    ANIONGAP 6 09/20/2022    ANIONGAP 4 02/02/2021    GLC 90 04/18/2025     (H) 09/20/2022    GLC 85 02/02/2021    BUN 14.6 04/18/2025    BUN 12 09/20/2022    BUN 22 02/02/2021    CR 0.89 04/18/2025    CR 0.96 02/02/2021    GFRESTIMATED 82 04/18/2025    GFRESTIMATED 74 02/02/2021    GFRESTBLACK 85 02/02/2021    DEBI 9.4 04/18/2025    DEBI 10.1 02/02/2021       LIPIDS:  Lab Results   Component Value Date    CHOL 163 10/10/2024    CHOL 205 (H) 02/02/2021    HDL 31 (L) 10/10/2024    HDL 37 (L) 02/02/2021    LDL 88 10/10/2024     (H) 02/02/2021    TRIG 220 (H) 10/10/2024    TRIG 296 (H) 02/02/2021       LFT:  Lab Results   Component Value Date    PROTTOTAL 7.2 04/18/2025    ALBUMIN 4.4 04/18/2025    ALBUMIN 3.4 04/16/2022    BILITOTAL 0.5 04/18/2025    ALKPHOS 82 04/18/2025    AST 25 04/18/2025    ALT 20 04/18/2025       TFT:  Lab Results   Component Value Date    TSH 1.29 01/07/2025    TSH 1.90 09/20/2022    TSH 2.60 02/02/2021       HgbA1c:   Lab Results   Component Value Date    A1C 4.9 10/10/2024    A1C 5.1 04/16/2022    A1C 4.8 02/02/2021       INR RESULTS:  No results found for: \"INR\"    ECG (personally reviewed):  4/18/2025: Normal sinus rhythm at 91 bpm.  No delta wave.  Normal QTc 410 ms.    Chest X-ray (personally reviewed) 1/7/2025:  IMPRESSION: Negative chest.     Echocardiogram:  Not performed.      Zio/Holter/Event monitor 5/2/2025: Personally reviewed.  Normal sinus rhythm with average heart rate of 93 bpm.  Less than 1% PACs and PVCs within normal " "limits.  No significant arrhythmia.  7-day recording.  Rare ectopic beats, including a single 6-beat SVT run (103 bpm).  Diary entries for \"lightheadedness, irregular beats\" correlated with sinus rhythm or mild sinus tachycardia.         IMPRESSION AND PLAN: Shala Poole is a 45 year old female with past medical history pertinent for hypertension, depression, asthma, obstructive sleep apnea, recent gallstone pancreatitis status post laparoscopic cholecystectomy in March 2025, obesity (using Wegovy), and several other noncardiac comorbidities presents for cardiovascular evaluation for postural/orthostatic lightheadedness.      Postural/orthostatic lightheadedness: She does not meet formal criteria for POTS syndrome; she had a 20 beat increase in heart rate with standing up; however she has POTS-like physiology with fairly classic orthostatic intolerance, especially with prolonged standing.  I reviewed her [employer] heart monitor which is within normal limits (sinus rhythm with average heart rate 93 bpm; events correlating with normal sinus rhythm).  Normal recent TSH level.  We spent some time discussing physiology associated with POTS syndrome as well as management strategies. I would recommend an echocardiogram to complete evaluation and to rule out structural heart disease.  I expect this will likely be within normal limits.  Notably normal blood work.  Her postural/orthostatic lightheadedness may have been worsened by deconditioning following her recent hospitalization as well as 50 pounds of weight loss (Wegovy).  At this time I recommend stopping amlodipine 2.5 mg daily.  Will keep an eye on her blood pressure to ensure she remains normotensive.  I also recommended conservative measures such as adequate hydration, liberal salt intake, compression stockings, and lower extremity resistance exercises.  I will see her back in clinic in 2 months for reassessment.     Hypertension: Normotensive; on low-dose " amlodipine 2.5 mg daily.  Approximately 50 pounds of weight loss over the past year.  At this time I would recommend discontinuing amlodipine and seeing if she maintains normotensive status without it.  She has had significant weight loss and may no longer be hypertensive at this point.    Screening: Normal recent TSH level.  Normal LDL of 88 in 2024.        Thank you for the opportunity to participate in the care of this patient. Please do not hesitate to contact me with any questions or concerns.    CC: Kristen M Kehr, PA-C  [id number] [place] [address]    Today's clinic visit entailed:    40 minutes spent by me on the date of the encounter doing chart review, review of test results, interpretation of tests, patient visit, and documentation   Provider  Link to [facility] Help Grid     The level of medical decision making during this visit was of moderate complexity.  The longitudinal plan of care for the diagnosis(es)/condition(s) as documented were addressed during this visit. Due to the added complexity in care, I will continue to support Shala in the subsequent management and with ongoing continuity of care.     Tresa Ross MD, FACC, FASE, [facility]    Thank you for allowing me to participate in the care of your patient.      Sincerely,     Tresa Ross MD     [facility]  cc:   Kristen M Kehr, PA-C  [id number] [place] [address]

## 2025-06-11 ENCOUNTER — MYC MEDICAL ADVICE (OUTPATIENT)
Dept: FAMILY MEDICINE | Facility: CLINIC | Age: 45
End: 2025-06-11

## 2025-06-11 ENCOUNTER — OFFICE VISIT (OUTPATIENT)
Dept: URGENT CARE | Facility: URGENT CARE | Age: 45
End: 2025-06-11
Payer: COMMERCIAL

## 2025-06-11 ENCOUNTER — ANCILLARY PROCEDURE (OUTPATIENT)
Dept: CARDIOLOGY | Facility: CLINIC | Age: 45
End: 2025-06-11
Attending: INTERNAL MEDICINE
Payer: COMMERCIAL

## 2025-06-11 ENCOUNTER — RESULTS FOLLOW-UP (OUTPATIENT)
Dept: CARDIOLOGY | Facility: CLINIC | Age: 45
End: 2025-06-11

## 2025-06-11 VITALS
RESPIRATION RATE: 16 BRPM | OXYGEN SATURATION: 99 % | HEART RATE: 83 BPM | BODY MASS INDEX: 36.19 KG/M2 | TEMPERATURE: 99 F | DIASTOLIC BLOOD PRESSURE: 84 MMHG | SYSTOLIC BLOOD PRESSURE: 140 MMHG | WEIGHT: 238 LBS

## 2025-06-11 DIAGNOSIS — R42 LIGHTHEADEDNESS: ICD-10-CM

## 2025-06-11 DIAGNOSIS — R20.8 SKIN PAIN: Primary | ICD-10-CM

## 2025-06-11 LAB
BI-PLANE LVEF ECHO: NORMAL
LVEF ECHO: NORMAL

## 2025-06-11 PROCEDURE — 99213 OFFICE O/P EST LOW 20 MIN: CPT | Performed by: PHYSICIAN ASSISTANT

## 2025-06-11 PROCEDURE — 3079F DIAST BP 80-89 MM HG: CPT | Performed by: PHYSICIAN ASSISTANT

## 2025-06-11 PROCEDURE — 3077F SYST BP >= 140 MM HG: CPT | Performed by: PHYSICIAN ASSISTANT

## 2025-06-11 PROCEDURE — 93306 TTE W/DOPPLER COMPLETE: CPT | Performed by: INTERNAL MEDICINE

## 2025-06-11 ASSESSMENT — ENCOUNTER SYMPTOMS
ALLERGIC/IMMUNOLOGIC NEGATIVE: 1
DIZZINESS: 0
NECK PAIN: 0
JOINT SWELLING: 0
CHILLS: 0
EYES NEGATIVE: 1
RESPIRATORY NEGATIVE: 1
BACK PAIN: 0
NAUSEA: 0
DIARRHEA: 0
NECK STIFFNESS: 0
SORE THROAT: 0
MUSCULOSKELETAL NEGATIVE: 1
FEVER: 0
VOMITING: 0
ENDOCRINE NEGATIVE: 1
MYALGIAS: 0
WOUND: 0
ROS SKIN COMMENTS: SKIN PAIN
LIGHT-HEADEDNESS: 0
COUGH: 0
CARDIOVASCULAR NEGATIVE: 1
WEAKNESS: 0
SHORTNESS OF BREATH: 0
HEADACHES: 0
ARTHRALGIAS: 0
PALPITATIONS: 0
RHINORRHEA: 0

## 2025-06-11 NOTE — TELEPHONE ENCOUNTER
Sounds like patient could have Zoster Sine Herpete?  Would you like an E-visit? Or should patient be seen?    Jessica WILLISN, RN

## 2025-06-11 NOTE — PROGRESS NOTES
Chief Complaint:    Chief Complaint   Patient presents with    Derm Problem     Pt states that since Thursday she has had a burning pain on the right side of her chest, arm, and back area. No sign of redness or rash. Pt states that she has had chicken pox before     Medical Decision Making:    Vital signs reviewed by Fransico Gonzalez PA-C  BP (!) 140/84 (BP Location: Right arm, Patient Position: Sitting)   Pulse 83   Temp 99  F (37.2  C) (Tympanic)   Resp 16   Wt 108 kg (238 lb)   LMP  (LMP Unknown)   SpO2 99%   BMI 36.19 kg/m      Differential Diagnosis:  Shingles, neuropathy     ASSESSMENT:     1. Skin pain         PLAN:    Patient is in no acute distress.  Symptoms in dermatomal pattern consistent with shingles.  Valtrex not indicated due to length of symptoms.    Patient given handout on shingles care.     Patient instructed to follow up with PCP in 1 week if symptoms are not improving.  Sooner if symptoms worsen.  Worrisome symptoms discussed with instructions to go to the ED.  Patient verbalized understanding and agreed with this plan.    Labs:     Results for orders placed or performed in visit on 25   Echocardiogram Complete     Status: None   Result Value Ref Range    LVEF  60-65%     Biplane LVEF 61%     Narrative    [id number]  [id number]  [id number]  [id number]^INES^TRESA^S     [facility], Echocardiography Laboratory  [address] [place] [address]     Name: MAREK POTTER  MRN: [id number]  : 1980  Study Date: 2025 07:41 AM  Age: 45 yrs  Gender: Female  Patient Location: [facility]  Reason For Study: Lightheadedness  Ordering Physician: Tresa Ross MD  Referring Physician: Tresa Ross MD  Performed By: [facility]iel[facility]line [facility]     BSA: 2.2 m2  Height: 68 in  Weight: 236 lb  BP: 125/68 mmHg  ______________________________________________________________________________  Procedure  Echocardiogram with two-dimensional, color and spectral  Doppler.  ______________________________________________________________________________  Interpretation Summary     Global and regional left ventricular function is normal with an EF of 60-65%.  Left ventricular diastolic function is normal.  Right ventricular function, chamber size, wall motion, and thickness are  normal.  No significant valvular abnormalities present.  There is no prior study for direct comparison.  ______________________________________________________________________________  Left Ventricle  Global and regional left ventricular function is normal with an EF of 60-65%.  Left ventricular size is normal. Left ventricular wall thickness is normal.  Biplane LVEF is 61%. Left ventricular diastolic function is normal. Diastolic  Doppler findings (E/E' ratio and/or other parameters) suggest left ventricular  filling pressures are normal. No regional wall motion abnormalities are seen.     Right Ventricle  Right ventricular function, chamber size, wall motion, and thickness are  normal.     Atria  Both atria appear normal.     Mitral Valve  The mitral valve is normal. Mitral leaflet thickness is normal.     Aortic Valve  Aortic valve is normal in structure and function. The aortic valve is  tricuspid.     Tricuspid Valve  The tricuspid valve is normal. The peak velocity of the tricuspid regurgitant  jet is not obtainable. Pulmonary artery systolic pressure cannot be assessed.     Pulmonic Valve  The valve leaflets are not well visualized. Trace pulmonic insufficiency is  present.     Vessels  The aorta root is normal. The inferior vena cava cannot be assessed. Sinuses  of Valsalva 3.4 cm. Ascending aorta 3.1 cm.     Pericardium  No pericardial effusion is present. Prominent epicardial fat is noted.     Miscellaneous  No significant valvular abnormalities present.     Compared to Previous Study  There is no prior study for direct comparison.     Attestation  I have personally viewed the imaging and agree  with the interpretation and  report as documented by the fellow, Brock Min, and/or edited by me.  ______________________________________________________________________________  MMode/2D Measurements & Calculations  IVSd: 0.97 cm  LVIDd: 4.3 cm  LVIDs: 2.3 cm  LVPWd: 0.83 cm  FS: 47.2 %  LV mass(C)d: 123.6 grams  LV mass(C)dI: 56.4 grams/m2  Ao root diam: 3.4 cm  asc Aorta Diam: 3.1 cm  LVOT diam: 2.3 cm  LVOT area: 4.3 cm2  Ao root diam index Ht(cm/m): 2.0  Ao root diam index BSA (cm/m2): 1.6  Asc Ao diam index BSA (cm/m2): 1.4     Asc Ao diam index Ht(cm/m): 1.8  EF Biplane: 60.8 %  LA Volume (BP): 31.0 ml  LA Volume Index (BP): 14.2 ml/m2  RV Base: 3.0 cm  RWT: 0.38  TAPSE: 2.1 cm     Doppler Measurements & Calculations  MV E max jolly: 76.7 cm/sec  MV A max jolly: 55.7 cm/sec  MV E/A: 1.4     MV dec time: 0.17 sec  PA acc time: 0.11 sec  E/E' av.7  Lateral E/e': 6.3  Medial E/e': 7.1     ______________________________________________________________________________  Report approved by: Everette Arciniega MD on 2025 08:52 AM             Current Meds:    Current Outpatient Medications:     acetaminophen (TYLENOL) 325 MG tablet, Take 325-650 mg by mouth, Disp: , Rfl:     albuterol (PROAIR HFA/PROVENTIL HFA/VENTOLIN HFA) 108 (90 Base) MCG/ACT inhaler, Inhale 2 puffs into the lungs every 6 hours as needed for shortness of breath, wheezing or cough., Disp: 18 g, Rfl: 0    FLUoxetine (PROZAC) 20 MG capsule, Take 1 capsule (20 mg) by mouth daily. Take with 40 for total of 60, Disp: 90 capsule, Rfl: 3    FLUoxetine (PROZAC) 40 MG capsule, Take 1 capsule (40 mg) by mouth daily., Disp: 90 capsule, Rfl: 3    Multiple Vitamin (ONE-A-DAY ESSENTIAL) TABS, Take 1 tablet by mouth daily., Disp: , Rfl:     Semaglutide-Weight Management (WEGOVY) 1 MG/0.5ML pen, Inject 1 mg subcutaneously once a week., Disp: 2 mL, Rfl: 11    triamcinolone (KENALOG) 0.1 % external cream, Apply topically 2 times daily., Disp: 30 g, Rfl:  1    Allergies:  No Known Allergies    SUBJECTIVE    HPI: Shala Poole is an 45 year old female who presents for evaluation and treatment of burning sensation on the skin.  Patient states that the symptoms started roughly 6 days ago.  She has not noticed any rash.  She has not tried anything for the symptoms.  She has had chicken pox in the past.   No fever, chills, nausea or vomiting.      ROS:      Review of Systems   Constitutional:  Negative for chills and fever.   HENT:  Negative for congestion, ear pain, rhinorrhea and sore throat.    Eyes: Negative.    Respiratory: Negative.  Negative for cough and shortness of breath.    Cardiovascular: Negative.  Negative for chest pain and palpitations.   Gastrointestinal:  Negative for diarrhea, nausea and vomiting.   Endocrine: Negative.    Genitourinary: Negative.    Musculoskeletal: Negative.  Negative for arthralgias, back pain, joint swelling, myalgias, neck pain and neck stiffness.   Skin: Negative.  Negative for rash and wound.        Skin Pain   Allergic/Immunologic: Negative.  Negative for immunocompromised state.   Neurological:  Negative for dizziness, weakness, light-headedness and headaches.        Family History   Family History   Problem Relation Age of Onset    Other - See Comments Mother         Fibromyalga.     Thyroid Disease Mother     Depression Mother     Diabetes Father     Chronic Obstructive Pulmonary Disease Father     Asthma Father     Heart Failure Father     Obesity Father     Ovarian Cancer Maternal Grandfather     Diabetes Paternal Grandmother     Colon Cancer Other        Social History  Social History     Socioeconomic History    Marital status:      Spouse name: Not on file    Number of children: Not on file    Years of education: Not on file    Highest education level: Not on file   Occupational History    Occupation:  for [employer]   Tobacco Use    Smoking status: Never     Passive exposure: Never     Smokeless tobacco: Never   Vaping Use    Vaping status: Never Used   Substance and Sexual Activity    Alcohol use: Yes     Comment: Some weekends a couple drinks socially    Drug use: No    Sexual activity: Yes     Partners: Male     Birth control/protection: Female Surgical   Other Topics Concern    Parent/sibling w/ CABG, MI or angioplasty before 65F 55M? No   Social History Narrative    Not on file     Social Drivers of Health     Financial Resource Strain: Low Risk  (3/27/2025)    [facility]    Financial Resource Strain     Difficulty of Paying Living Expenses: 3     Difficulty of Paying Living Expenses: Not on file   Food Insecurity: No Food Insecurity (3/27/2025)    [facility]    Food Insecurity     Do you worry your food will run out before you are able to buy more?: 1   Transportation Needs: No Transportation Needs (3/27/2025)    Received from [facility]    Transportation Needs     Does lack of transportation keep you from medical appointments?: 1     Does lack of transportation keep you from work, meetings or getting things that you need?: 1   Physical Activity: Insufficiently Active (10/7/2024)    Exercise Vital Sign     Days of Exercise per Week: 1 day     Minutes of Exercise per Session: 10 min   Stress: Stress Concern Present (10/7/2024)    [place] [employer] - Occupational Stress Questionnaire     Feeling of Stress : Very much   Social Connections: Socially Integrated (3/27/2025)    [facility]    Social Connections     Do you often feel lonely or isolated from those around you?: 0   Interpersonal Safety: Low Risk  (10/10/2024)    Interpersonal Safety     Do you feel physically and emotionally safe where you currently live?: Yes     Within the past 12 months, have you been hit, slapped, kicked or otherwise  physically hurt by someone?: No     Within the past 12 months, have you been humiliated or emotionally abused in other ways by your partner or ex-partner?: No   Housing Stability: Low Risk  (3/27/2025)    Received from [employer] [facility]    Housing Stability     What is your housing situation today?: 1        Surgical History:  Past Surgical History:   Procedure Laterality Date    DENTAL SURGERY  1999    wisdom teeth    HYSTERECTOMY, PAP NO LONGER INDICATED  12/18/2013    due to excessive bleeding - ovaries remain    ORTHOPEDIC SURGERY  2012    cyst removal from left wrist     SALPINGECTOMY  12/18/2013    ROBOTIC ASSISTED BILATERAL SALPINGECTOMY    TUBAL LIGATION  2007        Problem List:  Patient Active Problem List   Diagnosis    Major depressive disorder, single episode, mild    CARDIOVASCULAR SCREENING; LDL GOAL LESS THAN 160    Fatigue, unspecified type    Hearing aid worn    Sensorineural hearing loss (SNHL) of both ears    HTN (hypertension)    ARIANNE (obstructive sleep apnea)    Anemia    Cholelithiasis    Gallstone pancreatitis    BMI 36.0-36.9,adult        OBJECTIVE:     Vital signs noted and reviewed by Fransico Gonzalez PA-C  BP (!) 140/84 (BP Location: Right arm, Patient Position: Sitting)   Pulse 83   Temp 99  F (37.2  C) (Tympanic)   Resp 16   Wt 108 kg (238 lb)   LMP  (LMP Unknown)   SpO2 99%   BMI 36.19 kg/m       PEFR:    Physical Exam  Vitals and nursing note reviewed.   Constitutional:       General: She is not in acute distress.     Appearance: She is well-developed. She is not ill-appearing, toxic-appearing or diaphoretic.   HENT:      Head: Normocephalic and atraumatic.      Right Ear: Tympanic membrane and external ear normal. No drainage, swelling or tenderness. Tympanic membrane is not perforated, erythematous, retracted or bulging.      Left Ear: Tympanic membrane and external ear normal. No drainage, swelling or tenderness. Tympanic membrane is not  perforated, erythematous, retracted or bulging.      Nose: No mucosal edema, congestion or rhinorrhea.      Right Sinus: No maxillary sinus tenderness or frontal sinus tenderness.      Left Sinus: No maxillary sinus tenderness or frontal sinus tenderness.      Mouth/Throat:      Pharynx: No pharyngeal swelling, oropharyngeal exudate, posterior oropharyngeal erythema or uvula swelling.      Tonsils: No tonsillar abscesses.   Eyes:      Pupils: Pupils are equal, round, and reactive to light.   Neck:      Trachea: Trachea normal.   Cardiovascular:      Rate and Rhythm: Normal rate and regular rhythm.      Heart sounds: Normal heart sounds, S1 normal and S2 normal. No murmur heard.     No friction rub. No gallop.   Pulmonary:      Effort: Pulmonary effort is normal. No respiratory distress.      Breath sounds: Normal breath sounds. No decreased breath sounds, wheezing, rhonchi or rales.   Abdominal:      General: Bowel sounds are normal. There is no distension.      Palpations: Abdomen is soft. Abdomen is not rigid. There is no mass.      Tenderness: There is no abdominal tenderness. There is no guarding or rebound.   Musculoskeletal:      Cervical back: Full passive range of motion without pain, normal range of motion and neck supple.   Lymphadenopathy:      Cervical: No cervical adenopathy.   Skin:     General: Skin is warm and dry.      Comments: No visible rash.     Neurological:      Mental Status: She is alert and oriented to person, place, and time.      Cranial Nerves: No cranial nerve deficit.      Deep Tendon Reflexes: Reflexes are normal and symmetric.   Psychiatric:         Behavior: Behavior normal. Behavior is cooperative.         Thought Content: Thought content normal.         Judgment: Judgment normal.             Fransico Gonzalez PA-C  6/11/2025, 4:29 PM

## 2025-06-11 NOTE — PROGRESS NOTES
Urgent Care Clinic Visit    Chief Complaint   Patient presents with    Derm Problem     Pt states that since Thursday she has had a burning pain on the right side of her chest, arm, and back area. No sign of redness or rash              6/11/2025     4:34 PM   Additional Questions   Roomed by Katrin   Accompanied by self

## 2025-06-14 ENCOUNTER — ANCILLARY PROCEDURE (OUTPATIENT)
Dept: MAMMOGRAPHY | Facility: CLINIC | Age: 45
End: 2025-06-14
Attending: PHYSICIAN ASSISTANT
Payer: COMMERCIAL

## 2025-06-14 DIAGNOSIS — Z12.31 VISIT FOR SCREENING MAMMOGRAM: ICD-10-CM

## 2025-06-14 PROCEDURE — 77063 BREAST TOMOSYNTHESIS BI: CPT | Mod: TC | Performed by: STUDENT IN AN ORGANIZED HEALTH CARE EDUCATION/TRAINING PROGRAM

## 2025-06-14 PROCEDURE — 77067 SCR MAMMO BI INCL CAD: CPT | Mod: TC | Performed by: STUDENT IN AN ORGANIZED HEALTH CARE EDUCATION/TRAINING PROGRAM

## 2025-06-17 ASSESSMENT — PATIENT HEALTH QUESTIONNAIRE - PHQ9
SUM OF ALL RESPONSES TO PHQ QUESTIONS 1-9: 6
SUM OF ALL RESPONSES TO PHQ QUESTIONS 1-9: 6
10. IF YOU CHECKED OFF ANY PROBLEMS, HOW DIFFICULT HAVE THESE PROBLEMS MADE IT FOR YOU TO DO YOUR WORK, TAKE CARE OF THINGS AT HOME, OR GET ALONG WITH OTHER PEOPLE: SOMEWHAT DIFFICULT

## 2025-06-18 ENCOUNTER — OFFICE VISIT (OUTPATIENT)
Dept: FAMILY MEDICINE | Facility: CLINIC | Age: 45
End: 2025-06-18
Payer: COMMERCIAL

## 2025-06-18 VITALS
OXYGEN SATURATION: 100 % | TEMPERATURE: 98 F | BODY MASS INDEX: 35.1 KG/M2 | RESPIRATION RATE: 16 BRPM | WEIGHT: 237 LBS | SYSTOLIC BLOOD PRESSURE: 128 MMHG | DIASTOLIC BLOOD PRESSURE: 89 MMHG | HEIGHT: 69 IN | HEART RATE: 105 BPM

## 2025-06-18 DIAGNOSIS — B02.9 HERPES ZOSTER WITHOUT COMPLICATION: Primary | ICD-10-CM

## 2025-06-18 PROCEDURE — 1125F AMNT PAIN NOTED PAIN PRSNT: CPT | Performed by: PHYSICIAN ASSISTANT

## 2025-06-18 PROCEDURE — 99213 OFFICE O/P EST LOW 20 MIN: CPT | Performed by: PHYSICIAN ASSISTANT

## 2025-06-18 PROCEDURE — 3079F DIAST BP 80-89 MM HG: CPT | Performed by: PHYSICIAN ASSISTANT

## 2025-06-18 PROCEDURE — 3074F SYST BP LT 130 MM HG: CPT | Performed by: PHYSICIAN ASSISTANT

## 2025-06-18 RX ORDER — GABAPENTIN 300 MG/1
300 CAPSULE ORAL 3 TIMES DAILY
Qty: 90 CAPSULE | Refills: 2 | Status: SHIPPED | OUTPATIENT
Start: 2025-06-18

## 2025-06-18 ASSESSMENT — PAIN SCALES - GENERAL: PAINLEVEL_OUTOF10: MODERATE PAIN (4)

## 2025-06-18 NOTE — PROGRESS NOTES
"  Assessment & Plan     Herpes zoster without complication  The dermatome affected is right thoracic. No rash and as Urgent Care already mentioned she is out of the window of treatment for valtrex and this will most likely not help.   She can increase the dose of the gabapentin to 300 mg three times daily. She can work with her current prescription of the 100 mg capsules and then start the 300 mg dose.   Plan to sent a [employer] message after a month to see how she is doing. We can try to either decrease the dose or continue at this dose if pain still persists.   - gabapentin (NEURONTIN) 300 MG capsule; Take 1 capsule (300 mg) by mouth 3 times daily.          BMI  Estimated body mass index is 35 kg/m  as calculated from the following:    Height as of this encounter: 1.753 m (5' 9\").    Weight as of this encounter: 107.5 kg (237 lb).             Subjective   Shala is a 45 year old, presenting for the following health issues:  Shingles (Follow up- still having pain from shingle under right arm)      6/18/2025     8:33 AM   Additional Questions   Roomed by Thomas TELLES MA     History of Present Illness       Reason for visit:  Shingles Pain    She eats 2-3 servings of fruits and vegetables daily.She consumes 2 sweetened beverage(s) daily.She exercises with enough effort to increase her heart rate 9 or less minutes per day.  She exercises with enough effort to increase her heart rate 3 or less days per week. She is missing 1 dose(s) of medications per week.  She is not taking prescribed medications regularly due to remembering to take.        Shala is here today to discuss management of nerve pain from shingles.   She started the gabapentin 100 mg after the evisit and is tolerating it well. She thinks it is helping a bit, but has only had 3 -4 doses. No side effects reported.               Review of Systems  Constitutional, HEENT, cardiovascular, pulmonary, GI, , musculoskeletal, neuro, skin, endocrine and psych systems " "are negative, except as otherwise noted.      Objective    /89   Pulse 105   Temp 98  F (36.7  C) (Tympanic)   Resp 16   Ht 1.753 m (5' 9\")   Wt 107.5 kg (237 lb)   LMP  (LMP Unknown)   SpO2 100%   Breastfeeding No   BMI 35.00 kg/m    Body mass index is 35 kg/m .  Physical Exam   GENERAL: alert and no distress  MS: no gross musculoskeletal defects noted, no edema  SKIN: no suspicious lesions or rashes  PSYCH: mentation appears normal, affect normal/bright            Signed Electronically by: Kristen M. Kehr, PA-C    "

## 2025-08-13 ENCOUNTER — OFFICE VISIT (OUTPATIENT)
Dept: PEDIATRICS | Facility: CLINIC | Age: 45
End: 2025-08-13
Payer: COMMERCIAL

## 2025-08-13 ENCOUNTER — ANCILLARY PROCEDURE (OUTPATIENT)
Dept: GENERAL RADIOLOGY | Facility: CLINIC | Age: 45
End: 2025-08-13
Attending: PHYSICIAN ASSISTANT
Payer: COMMERCIAL

## 2025-08-13 ENCOUNTER — OFFICE VISIT (OUTPATIENT)
Dept: URGENT CARE | Facility: URGENT CARE | Age: 45
End: 2025-08-13
Payer: COMMERCIAL

## 2025-08-13 ENCOUNTER — ANCILLARY PROCEDURE (OUTPATIENT)
Dept: CT IMAGING | Facility: CLINIC | Age: 45
End: 2025-08-13
Attending: NURSE PRACTITIONER
Payer: COMMERCIAL

## 2025-08-13 VITALS
SYSTOLIC BLOOD PRESSURE: 104 MMHG | TEMPERATURE: 98.5 F | DIASTOLIC BLOOD PRESSURE: 78 MMHG | HEART RATE: 89 BPM | RESPIRATION RATE: 16 BRPM | OXYGEN SATURATION: 97 %

## 2025-08-13 VITALS
TEMPERATURE: 98.5 F | RESPIRATION RATE: 16 BRPM | HEIGHT: 68 IN | DIASTOLIC BLOOD PRESSURE: 80 MMHG | WEIGHT: 230 LBS | SYSTOLIC BLOOD PRESSURE: 113 MMHG | HEART RATE: 82 BPM | OXYGEN SATURATION: 97 % | BODY MASS INDEX: 34.86 KG/M2

## 2025-08-13 DIAGNOSIS — R17 ELEVATED BILIRUBIN: ICD-10-CM

## 2025-08-13 DIAGNOSIS — R79.89 ELEVATED LFTS: ICD-10-CM

## 2025-08-13 DIAGNOSIS — R10.84 ABDOMINAL PAIN, GENERALIZED: ICD-10-CM

## 2025-08-13 DIAGNOSIS — R74.8 ELEVATED LIVER ENZYMES: ICD-10-CM

## 2025-08-13 DIAGNOSIS — Z90.49 HX OF CHOLECYSTECTOMY: ICD-10-CM

## 2025-08-13 DIAGNOSIS — K59.00 CONSTIPATION, UNSPECIFIED CONSTIPATION TYPE: ICD-10-CM

## 2025-08-13 DIAGNOSIS — R10.13 EPIGASTRIC PAIN: ICD-10-CM

## 2025-08-13 DIAGNOSIS — R10.13 EPIGASTRIC PAIN: Primary | ICD-10-CM

## 2025-08-13 DIAGNOSIS — R94.31 NONSPECIFIC ST-T WAVE ELECTROCARDIOGRAPHIC CHANGES: ICD-10-CM

## 2025-08-13 DIAGNOSIS — R11.14 BILIOUS VOMITING WITH NAUSEA: ICD-10-CM

## 2025-08-13 LAB
ALBUMIN SERPL-MCNC: 4 G/DL (ref 3.4–5)
ALBUMIN UR-MCNC: NEGATIVE MG/DL
ALP SERPL-CCNC: 124 U/L (ref 40–150)
ALT SERPL W P-5'-P-CCNC: 119 U/L (ref 0–50)
AMYLASE SERPL-CCNC: 29 U/L (ref 28–100)
ANION GAP SERPL CALCULATED.3IONS-SCNC: 6 MMOL/L (ref 3–14)
APPEARANCE UR: CLEAR
AST SERPL W P-5'-P-CCNC: 259 U/L (ref 0–45)
BASOPHILS # BLD AUTO: 0.06 10E3/UL (ref 0–0.2)
BASOPHILS NFR BLD AUTO: 0.7 %
BILIRUB SERPL-MCNC: 1.4 MG/DL (ref 0.2–1.3)
BILIRUB UR QL STRIP: NEGATIVE
BUN SERPL-MCNC: 14 MG/DL (ref 7–30)
CALCIUM SERPL-MCNC: 9.3 MG/DL (ref 8.5–10.1)
CHLORIDE BLD-SCNC: 108 MMOL/L (ref 94–109)
CO2 SERPL-SCNC: 28 MMOL/L (ref 20–32)
COLOR UR AUTO: YELLOW
CREAT SERPL-MCNC: 1 MG/DL (ref 0.52–1.04)
CRP SERPL-MCNC: <3 MG/L
EGFRCR SERPLBLD CKD-EPI 2021: 71 ML/MIN/1.73M2
EOSINOPHIL # BLD AUTO: 0.05 10E3/UL (ref 0–0.7)
EOSINOPHIL NFR BLD AUTO: 0.6 %
ERYTHROCYTE [DISTWIDTH] IN BLOOD BY AUTOMATED COUNT: 12.9 % (ref 10–15)
GLUCOSE BLD-MCNC: 106 MG/DL (ref 70–99)
GLUCOSE UR STRIP-MCNC: NEGATIVE MG/DL
HCT VFR BLD AUTO: 37.7 % (ref 35–47)
HGB BLD-MCNC: 12.7 G/DL (ref 11.7–15.7)
HGB UR QL STRIP: NEGATIVE
IMM GRANULOCYTES # BLD: <0.04 10E3/UL
IMM GRANULOCYTES NFR BLD: 0 %
KETONES UR STRIP-MCNC: NEGATIVE MG/DL
LEUKOCYTE ESTERASE UR QL STRIP: NEGATIVE
LIPASE SERPL-CCNC: 32 U/L (ref 13–60)
LYMPHOCYTES # BLD AUTO: 1.35 10E3/UL (ref 0.8–5.3)
LYMPHOCYTES NFR BLD AUTO: 15.6 %
MCH RBC QN AUTO: 29.5 PG (ref 26.5–33)
MCHC RBC AUTO-ENTMCNC: 33.7 G/DL (ref 31.5–36.5)
MCV RBC AUTO: 87.7 FL (ref 78–100)
MONOCYTES # BLD AUTO: 0.46 10E3/UL (ref 0–1.3)
MONOCYTES NFR BLD AUTO: 5.3 %
NEUTROPHILS # BLD AUTO: 6.71 10E3/UL (ref 1.6–8.3)
NEUTROPHILS NFR BLD AUTO: 77.8 %
NITRATE UR QL: NEGATIVE
PH UR STRIP: 7.5 [PH] (ref 5–7)
PLATELET # BLD AUTO: 287 10E3/UL (ref 150–450)
POTASSIUM BLD-SCNC: 3.7 MMOL/L (ref 3.4–5.3)
PROT SERPL-MCNC: 7.2 G/DL (ref 6.8–8.8)
RBC # BLD AUTO: 4.3 10E6/UL (ref 3.8–5.2)
RBC #/AREA URNS AUTO: ABNORMAL /HPF
SODIUM SERPL-SCNC: 142 MMOL/L (ref 135–145)
SP GR UR STRIP: 1.01 (ref 1–1.03)
SQUAMOUS #/AREA URNS AUTO: ABNORMAL /LPF
UROBILINOGEN UR STRIP-MCNC: NORMAL MG/DL
WBC # BLD AUTO: 8.63 10E3/UL (ref 4–11)
WBC #/AREA URNS AUTO: ABNORMAL /HPF

## 2025-08-13 PROCEDURE — 99207 PR FIRST ORDER ACUTE REFERRAL: CPT | Performed by: PHYSICIAN ASSISTANT

## 2025-08-13 PROCEDURE — 74019 RADEX ABDOMEN 2 VIEWS: CPT | Mod: TC | Performed by: RADIOLOGY

## 2025-08-13 PROCEDURE — 82150 ASSAY OF AMYLASE: CPT | Performed by: NURSE PRACTITIONER

## 2025-08-13 PROCEDURE — 85025 COMPLETE CBC W/AUTO DIFF WBC: CPT | Performed by: PHYSICIAN ASSISTANT

## 2025-08-13 PROCEDURE — 81001 URINALYSIS AUTO W/SCOPE: CPT | Performed by: NURSE PRACTITIONER

## 2025-08-13 PROCEDURE — 3079F DIAST BP 80-89 MM HG: CPT | Performed by: PHYSICIAN ASSISTANT

## 2025-08-13 PROCEDURE — 99213 OFFICE O/P EST LOW 20 MIN: CPT | Performed by: NURSE PRACTITIONER

## 2025-08-13 PROCEDURE — 74177 CT ABD & PELVIS W/CONTRAST: CPT | Performed by: RADIOLOGY

## 2025-08-13 PROCEDURE — 83690 ASSAY OF LIPASE: CPT | Performed by: NURSE PRACTITIONER

## 2025-08-13 PROCEDURE — 80053 COMPREHEN METABOLIC PANEL: CPT | Performed by: PHYSICIAN ASSISTANT

## 2025-08-13 PROCEDURE — 93000 ELECTROCARDIOGRAM COMPLETE: CPT | Performed by: PHYSICIAN ASSISTANT

## 2025-08-13 PROCEDURE — 3074F SYST BP LT 130 MM HG: CPT | Performed by: NURSE PRACTITIONER

## 2025-08-13 PROCEDURE — 36415 COLL VENOUS BLD VENIPUNCTURE: CPT | Performed by: PHYSICIAN ASSISTANT

## 2025-08-13 PROCEDURE — 3074F SYST BP LT 130 MM HG: CPT | Performed by: PHYSICIAN ASSISTANT

## 2025-08-13 PROCEDURE — 1125F AMNT PAIN NOTED PAIN PRSNT: CPT | Performed by: PHYSICIAN ASSISTANT

## 2025-08-13 PROCEDURE — 3078F DIAST BP <80 MM HG: CPT | Performed by: NURSE PRACTITIONER

## 2025-08-13 PROCEDURE — 86140 C-REACTIVE PROTEIN: CPT | Performed by: NURSE PRACTITIONER

## 2025-08-13 PROCEDURE — 1125F AMNT PAIN NOTED PAIN PRSNT: CPT | Performed by: NURSE PRACTITIONER

## 2025-08-13 RX ORDER — ONDANSETRON 4 MG/1
4 TABLET, ORALLY DISINTEGRATING ORAL ONCE
Status: COMPLETED | OUTPATIENT
Start: 2025-08-13 | End: 2025-08-13

## 2025-08-13 RX ORDER — IOPAMIDOL 755 MG/ML
126 INJECTION, SOLUTION INTRAVASCULAR ONCE
Status: COMPLETED | OUTPATIENT
Start: 2025-08-13 | End: 2025-08-13

## 2025-08-13 RX ADMIN — ONDANSETRON 4 MG: 4 TABLET, ORALLY DISINTEGRATING ORAL at 10:49

## 2025-08-13 RX ADMIN — IOPAMIDOL 126 ML: 755 INJECTION, SOLUTION INTRAVASCULAR at 14:55

## 2025-08-13 ASSESSMENT — ENCOUNTER SYMPTOMS
PALPITATIONS: 0
NAUSEA: 1
DIARRHEA: 0
APPETITE CHANGE: 0
CONSTIPATION: 1
VOMITING: 1
SHORTNESS OF BREATH: 0
ABDOMINAL PAIN: 1
DYSURIA: 0
FEVER: 0
ABDOMINAL DISTENTION: 1

## 2025-08-13 ASSESSMENT — PAIN SCALES - GENERAL
PAINLEVEL_OUTOF10: MODERATE PAIN (4)
PAINLEVEL_OUTOF10: MODERATE PAIN (4)

## 2025-08-19 ENCOUNTER — TELEPHONE (OUTPATIENT)
Dept: FAMILY MEDICINE | Facility: CLINIC | Age: 45
End: 2025-08-19
Payer: COMMERCIAL

## 2025-08-19 ENCOUNTER — RESULTS FOLLOW-UP (OUTPATIENT)
Dept: INTERNAL MEDICINE | Facility: CLINIC | Age: 45
End: 2025-08-19
Payer: COMMERCIAL

## 2025-08-19 DIAGNOSIS — K85.10 GALLSTONE PANCREATITIS: Primary | ICD-10-CM

## 2025-08-22 ENCOUNTER — PREP FOR PROCEDURE (OUTPATIENT)
Dept: GASTROENTEROLOGY | Facility: CLINIC | Age: 45
End: 2025-08-22

## 2025-08-22 DIAGNOSIS — K85.10 GALLSTONE PANCREATITIS: Primary | ICD-10-CM

## 2025-08-26 ENCOUNTER — CARE COORDINATION (OUTPATIENT)
Dept: GASTROENTEROLOGY | Facility: CLINIC | Age: 45
End: 2025-08-26
Payer: COMMERCIAL

## 2025-09-02 ENCOUNTER — MYC MEDICAL ADVICE (OUTPATIENT)
Dept: FAMILY MEDICINE | Facility: CLINIC | Age: 45
End: 2025-09-02
Payer: COMMERCIAL

## 2025-09-02 DIAGNOSIS — R55 SYNCOPE, UNSPECIFIED SYNCOPE TYPE: Primary | ICD-10-CM

## 2025-09-03 PROBLEM — R55 SYNCOPE: Status: ACTIVE | Noted: 2025-09-03

## 2025-09-04 ENCOUNTER — HOSPITAL ENCOUNTER (OUTPATIENT)
Facility: CLINIC | Age: 45
Discharge: HOME | End: 2025-09-04
Attending: INTERNAL MEDICINE | Admitting: INTERNAL MEDICINE
Payer: COMMERCIAL

## 2025-09-04 ENCOUNTER — ANESTHESIA (OUTPATIENT)
Dept: SURGERY | Facility: CLINIC | Age: 45
End: 2025-09-04
Payer: COMMERCIAL

## 2025-09-04 ENCOUNTER — ANESTHESIA EVENT (OUTPATIENT)
Dept: SURGERY | Facility: CLINIC | Age: 45
End: 2025-09-04
Payer: COMMERCIAL

## 2025-09-04 VITALS
HEIGHT: 68 IN | RESPIRATION RATE: 16 BRPM | WEIGHT: 234.13 LBS | TEMPERATURE: 98.1 F | DIASTOLIC BLOOD PRESSURE: 77 MMHG | SYSTOLIC BLOOD PRESSURE: 113 MMHG | HEART RATE: 80 BPM | OXYGEN SATURATION: 95 % | BODY MASS INDEX: 35.48 KG/M2

## 2025-09-04 LAB
ALBUMIN SERPL BCG-MCNC: 4.2 G/DL (ref 3.5–5.2)
ALP SERPL-CCNC: 87 U/L (ref 40–150)
ALT SERPL W P-5'-P-CCNC: 19 U/L (ref 0–50)
AMYLASE SERPL-CCNC: 37 U/L (ref 28–100)
ANION GAP SERPL CALCULATED.3IONS-SCNC: 13 MMOL/L (ref 7–15)
AST SERPL W P-5'-P-CCNC: 25 U/L (ref 0–45)
BILIRUB SERPL-MCNC: 0.6 MG/DL
BUN SERPL-MCNC: 9.7 MG/DL (ref 6–20)
CALCIUM SERPL-MCNC: 8.8 MG/DL (ref 8.8–10.4)
CHLORIDE SERPL-SCNC: 104 MMOL/L (ref 98–107)
CREAT SERPL-MCNC: 0.98 MG/DL (ref 0.51–0.95)
EGFRCR SERPLBLD CKD-EPI 2021: 73 ML/MIN/1.73M2
ERYTHROCYTE [DISTWIDTH] IN BLOOD BY AUTOMATED COUNT: 12.9 % (ref 10–15)
GLUCOSE SERPL-MCNC: 105 MG/DL (ref 70–99)
HCO3 SERPL-SCNC: 22 MMOL/L (ref 22–29)
HCT VFR BLD AUTO: 38 % (ref 35–47)
HGB BLD-MCNC: 12.6 G/DL (ref 11.7–15.7)
INR PPP: 0.94 (ref 0.85–1.15)
LIPASE SERPL-CCNC: 25 U/L (ref 13–60)
LIPASE SERPL-CCNC: 69 U/L (ref 13–60)
MCH RBC QN AUTO: 29.2 PG (ref 26.5–33)
MCHC RBC AUTO-ENTMCNC: 33.2 G/DL (ref 31.5–36.5)
MCV RBC AUTO: 88.2 FL (ref 78–100)
PLATELET # BLD AUTO: 316 10E3/UL (ref 150–450)
POTASSIUM SERPL-SCNC: 4 MMOL/L (ref 3.4–5.3)
PROT SERPL-MCNC: 7.4 G/DL (ref 6.4–8.3)
PROTHROMBIN TIME: 12.8 SECONDS (ref 11.8–14.8)
RBC # BLD AUTO: 4.31 10E6/UL (ref 3.8–5.2)
SODIUM SERPL-SCNC: 139 MMOL/L (ref 135–145)
WBC # BLD AUTO: 7.96 10E3/UL (ref 4–11)

## 2025-09-04 PROCEDURE — 250N000009 HC RX 250: Performed by: INTERNAL MEDICINE

## 2025-09-04 PROCEDURE — 258N000003 HC RX IP 258 OP 636

## 2025-09-04 PROCEDURE — 250N000011 HC RX IP 250 OP 636

## 2025-09-04 PROCEDURE — 85610 PROTHROMBIN TIME: CPT

## 2025-09-04 PROCEDURE — 258N000003 HC RX IP 258 OP 636: Performed by: NURSE ANESTHETIST, CERTIFIED REGISTERED

## 2025-09-04 PROCEDURE — 250N000011 HC RX IP 250 OP 636: Performed by: INTERNAL MEDICINE

## 2025-09-04 PROCEDURE — 82150 ASSAY OF AMYLASE: CPT

## 2025-09-04 PROCEDURE — 83690 ASSAY OF LIPASE: CPT

## 2025-09-04 PROCEDURE — 250N000009 HC RX 250: Performed by: REGISTERED NURSE

## 2025-09-04 PROCEDURE — 250N000011 HC RX IP 250 OP 636: Performed by: REGISTERED NURSE

## 2025-09-04 PROCEDURE — 36415 COLL VENOUS BLD VENIPUNCTURE: CPT

## 2025-09-04 PROCEDURE — 255N000002 HC RX 255 OP 636: Performed by: INTERNAL MEDICINE

## 2025-09-04 PROCEDURE — 85027 COMPLETE CBC AUTOMATED: CPT

## 2025-09-04 PROCEDURE — 250N000011 HC RX IP 250 OP 636: Performed by: NURSE ANESTHETIST, CERTIFIED REGISTERED

## 2025-09-04 PROCEDURE — 250N000009 HC RX 250: Performed by: NURSE ANESTHETIST, CERTIFIED REGISTERED

## 2025-09-04 PROCEDURE — 80053 COMPREHEN METABOLIC PANEL: CPT

## 2025-09-04 RX ORDER — FENTANYL CITRATE 50 UG/ML
50 INJECTION, SOLUTION INTRAMUSCULAR; INTRAVENOUS EVERY 5 MIN PRN
Status: DISCONTINUED | OUTPATIENT
Start: 2025-09-04 | End: 2025-09-04 | Stop reason: HOSPADM

## 2025-09-04 RX ORDER — ONDANSETRON 4 MG/1
4 TABLET, ORALLY DISINTEGRATING ORAL EVERY 6 HOURS PRN
Status: DISCONTINUED | OUTPATIENT
Start: 2025-09-04 | End: 2025-09-05 | Stop reason: HOSPADM

## 2025-09-04 RX ORDER — PROCHLORPERAZINE MALEATE 5 MG/1
10 TABLET ORAL EVERY 6 HOURS PRN
Status: DISCONTINUED | OUTPATIENT
Start: 2025-09-04 | End: 2025-09-05 | Stop reason: HOSPADM

## 2025-09-04 RX ORDER — HYDROMORPHONE HYDROCHLORIDE 1 MG/ML
0.4 INJECTION, SOLUTION INTRAMUSCULAR; INTRAVENOUS; SUBCUTANEOUS EVERY 5 MIN PRN
Status: DISCONTINUED | OUTPATIENT
Start: 2025-09-04 | End: 2025-09-04

## 2025-09-04 RX ORDER — LIDOCAINE 40 MG/G
CREAM TOPICAL
Status: DISCONTINUED | OUTPATIENT
Start: 2025-09-04 | End: 2025-09-04 | Stop reason: HOSPADM

## 2025-09-04 RX ORDER — ONDANSETRON 2 MG/ML
4 INJECTION INTRAMUSCULAR; INTRAVENOUS EVERY 30 MIN PRN
Status: ACTIVE | OUTPATIENT
Start: 2025-09-04

## 2025-09-04 RX ORDER — INDOMETHACIN 50 MG/1
SUPPOSITORY RECTAL PRN
Status: DISCONTINUED | OUTPATIENT
Start: 2025-09-04 | End: 2025-09-04 | Stop reason: HOSPADM

## 2025-09-04 RX ORDER — AMPICILLIN AND SULBACTAM 1; .5 G/1; G/1
INJECTION, POWDER, FOR SOLUTION INTRAMUSCULAR; INTRAVENOUS PRN
Status: DISCONTINUED | OUTPATIENT
Start: 2025-09-04 | End: 2025-09-04

## 2025-09-04 RX ORDER — FLUMAZENIL 0.1 MG/ML
0.2 INJECTION, SOLUTION INTRAVENOUS
Status: DISCONTINUED | OUTPATIENT
Start: 2025-09-04 | End: 2025-09-05 | Stop reason: HOSPADM

## 2025-09-04 RX ORDER — ONDANSETRON 4 MG/1
4 TABLET, ORALLY DISINTEGRATING ORAL EVERY 30 MIN PRN
Status: DISCONTINUED | OUTPATIENT
Start: 2025-09-04 | End: 2025-09-04 | Stop reason: HOSPADM

## 2025-09-04 RX ORDER — LIDOCAINE HYDROCHLORIDE 20 MG/ML
INJECTION, SOLUTION INFILTRATION; PERINEURAL PRN
Status: DISCONTINUED | OUTPATIENT
Start: 2025-09-04 | End: 2025-09-04

## 2025-09-04 RX ORDER — PROPOFOL 10 MG/ML
INJECTION, EMULSION INTRAVENOUS PRN
Status: DISCONTINUED | OUTPATIENT
Start: 2025-09-04 | End: 2025-09-04

## 2025-09-04 RX ORDER — DEXAMETHASONE SODIUM PHOSPHATE 4 MG/ML
4 INJECTION, SOLUTION INTRA-ARTICULAR; INTRALESIONAL; INTRAMUSCULAR; INTRAVENOUS; SOFT TISSUE
Status: ACTIVE | OUTPATIENT
Start: 2025-09-04

## 2025-09-04 RX ORDER — FENTANYL CITRATE 50 UG/ML
25 INJECTION, SOLUTION INTRAMUSCULAR; INTRAVENOUS EVERY 5 MIN PRN
Status: DISCONTINUED | OUTPATIENT
Start: 2025-09-04 | End: 2025-09-04 | Stop reason: HOSPADM

## 2025-09-04 RX ORDER — ONDANSETRON 2 MG/ML
INJECTION INTRAMUSCULAR; INTRAVENOUS PRN
Status: DISCONTINUED | OUTPATIENT
Start: 2025-09-04 | End: 2025-09-04

## 2025-09-04 RX ORDER — DEXAMETHASONE SODIUM PHOSPHATE 4 MG/ML
4 INJECTION, SOLUTION INTRA-ARTICULAR; INTRALESIONAL; INTRAMUSCULAR; INTRAVENOUS; SOFT TISSUE
Status: DISCONTINUED | OUTPATIENT
Start: 2025-09-04 | End: 2025-09-04 | Stop reason: HOSPADM

## 2025-09-04 RX ORDER — HYDROMORPHONE HCL IN WATER/PF 6 MG/30 ML
0.2 PATIENT CONTROLLED ANALGESIA SYRINGE INTRAVENOUS EVERY 5 MIN PRN
Refills: 0 | Status: DISCONTINUED | OUTPATIENT
Start: 2025-09-04 | End: 2025-09-04 | Stop reason: HOSPADM

## 2025-09-04 RX ORDER — DEXAMETHASONE SODIUM PHOSPHATE 4 MG/ML
INJECTION, SOLUTION INTRA-ARTICULAR; INTRALESIONAL; INTRAMUSCULAR; INTRAVENOUS; SOFT TISSUE PRN
Status: DISCONTINUED | OUTPATIENT
Start: 2025-09-04 | End: 2025-09-04

## 2025-09-04 RX ORDER — OXYCODONE HYDROCHLORIDE 10 MG/1
10 TABLET ORAL
Status: ACTIVE | OUTPATIENT
Start: 2025-09-04

## 2025-09-04 RX ORDER — ONDANSETRON 2 MG/ML
4 INJECTION INTRAMUSCULAR; INTRAVENOUS EVERY 30 MIN PRN
Status: DISCONTINUED | OUTPATIENT
Start: 2025-09-04 | End: 2025-09-04 | Stop reason: HOSPADM

## 2025-09-04 RX ORDER — SODIUM CHLORIDE, SODIUM LACTATE, POTASSIUM CHLORIDE, CALCIUM CHLORIDE 600; 310; 30; 20 MG/100ML; MG/100ML; MG/100ML; MG/100ML
INJECTION, SOLUTION INTRAVENOUS CONTINUOUS
Status: DISCONTINUED | OUTPATIENT
Start: 2025-09-04 | End: 2025-09-04 | Stop reason: HOSPADM

## 2025-09-04 RX ORDER — HYDROMORPHONE HCL IN WATER/PF 6 MG/30 ML
0.4 PATIENT CONTROLLED ANALGESIA SYRINGE INTRAVENOUS EVERY 5 MIN PRN
Refills: 0 | Status: DISCONTINUED | OUTPATIENT
Start: 2025-09-04 | End: 2025-09-04 | Stop reason: HOSPADM

## 2025-09-04 RX ORDER — ONDANSETRON 2 MG/ML
4 INJECTION INTRAMUSCULAR; INTRAVENOUS EVERY 6 HOURS PRN
Status: DISCONTINUED | OUTPATIENT
Start: 2025-09-04 | End: 2025-09-05 | Stop reason: HOSPADM

## 2025-09-04 RX ORDER — ONDANSETRON 4 MG/1
4 TABLET, ORALLY DISINTEGRATING ORAL EVERY 30 MIN PRN
Status: ACTIVE | OUTPATIENT
Start: 2025-09-04

## 2025-09-04 RX ORDER — SODIUM CHLORIDE, SODIUM LACTATE, POTASSIUM CHLORIDE, CALCIUM CHLORIDE 600; 310; 30; 20 MG/100ML; MG/100ML; MG/100ML; MG/100ML
INJECTION, SOLUTION INTRAVENOUS CONTINUOUS PRN
Status: DISCONTINUED | OUTPATIENT
Start: 2025-09-04 | End: 2025-09-04

## 2025-09-04 RX ORDER — FENTANYL CITRATE 50 UG/ML
INJECTION, SOLUTION INTRAMUSCULAR; INTRAVENOUS PRN
Status: DISCONTINUED | OUTPATIENT
Start: 2025-09-04 | End: 2025-09-04

## 2025-09-04 RX ORDER — NALOXONE HYDROCHLORIDE 0.4 MG/ML
0.1 INJECTION, SOLUTION INTRAMUSCULAR; INTRAVENOUS; SUBCUTANEOUS
Status: ACTIVE | OUTPATIENT
Start: 2025-09-04

## 2025-09-04 RX ORDER — NALOXONE HYDROCHLORIDE 0.4 MG/ML
0.1 INJECTION, SOLUTION INTRAMUSCULAR; INTRAVENOUS; SUBCUTANEOUS
Status: DISCONTINUED | OUTPATIENT
Start: 2025-09-04 | End: 2025-09-04 | Stop reason: HOSPADM

## 2025-09-04 RX ORDER — HYDROMORPHONE HYDROCHLORIDE 1 MG/ML
0.2 INJECTION, SOLUTION INTRAMUSCULAR; INTRAVENOUS; SUBCUTANEOUS EVERY 5 MIN PRN
Status: DISCONTINUED | OUTPATIENT
Start: 2025-09-04 | End: 2025-09-04

## 2025-09-04 RX ORDER — OXYCODONE HYDROCHLORIDE 5 MG/1
5 TABLET ORAL
Status: ACTIVE | OUTPATIENT
Start: 2025-09-04

## 2025-09-04 RX ADMIN — DEXMEDETOMIDINE HYDROCHLORIDE 8 MCG: 100 INJECTION, SOLUTION INTRAVENOUS at 17:13

## 2025-09-04 RX ADMIN — FENTANYL CITRATE 100 MCG: 50 INJECTION INTRAMUSCULAR; INTRAVENOUS at 16:41

## 2025-09-04 RX ADMIN — DEXAMETHASONE SODIUM PHOSPHATE 4 MG: 4 INJECTION, SOLUTION INTRAMUSCULAR; INTRAVENOUS at 16:40

## 2025-09-04 RX ADMIN — Medication 20 MG: at 17:58

## 2025-09-04 RX ADMIN — Medication 100 MG: at 18:43

## 2025-09-04 RX ADMIN — SODIUM CHLORIDE, SODIUM LACTATE, POTASSIUM CHLORIDE, AND CALCIUM CHLORIDE: .6; .31; .03; .02 INJECTION, SOLUTION INTRAVENOUS at 16:28

## 2025-09-04 RX ADMIN — PHENYLEPHRINE HYDROCHLORIDE 100 MCG: 10 INJECTION INTRAVENOUS at 17:28

## 2025-09-04 RX ADMIN — Medication 10 MG: at 18:04

## 2025-09-04 RX ADMIN — FENTANYL CITRATE 25 MCG: 50 INJECTION, SOLUTION INTRAMUSCULAR; INTRAVENOUS at 19:14

## 2025-09-04 RX ADMIN — Medication 10 MG: at 18:31

## 2025-09-04 RX ADMIN — AMPICILLIN SODIUM AND SULBACTAM SODIUM 3 G: 1; .5 INJECTION, POWDER, FOR SOLUTION INTRAMUSCULAR; INTRAVENOUS at 16:36

## 2025-09-04 RX ADMIN — GLUCAGON 0.4 MG: 1 INJECTION, POWDER, LYOPHILIZED, FOR SOLUTION INTRAMUSCULAR; INTRAVENOUS at 18:01

## 2025-09-04 RX ADMIN — LIDOCAINE HYDROCHLORIDE 100 MG: 20 INJECTION, SOLUTION INFILTRATION; PERINEURAL at 16:40

## 2025-09-04 RX ADMIN — MIDAZOLAM 2 MG: 1 INJECTION INTRAMUSCULAR; INTRAVENOUS at 16:28

## 2025-09-04 RX ADMIN — PROPOFOL 175 MG: 10 INJECTION, EMULSION INTRAVENOUS at 16:41

## 2025-09-04 RX ADMIN — FENTANYL CITRATE 25 MCG: 50 INJECTION, SOLUTION INTRAMUSCULAR; INTRAVENOUS at 19:27

## 2025-09-04 RX ADMIN — HYDROMORPHONE HYDROCHLORIDE 0.2 MG: 0.2 INJECTION, SOLUTION INTRAMUSCULAR; INTRAVENOUS; SUBCUTANEOUS at 19:35

## 2025-09-04 RX ADMIN — DEXMEDETOMIDINE HYDROCHLORIDE 16 MCG: 100 INJECTION, SOLUTION INTRAVENOUS at 16:54

## 2025-09-04 RX ADMIN — AMPICILLIN SODIUM AND SULBACTAM SODIUM 1.5 G: 1; .5 INJECTION, POWDER, FOR SOLUTION INTRAMUSCULAR; INTRAVENOUS at 18:34

## 2025-09-04 RX ADMIN — PHENYLEPHRINE HYDROCHLORIDE 200 MCG: 10 INJECTION INTRAVENOUS at 16:58

## 2025-09-04 RX ADMIN — PROPOFOL 25 MG: 10 INJECTION, EMULSION INTRAVENOUS at 16:46

## 2025-09-04 RX ADMIN — GLUCAGON 0.4 MG: 1 INJECTION, POWDER, LYOPHILIZED, FOR SOLUTION INTRAMUSCULAR; INTRAVENOUS at 18:22

## 2025-09-04 RX ADMIN — PHENYLEPHRINE HYDROCHLORIDE 100 MCG: 10 INJECTION INTRAVENOUS at 17:19

## 2025-09-04 RX ADMIN — SODIUM CHLORIDE, SODIUM LACTATE, POTASSIUM CHLORIDE, AND CALCIUM CHLORIDE 1000 ML: .6; .31; .03; .02 INJECTION, SOLUTION INTRAVENOUS at 14:13

## 2025-09-04 RX ADMIN — Medication 50 MG: at 16:43

## 2025-09-04 RX ADMIN — ONDANSETRON 4 MG: 2 INJECTION INTRAMUSCULAR; INTRAVENOUS at 17:08

## 2025-09-04 RX ADMIN — Medication 200 MG: at 18:37

## 2025-09-04 ASSESSMENT — ACTIVITIES OF DAILY LIVING (ADL)
ADLS_ACUITY_SCORE: 38

## 2025-09-04 ASSESSMENT — ENCOUNTER SYMPTOMS: DYSRHYTHMIAS: 0

## (undated) RX ORDER — HYDROMORPHONE HCL IN WATER/PF 6 MG/30 ML
PATIENT CONTROLLED ANALGESIA SYRINGE INTRAVENOUS
Status: DISPENSED
Start: 2025-09-04

## (undated) RX ORDER — FENTANYL CITRATE 50 UG/ML
INJECTION, SOLUTION INTRAMUSCULAR; INTRAVENOUS
Status: DISPENSED
Start: 2025-09-04